# Patient Record
Sex: FEMALE | Race: WHITE | Employment: UNEMPLOYED | URBAN - METROPOLITAN AREA
[De-identification: names, ages, dates, MRNs, and addresses within clinical notes are randomized per-mention and may not be internally consistent; named-entity substitution may affect disease eponyms.]

---

## 2018-02-21 PROBLEM — B00.1 RECURRENT COLD SORES: Status: ACTIVE | Noted: 2018-02-21

## 2019-05-23 PROBLEM — D12.5 ADENOMATOUS POLYP OF SIGMOID COLON: Status: ACTIVE | Noted: 2019-05-23

## 2024-09-18 ENCOUNTER — TELEPHONE (OUTPATIENT)
Age: 59
End: 2024-09-18

## 2024-09-30 ENCOUNTER — OFFICE VISIT (OUTPATIENT)
Dept: SURGERY | Age: 59
End: 2024-09-30
Payer: COMMERCIAL

## 2024-09-30 VITALS
RESPIRATION RATE: 12 BRPM | DIASTOLIC BLOOD PRESSURE: 62 MMHG | HEIGHT: 66 IN | TEMPERATURE: 97.8 F | WEIGHT: 139.8 LBS | HEART RATE: 72 BPM | BODY MASS INDEX: 22.47 KG/M2 | OXYGEN SATURATION: 97 % | SYSTOLIC BLOOD PRESSURE: 112 MMHG

## 2024-09-30 DIAGNOSIS — R92.8 ABNORMAL MAMMOGRAM OF LEFT BREAST: Primary | ICD-10-CM

## 2024-09-30 PROBLEM — R39.11 URINARY HESITANCY: Status: ACTIVE | Noted: 2023-10-24

## 2024-09-30 PROBLEM — F32.5 MAJOR DEPRESSION IN REMISSION (HCC): Status: ACTIVE | Noted: 2023-10-24

## 2024-09-30 PROBLEM — N39.3 FEMALE STRESS INCONTINENCE: Status: ACTIVE | Noted: 2023-10-24

## 2024-09-30 PROBLEM — E78.5 HYPERLIPIDEMIA, MILD: Status: ACTIVE | Noted: 2023-10-24

## 2024-09-30 PROBLEM — H93.13 TINNITUS OF BOTH EARS: Status: ACTIVE | Noted: 2023-10-24

## 2024-09-30 PROBLEM — H91.93 BILATERAL CHANGE IN HEARING: Status: ACTIVE | Noted: 2023-10-24

## 2024-09-30 PROCEDURE — 99203 OFFICE O/P NEW LOW 30 MIN: CPT | Performed by: SURGERY

## 2024-09-30 RX ORDER — FAMOTIDINE 20 MG/1
20 TABLET, FILM COATED ORAL DAILY
COMMUNITY

## 2024-09-30 ASSESSMENT — ENCOUNTER SYMPTOMS
CHEST TIGHTNESS: 0
ABDOMINAL PAIN: 0
NAUSEA: 0
COUGH: 0
VOMITING: 0
COLOR CHANGE: 0
SORE THROAT: 0
SHORTNESS OF BREATH: 0

## 2024-09-30 NOTE — PROGRESS NOTES
questionnaire     Fear of Current or Ex-Partner: Patient declined     Emotionally Abused: Patient declined     Physically Abused: Patient declined     Sexually Abused: Patient declined   Housing Stability: Low Risk  (11/12/2021)    Housing Stability Vital Sign     Unable to Pay for Housing in the Last Year: No     Number of Places Lived in the Last Year: 1     Unstable Housing in the Last Year: No     Under care of PCP for all positive symptoms  Review of Systems   Constitutional:  Negative for activity change, appetite change, chills, diaphoresis, fatigue, fever and unexpected weight change.   HENT:  Negative for congestion, ear pain, hearing loss, mouth sores, nosebleeds and sore throat.    Respiratory:  Negative for cough, chest tightness and shortness of breath.    Cardiovascular:  Negative for chest pain, palpitations and leg swelling.   Gastrointestinal:  Negative for abdominal pain, nausea and vomiting.   Endocrine: Negative for cold intolerance, heat intolerance, polydipsia, polyphagia and polyuria.   Genitourinary:  Negative for difficulty urinating, menstrual problem and vaginal bleeding.   Musculoskeletal:  Positive for neck pain. Negative for neck stiffness.   Skin:  Negative for color change, pallor, rash and wound.   Allergic/Immunologic: Negative for environmental allergies and immunocompromised state.   Neurological:  Negative for weakness.   Hematological:  Does not bruise/bleed easily.   Psychiatric/Behavioral:  Negative for agitation, confusion, sleep disturbance and suicidal ideas. The patient is not nervous/anxious.        Have you ever tested positive for AIDS? no  Have you ever tested positive for Hepatitis? no    ANTICOAGULANT MEDICATIONS:  none    SOCIAL HISTORY   Marital status:   Occupation:  retired  Tobacco use: Mendy  reports that she has never smoked. She has never used smokeless tobacco.  Alcohol use: Mendy  reports current alcohol use.  Drug use: Mendy  reports no history of drug

## 2024-09-30 NOTE — PATIENT INSTRUCTIONS
test results aren't clear, you may have another biopsy or test.  How can you care for yourself at home?  You can go back to most of your usual activities right away. But for the first 24 hours, avoid activities that put pressure on your chest or require you to move your upper body a lot.  The site may be tender for 2 or 3 days. You may also have some bruising, swelling, or slight bleeding.  You may find it more comfortable to wear a supportive bra or chest binder for the first few days.  It may be more comfortable to sleep on your back or on the side that wasn't biopsied.  You can use an ice pack. Put ice or a cold pack on the area for 10 to 20 minutes at a time. Put a thin cloth between the ice and your skin.  Ask your doctor if you can take an over-the-counter pain medicine, such as acetaminophen, ibuprofen, or naproxen. Be safe with medicines. Read and follow all instructions on the label.  Follow-up care is a key part of your treatment and safety. Be sure to make and go to all appointments, and call your doctor if you are having problems. It's also a good idea to keep a list of the medicines you take. Ask your doctor when you can expect to have your test results.  Where can you learn more?  Go to https://www.[employer].net/patientEd and enter Y286 to learn more about \"Core Needle Breast Biopsy: About This Test.\"  Current as of: October 25, 2023  Content Version: 14.1  © 9996-3128 [employer].   Care instructions adapted under license by [employer]. If you have questions about a medical condition or this instruction, always ask your healthcare professional. [employer] disclaims any warranty or liability for your use of this information.

## 2024-10-04 ENCOUNTER — TELEPHONE (OUTPATIENT)
Dept: SURGERY | Age: 59
End: 2024-10-04

## 2024-10-04 NOTE — TELEPHONE ENCOUNTER
----- Message from Dr. Melody Norton MD sent at 10/4/2024  8:14 AM EDT -----  Call her - reeviewed imaging and agreed with bx recs    Also I want to see her 10 days after her biopsy - can u make her an appt  
I informed the patient that Dr. Norton reviewed her breast imaging and is recommending she keep the left breast biopsy as scheduled on 10/9/2024.The patient is scheduled for a post biopsy appointment on 10/16/2024. The patient verbalized understanding to have the biopsy completed as scheduled and to follow up in the office.The patient was provided with the appointment date/time/location and has no questions at this time.The patient is aware to call the office with any questions or concerns.  
Alert and oriented to person, place and time

## 2024-10-09 ENCOUNTER — HOSPITAL ENCOUNTER (OUTPATIENT)
Dept: WOMENS IMAGING | Age: 59
Discharge: HOME | End: 2024-10-11
Attending: SURGERY
Payer: COMMERCIAL

## 2024-10-09 VITALS — RESPIRATION RATE: 20 BRPM | DIASTOLIC BLOOD PRESSURE: 77 MMHG | HEART RATE: 67 BPM | SYSTOLIC BLOOD PRESSURE: 124 MMHG

## 2024-10-09 DIAGNOSIS — R92.8 ABNORMAL MAMMOGRAM OF LEFT BREAST: ICD-10-CM

## 2024-10-09 PROCEDURE — A4217 STERILE WATER/SALINE, 500 ML: HCPCS | Performed by: RADIOLOGY

## 2024-10-09 PROCEDURE — 77065 DX MAMMO INCL CAD UNI: CPT

## 2024-10-09 PROCEDURE — 2500000003 HC RX 250 WO HCPCS: Performed by: RADIOLOGY

## 2024-10-09 PROCEDURE — 76098 X-RAY EXAM SURGICAL SPECIMEN: CPT

## 2024-10-09 PROCEDURE — A4648 IMPLANTABLE TISSUE MARKER: HCPCS

## 2024-10-09 PROCEDURE — 2580000003 HC RX 258: Performed by: RADIOLOGY

## 2024-10-09 RX ORDER — LIDOCAINE HYDROCHLORIDE AND EPINEPHRINE 10; 10 MG/ML; UG/ML
20 INJECTION, SOLUTION INFILTRATION; PERINEURAL ONCE
Status: COMPLETED | OUTPATIENT
Start: 2024-10-09 | End: 2024-10-09

## 2024-10-09 RX ORDER — SODIUM CHLORIDE 9 MG/ML
250 INJECTION, SOLUTION INTRAVENOUS CONTINUOUS
Status: DISCONTINUED | OUTPATIENT
Start: 2024-10-09 | End: 2024-10-12 | Stop reason: HOSPADM

## 2024-10-09 RX ORDER — MAGNESIUM HYDROXIDE 1200 MG/15ML
LIQUID ORAL CONTINUOUS PRN
Status: COMPLETED | OUTPATIENT
Start: 2024-10-09 | End: 2024-10-09

## 2024-10-09 RX ORDER — LIDOCAINE HYDROCHLORIDE 20 MG/ML
20 INJECTION, SOLUTION INFILTRATION; PERINEURAL ONCE
Status: COMPLETED | OUTPATIENT
Start: 2024-10-09 | End: 2024-10-09

## 2024-10-09 RX ADMIN — SODIUM CHLORIDE 250 ML: 900 IRRIGANT IRRIGATION at 14:20

## 2024-10-09 RX ADMIN — LIDOCAINE HYDROCHLORIDE,EPINEPHRINE BITARTRATE 4 ML: 10; .01 INJECTION, SOLUTION INFILTRATION; PERINEURAL at 14:26

## 2024-10-09 RX ADMIN — LIDOCAINE HYDROCHLORIDE 4 ML: 20 INJECTION, SOLUTION INFILTRATION; PERINEURAL at 14:25

## 2024-10-09 ASSESSMENT — PAIN SCALES - GENERAL
PAINLEVEL_OUTOF10: 0
PAINLEVEL_OUTOF10: 0

## 2024-10-09 NOTE — DISCHARGE INSTRUCTIONS
Post Procedure Instructions for Breast Biopsies    You have had a breast biopsy of the Left Breast at [facility], which was ordered by Dr. RACHEL Norton    Activity  You may return to work or other activities the day of your biopsy, providing these activites do not require any heavy lifting or strenuous activity. We do recommend that you take the rest of the day following your biopsy just to rest.    Diet  You may resume your normal diet    Medications  1. Continue taking your normal medications.  2. You may take a mild pain reliever, as needed, such as Tylenol (Acetaminophen), Advil (Ibuprofen) or Motrin    Dressing  1. Wear your bra day and night for the remainder of today and tomorrow  2. Keep the ice pack on for 15 minutes at least 2 times today  3. You may shower and pat the dressing dry tomorrow.   On Friday you may remove the dressing. The biopsy site should have started to heal at this point. At your discretion you can put a band-aid on it.    Other Instructions  1. You may have some bruising following the biopsy, that is normal because of the compression and it will heal and go away  2. For severyal days or even a couple of weeks, you may feel mild tenderness, \"twinges\", or even a small bump at the biopsy site. This is normal. Applying moist heat to the area may bring relief. This will disappear with time    If you develop any of the following symptoms, please contact your referring physician.  1. Active bleeding-hold compression to the site. If it does not stop call the radiology department  2. If you develop redness or heat at the biopsy site or a fever 5-7 days following your biopsy this could be a sign of an early infection    Physician performing exam: Dr. RACHEL Rayo    Follow up appointment with Dr. Melody Norton on 10/16/24 at 1345 in [place].  [phone]    Your [facility] Team  Pippa Christine RN, [facility]   Phone: [phone]   Cell

## 2024-10-14 ENCOUNTER — TELEPHONE (OUTPATIENT)
Dept: SURGERY | Age: 59
End: 2024-10-14

## 2024-10-14 DIAGNOSIS — D05.12 BREAST NEOPLASM, TIS (DCIS), LEFT: Primary | ICD-10-CM

## 2024-10-14 NOTE — TELEPHONE ENCOUNTER
Patient called for results of biopsy. Informed patient the report was completed, but Dr. Norton had not commented yet. Reminded her of her appointment on 10/16/24 to review the results. Patient is asking to have an idea of what will be discussed on Wednesday. Her  is leaving for out of town today and would like to be with patient if patient is going to receiving unfavorable news.

## 2024-10-14 NOTE — TELEPHONE ENCOUNTER
PATIENT:  Mendy Gonzalez    DATE:     10/14/2024    TELEPHONE CONVERSATION:    Mendy MATHEW Carlos was called regarding biopsy results.    Dictated by:  Melody Norton MD  10/14/2024

## 2024-10-16 ENCOUNTER — PREP FOR PROCEDURE (OUTPATIENT)
Dept: SURGERY | Age: 59
End: 2024-10-16

## 2024-10-16 ENCOUNTER — OFFICE VISIT (OUTPATIENT)
Dept: SURGERY | Age: 59
End: 2024-10-16
Payer: COMMERCIAL

## 2024-10-16 VITALS
BODY MASS INDEX: 22.82 KG/M2 | DIASTOLIC BLOOD PRESSURE: 80 MMHG | SYSTOLIC BLOOD PRESSURE: 110 MMHG | HEART RATE: 84 BPM | HEIGHT: 66 IN | RESPIRATION RATE: 12 BRPM | WEIGHT: 142 LBS | TEMPERATURE: 97.4 F | OXYGEN SATURATION: 96 %

## 2024-10-16 DIAGNOSIS — N64.9 BREAST DISORDER: ICD-10-CM

## 2024-10-16 DIAGNOSIS — D05.12 BREAST NEOPLASM, TIS (DCIS), LEFT: Primary | ICD-10-CM

## 2024-10-16 DIAGNOSIS — D05.12 INTRADUCTAL CARCINOMA IN SITU OF LEFT BREAST: ICD-10-CM

## 2024-10-16 PROCEDURE — 99215 OFFICE O/P EST HI 40 MIN: CPT | Performed by: SURGERY

## 2024-10-16 NOTE — PROGRESS NOTES
CANCER TALK    PATIENT:  Mendy Gonzalez    DATE:     10/16/24    SURGICAL DISCUSSION:    Vitals:    10/16/24 1337   BP: 110/80   Pulse: 84   Resp: 12   Temp: 97.4 °F (36.3 °C)   SpO2: 96%   Weight: 64.4 kg (142 lb)   Height: 1.68 m (5' 6.14\")        Cancer Staging   Breast neoplasm, Tis (DCIS), left  Staging form: Breast, AJCC 8th Edition  - Clinical: Stage 0 (cTis (DCIS), cN0, cM0, G3, ER+, AL+) - Signed by Melody Norton MD on 10/14/2024       Mendy is a 59 y.o. year old  female who presents for a discussion LEFT DCIS.    We underwent a description of the pathology of her tumor, the clinical stage, her treatment options of breast conservation versus simple mastectomy and sentinel lymph node biopsy.  These were all discussed with her.   The patient is aware that by having a sentinel lymph node if the sentinel lymph node is negative on frozen section and the final pathology is positive she will need to return to the Operating Room.  She is also aware there is a 5% chance that the sentinel lymph node may not be identified at surgery and that she may need to have a completion axillary dissection. There is a 3-5% chance of a false/negative finding on sentinel lymph node biopsy.  The indications for chemotherapy and radiation therapy were also discussed. The patient has undergone explanation of the complications of the procedures which are including but not limited to bleeding, infection, nerve injury and lymphedema.   The variation in lymphedema rates between sentinel lymph node biopsy and the completion axillary dissection were discussed.  The patient is aware that if she meets all the criteria of having clear margins that the survival and local recurrence rate for mastectomy and breast conservation are the same.  The potential risk of local recurrence is 5-6%.  She is aware that if her margins return positive on the lumpectomy specimen that she would need to have either a re-excision for margins or a completion

## 2024-10-17 ENCOUNTER — HOSPITAL ENCOUNTER (OUTPATIENT)
Dept: MRI IMAGING | Age: 59
Discharge: HOME | End: 2024-10-19
Attending: SURGERY
Payer: COMMERCIAL

## 2024-10-17 ENCOUNTER — TELEPHONE (OUTPATIENT)
Age: 59
End: 2024-10-17

## 2024-10-17 DIAGNOSIS — N64.9 BREAST DISORDER: ICD-10-CM

## 2024-10-17 DIAGNOSIS — D05.12 BREAST NEOPLASM, TIS (DCIS), LEFT: ICD-10-CM

## 2024-10-17 PROCEDURE — 6360000004 HC RX CONTRAST MEDICATION: Performed by: SURGERY

## 2024-10-17 PROCEDURE — A9577 INJ MULTIHANCE: HCPCS | Performed by: SURGERY

## 2024-10-17 PROCEDURE — C8908 MRI W/O FOL W/CONT, BREAST,: HCPCS

## 2024-10-17 RX ADMIN — GADOBENATE DIMEGLUMINE 20 ML: 529 INJECTION, SOLUTION INTRAVENOUS at 18:52

## 2024-10-17 NOTE — TELEPHONE ENCOUNTER
[facility] called to give approved prior authorization for a patients MRI   MRI both breasts   Approval dates from 10/17- 11/15  Auth # [id number]

## 2024-10-22 ENCOUNTER — TELEPHONE (OUTPATIENT)
Dept: SURGERY | Age: 59
End: 2024-10-22

## 2024-10-22 DIAGNOSIS — D05.12 INTRADUCTAL CARCINOMA IN SITU OF LEFT BREAST: ICD-10-CM

## 2024-10-22 DIAGNOSIS — R92.8 ABNORMAL MAGNETIC RESONANCE IMAGING OF BREAST, BILATERAL: Primary | ICD-10-CM

## 2024-10-22 NOTE — TELEPHONE ENCOUNTER
I informed the patient that based on her breast MRI results, there is a recommendation of second look imaging of bilateral breasts. I explained that the breast navigator would be reaching out to her to coordinate the diagnostic imaging. I explained that her appointments today for pre-operative teaching,  ant OT evaluation would be cancelled and rescheduled after further workup is completed. The patient verbalized understanding and has no questions at this time. The patient is aware to call the office with any questions/concerns.

## 2024-10-23 LAB
Lab: NEGATIVE
Lab: NORMAL

## 2024-10-24 ENCOUNTER — PATIENT MESSAGE (OUTPATIENT)
Dept: SURGERY | Age: 59
End: 2024-10-24

## 2024-10-24 ENCOUNTER — CLINICAL DOCUMENTATION (OUTPATIENT)
Dept: SURGERY | Age: 59
End: 2024-10-24

## 2024-10-24 ENCOUNTER — TELEPHONE (OUTPATIENT)
Age: 59
End: 2024-10-24

## 2024-10-24 NOTE — TELEPHONE ENCOUNTER
Patient is reaching out with concern about her Covid vaccine that she received on 10/16/24.  She was not informed that she could not have a mammogram for 6 weeks after.  She is also concerned with the other procedures and tests that she has coming up.    Please call patient to discuss her options.  [phone]

## 2024-10-24 NOTE — PROGRESS NOTES
Per tumor board discussion on 10/22/2024 with the radiologist the second look imaging recommendations were as follows:    Left Breast MRI biopsy  Right Breast Ultrasound  Right Breast Diagnostic mammogram if Needed    This is to supercede the original recommendations on the Breast Biopsy Results

## 2024-10-24 NOTE — TELEPHONE ENCOUNTER
After verifying the next course of action with Dr. Norton, I contacted the patient to reassure her that would not have to wait 6 weeks for her second look imaging. I her case of diagnosed cancer this parameter would not be followed. I let the patient know that I would contact the department and they would reach out to reschedule.

## 2024-10-25 ENCOUNTER — TELEPHONE (OUTPATIENT)
Dept: WOMENS IMAGING | Age: 59
End: 2024-10-25

## 2024-10-25 NOTE — TELEPHONE ENCOUNTER
10/25/24  Called patient and time spent reviewing her appointments with her.  We have her now scheduled for her ultrasound of the right on Monday, 10/28/24 at 1100 here in [place].  If she need right diagnostic mammogram it will be done also.  Encouraged patient to call with any questions or concerns and phone numbers given.  Will check if she is to keep her appt with radiation/oncology that is scheduled for 10/28/24 at 1430 and let her know.

## 2024-10-28 ENCOUNTER — HOSPITAL ENCOUNTER (OUTPATIENT)
Dept: RADIATION ONCOLOGY | Age: 59
Discharge: HOME | End: 2024-10-28
Payer: COMMERCIAL

## 2024-10-28 ENCOUNTER — HOSPITAL ENCOUNTER (OUTPATIENT)
Dept: ULTRASOUND IMAGING | Age: 59
Discharge: HOME | End: 2024-10-30
Attending: SURGERY
Payer: COMMERCIAL

## 2024-10-28 VITALS
SYSTOLIC BLOOD PRESSURE: 120 MMHG | BODY MASS INDEX: 22.76 KG/M2 | HEIGHT: 66 IN | RESPIRATION RATE: 18 BRPM | HEART RATE: 72 BPM | OXYGEN SATURATION: 98 % | WEIGHT: 141.6 LBS | TEMPERATURE: 97.8 F | DIASTOLIC BLOOD PRESSURE: 65 MMHG

## 2024-10-28 DIAGNOSIS — D05.12 BREAST NEOPLASM, TIS (DCIS), LEFT: Primary | ICD-10-CM

## 2024-10-28 DIAGNOSIS — R92.8 ABNORMAL MAMMOGRAM: Primary | ICD-10-CM

## 2024-10-28 DIAGNOSIS — R92.8 ABNORMAL MAGNETIC RESONANCE IMAGING OF BREAST, BILATERAL: ICD-10-CM

## 2024-10-28 PROCEDURE — 99497 ADVNCD CARE PLAN 30 MIN: CPT | Performed by: RADIOLOGY

## 2024-10-28 PROCEDURE — 99205 OFFICE O/P NEW HI 60 MIN: CPT | Performed by: RADIOLOGY

## 2024-10-28 PROCEDURE — 76641 ULTRASOUND BREAST COMPLETE: CPT

## 2024-10-28 PROCEDURE — 99212 OFFICE O/P EST SF 10 MIN: CPT | Performed by: RADIOLOGY

## 2024-10-28 NOTE — PROGRESS NOTES
SW met with pt as she was seen for consultation in radiation oncology for treatment of breast cancer. Pt presents as pleasant and engaging, and she endorsed a distress of 5/10 today. She notes that there have been a few changes in her treatment schedule, but in speaking of these changes (I.e., surgery being delayed based on tumor board recommendations for further tests), she remains optimistic, \"I know that what they found already is early, and I am ok about the other tests because, if there is something, they are catching that even earlier. I'm ok with that.\"     Pt states she lives at home with her . 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 She states he is able to transport her to necessary appointments, but she also adds that she prefers to come alone when able so as to not disrupt his work schedule.  Pt also reports she has three adult children, who live out of the home, but nearby enough to provide support as needed. She adds that she has a sister who lives in [place], and she describes other relationships with family and friends.     Supportive services at the cancer center were discussed. Pt was provided with SW business card and invited to contact SW should she have further question or concern, to which she was agreeable. SW will continue to follow should pt have radiation therapy.

## 2024-10-28 NOTE — PROGRESS NOTES
NURSING ASSESSMENT     Date: 10/28/2024        Patient Name: Mendy Gonzalez     YOB: 1965      Age:  59 y.o.      MRN: [id number]       Chaperone [] Yes   [x] No      Advance Directives:   Do you currently have completed advance directives (living will)? [] Yes   [x] No         *If yes, please bring us a copy for your records.  *If no, would you like info or assistance in completing advance directives (living will)?   [] Yes   [x] No    Pain Score:   Pain Score (1-10): none  Pain Location: n/a  Pain Duration: n/a  Pain Management/Control: n/a      Is pain affecting your ability to take care of yourself or move throughout your home?                        [] Yes   [x] No    General: No Problems  Patient has gained weight [] Yes   [x] No  Patient has lost weight [] Yes   [x] No  How much weight in pounds and over what length of time:     Eyes (Ophthalmic): glasses     Skin (Dermatological): No Problems     ENT: No Problems     Respiratory: No Problems     Cardiovascular: No Problems      Device   [] Yes   [x] No   Copy of Card Obtained [] Yes   [x] No    Gastrointestinal: No Problems    Genito-Urinary: No Problems     Breast: pt has known DCIS left breast, had mammo and US today for abnormal MRI findings, will have Bx right breast on 10/30/24     Musculoskeletal: Joint Pain right elbow pain x2 month    Neurological: No Problems      Hematological and Lymphatic: No Problems     Endocrine: No Problems     Gyn History:   /Para: 3/3  Age of Menarche: 15  Age of Menopause n/a  Vaginal Bleeding:    First Pregnancy: 28  Breast Feeding:  yes  Last Menstrual period: 9/10/2024  Oral Contraceptives: yes     Number of years: 7 years  Hormone Replacement therapy no     Number of Years:   Last Pap Smear: aug 2024  Last Mammogram 26    A 10-point review of systems  has been conducted and pertinent positives have been   recorded. All other review of systems are negative    Was the patient admitted

## 2024-10-29 ENCOUNTER — TELEPHONE (OUTPATIENT)
Dept: SURGERY | Age: 59
End: 2024-10-29

## 2024-10-29 NOTE — TELEPHONE ENCOUNTER
Patient was informed that her genetic test results are negative for a deleterious mutation. A negative test result does not mean her risk for breast cancer is not elevated. Her risk may still be elevated and continue the current treatment / follow up plan as previously recommended by Dr. Norton. She will receive a copy of the test results in the mail. In the information packet, there is information to contact a genetic counselor. This is free of charge and Dr. Norton highly recommends all her patients to contact them. Please call the office at [phone] with any questions.

## 2024-10-29 NOTE — TELEPHONE ENCOUNTER
----- Message from Dr. Melody Norton MD sent at 10/25/2024  9:16 AM EDT -----  Regarding: Call with neg genetics  ----- Message -----  From: [facility]  Sent: 10/23/2024   9:03 PM EDT  To: Melody Norton MD

## 2024-10-30 ENCOUNTER — TELEPHONE (OUTPATIENT)
Dept: SURGERY | Age: 59
End: 2024-10-30

## 2024-10-30 ENCOUNTER — HOSPITAL ENCOUNTER (OUTPATIENT)
Dept: WOMENS IMAGING | Age: 59
Discharge: HOME | End: 2024-11-01
Attending: SURGERY
Payer: COMMERCIAL

## 2024-10-30 ENCOUNTER — TELEPHONE (OUTPATIENT)
Dept: WOMENS IMAGING | Age: 59
End: 2024-10-30

## 2024-10-30 ENCOUNTER — HOSPITAL ENCOUNTER (OUTPATIENT)
Dept: ULTRASOUND IMAGING | Age: 59
Discharge: HOME | End: 2024-11-01
Attending: SURGERY
Payer: COMMERCIAL

## 2024-10-30 VITALS — HEART RATE: 82 BPM | SYSTOLIC BLOOD PRESSURE: 118 MMHG | DIASTOLIC BLOOD PRESSURE: 69 MMHG | RESPIRATION RATE: 20 BRPM

## 2024-10-30 DIAGNOSIS — D05.12 INTRADUCTAL CARCINOMA IN SITU OF LEFT BREAST: Primary | ICD-10-CM

## 2024-10-30 DIAGNOSIS — R92.8 ABNORMAL MAGNETIC RESONANCE IMAGING OF BREAST, BILATERAL: ICD-10-CM

## 2024-10-30 DIAGNOSIS — R92.8 ABNORMAL MAMMOGRAM: ICD-10-CM

## 2024-10-30 DIAGNOSIS — R92.8 ABNORMAL MRI, BREAST: ICD-10-CM

## 2024-10-30 PROCEDURE — 2500000003 HC RX 250 WO HCPCS: Performed by: RADIOLOGY

## 2024-10-30 PROCEDURE — 19083 BX BREAST 1ST LESION US IMAG: CPT

## 2024-10-30 PROCEDURE — 77065 DX MAMMO INCL CAD UNI: CPT

## 2024-10-30 RX ORDER — LIDOCAINE HYDROCHLORIDE 20 MG/ML
20 INJECTION, SOLUTION INFILTRATION; PERINEURAL ONCE
Status: COMPLETED | OUTPATIENT
Start: 2024-10-30 | End: 2024-10-30

## 2024-10-30 RX ORDER — LIDOCAINE HYDROCHLORIDE AND EPINEPHRINE 10; 10 MG/ML; UG/ML
20 INJECTION, SOLUTION INFILTRATION; PERINEURAL ONCE
Status: COMPLETED | OUTPATIENT
Start: 2024-10-30 | End: 2024-10-30

## 2024-10-30 RX ADMIN — LIDOCAINE HYDROCHLORIDE 1 ML: 20 INJECTION, SOLUTION INFILTRATION; PERINEURAL at 13:48

## 2024-10-30 RX ADMIN — LIDOCAINE HYDROCHLORIDE,EPINEPHRINE BITARTRATE 0.5 ML: 10; .01 INJECTION, SOLUTION INFILTRATION; PERINEURAL at 13:49

## 2024-10-30 ASSESSMENT — PAIN SCALES - GENERAL
PAINLEVEL_OUTOF10: 0
PAINLEVEL_OUTOF10: 0

## 2024-10-30 NOTE — SEDATION DOCUMENTATION
US Guided Core Biopsy of Right Breast      1305 Patient ambulated, gait steady, into [facility] pre procedure room. Reviewed procedure with patient and patient verbalizes understanding.  Consent obtained. Allergies, history, and medications reviewed. Vitals signs taken, VSS.     1314 Assisted patient into ultrasound room and onto ultrasound cart.    Tech scanning and Dr. Rayo  looking at scans and talking with patient.  Area of concern marked.     1340 Timeout Completed.       1342 Site cleansed with chloraprep x 3. After 3 minute dry time, draped with sterile towels around area, and sterile probe cover applied over probe.      1348 Using ultrasound guidance, Lidocaine 2% 1 ml injected into site to numb area, see eMar.     1349 Using 22G spinal needle, Dr. Rayo  injected lidocaine 1% with epi 0. 5ml deeper into biopsy site, see eMar.     Small incision made using scalpel.    Using Quench core biopsy instrument kit 14g x 10cm lot [id number] exp 02/28/2027 sample of tissue taken. Patient tolerated well. Tissue collected at 1356  and into formalin marked right breast    Total 5  samples obtained.     Dr. Rayo then inserted  marker SENOMARK ULTRACOR enhanced heart lot [id number] exp 05/01/2027  Compression held to site for 5 minutes and then slowly released. Site soft, no bleeding.     Dressing of steri strips, gauze and large tegaderm applied.      Post biopsy mammogram ordered per verbal order from Dr. Rayo    Specimen order placed per [facility] protocol.     1412 Vitals taken, VSS. Patient assisted off ultrasound cart and into consultation room. Steady gait. Post bx mamm completed.    1435 Discharge instructions reviewed with patient and patient verbalizes understanding. Biopsy site soft. Dressing clean, dry, and intact. Patient prefers to wear snug fitting bra.  Ice packs given for home. Denies any pain. Patient is now scheduled for MRI left breast biopsy on Nov 7 in [place].  Patient left [facility] with steady gait. Encouraged to

## 2024-10-30 NOTE — TELEPHONE ENCOUNTER
10/30/24  Patient is now scheduled for MRI left breast biopsy for Nov 7th to arrive at 1230 for 1300 biopsy at [facility]. Procedure and directions were reviewed with patient be Radha, navigator at [facility]. Dr. Norton made aware.

## 2024-10-30 NOTE — TELEPHONE ENCOUNTER
PATIENT:  Mendy Gonzalez    DATE:     10/30/2024    TELEPHONE CONVERSATION:    Mendy Gonzalez was called regarding mammogram results and ultrasound results.  She is having us biopsy today    Dictated by:  Melody Norton MD  10/30/2024

## 2024-10-30 NOTE — DISCHARGE INSTRUCTIONS
Post Procedure Instructions for Breast Biopsies    You have had a breast biopsy of the Right  Breast at [facility], which was ordered by Dr. RACHEL Norton     Activity  You may return to work or other activities the day of your biopsy, providing these activites do not require any heavy lifting or strenuous activity. We do recommend that you take the rest of the day following your biopsy just to rest.    Diet  You may resume your normal diet    Medications  1. Continue taking your normal medications, except products containing aspirin for 48 hours after your biopsy  2. You may take a mild pain reliever, as needed, such as Tylenol (Acetaminophen), Advil (Ibuprofen) or Motrin    Dressing  1. Wear your bra day and night for the remainder of today and tomorrow  2. Keep the ice pack on for 15 minutes at least 2 times today  3. You may shower and pat the dressing dry tomorrow.   On Friday you may remove the dressing. The biopsy site should have started to heal at this point. At your discretion you can put a band-aid on it.    Other Instructions  1. You may have some bruising following the biopsy, that is normal because of the compression and it will heal and go away  2. For severyal days or even a couple of weeks, you may feel mild tenderness, \"twinges\", or even a small bump at the biopsy site. This is normal. Applying moist heat to the area may bring relief. This will disappear with time    If you develop any of the following symptoms, please contact your referring physician.  1. Active bleeding-hold compression to the site. If it does not stop call the radiology department  2. If you develop redness or heat at the biopsy site or a fever 5-7 days following your biopsy this could be a sign of an early infection    Physician performing exam: Dr. Perri Norton's office will call you for follow up appointment   [phone]    Your [facility] Team  Pippa Christine RN,

## 2024-11-04 ENCOUNTER — TELEPHONE (OUTPATIENT)
Dept: SURGERY | Age: 59
End: 2024-11-04

## 2024-11-05 ENCOUNTER — TELEPHONE (OUTPATIENT)
Dept: SURGERY | Age: 59
End: 2024-11-05

## 2024-11-05 NOTE — TELEPHONE ENCOUNTER
JANY    Received a call from [facility],spoke with Rose who had called to ask if provider had received the result for US Breast Complete Right,made caller aware results are in the patient file and that a message will be sent to the medical staff regarding her her call.    Rose contact number [phone]

## 2024-11-06 ENCOUNTER — TELEPHONE (OUTPATIENT)
Dept: SURGERY | Age: 59
End: 2024-11-06

## 2024-11-06 DIAGNOSIS — N60.91 ATYPICAL DUCTAL HYPERPLASIA OF RIGHT BREAST: ICD-10-CM

## 2024-11-06 DIAGNOSIS — D05.12 INTRADUCTAL CARCINOMA IN SITU OF LEFT BREAST: Primary | ICD-10-CM

## 2024-11-06 NOTE — TELEPHONE ENCOUNTER
PATIENT:  Mendy Gonzalez    DATE:     11/6/2024    TELEPHONE CONVERSATION:    Mendy Gonzalez was called regarding pathology results. Awaiting MRI biopsy results    Dictated by:  Melody Norton MD  11/6/2024

## 2024-11-07 ENCOUNTER — HOSPITAL ENCOUNTER (OUTPATIENT)
Dept: MAMMOGRAPHY | Age: 59
Discharge: HOME | End: 2024-11-09
Payer: COMMERCIAL

## 2024-11-07 ENCOUNTER — HOSPITAL ENCOUNTER (OUTPATIENT)
Dept: MRI IMAGING | Age: 59
Discharge: HOME | End: 2024-11-09
Payer: COMMERCIAL

## 2024-11-07 DIAGNOSIS — Z98.890 STATUS POST BREAST BIOPSY: ICD-10-CM

## 2024-11-07 DIAGNOSIS — D05.12 INTRADUCTAL CARCINOMA IN SITU OF LEFT BREAST: ICD-10-CM

## 2024-11-07 DIAGNOSIS — R92.8 ABNORMAL MAGNETIC RESONANCE IMAGING OF BREAST, BILATERAL: ICD-10-CM

## 2024-11-07 PROCEDURE — 6360000004 HC RX CONTRAST MEDICATION: Performed by: SURGERY

## 2024-11-07 PROCEDURE — 77065 DX MAMMO INCL CAD UNI: CPT

## 2024-11-07 PROCEDURE — A9579 GAD-BASE MR CONTRAST NOS,1ML: HCPCS | Performed by: SURGERY

## 2024-11-07 PROCEDURE — 19085 BX BREAST 1ST LESION MR IMAG: CPT

## 2024-11-07 PROCEDURE — 2500000003 HC RX 250 WO HCPCS

## 2024-11-07 PROCEDURE — 2580000003 HC RX 258: Performed by: SURGERY

## 2024-11-07 RX ORDER — SODIUM CHLORIDE 0.9 % (FLUSH) 0.9 %
10 SYRINGE (ML) INJECTION ONCE
Status: COMPLETED | OUTPATIENT
Start: 2024-11-07 | End: 2024-11-07

## 2024-11-07 RX ORDER — 0.9 % SODIUM CHLORIDE 0.9 %
100 INTRAVENOUS SOLUTION INTRAVENOUS ONCE
Status: COMPLETED | OUTPATIENT
Start: 2024-11-07 | End: 2024-11-07

## 2024-11-07 RX ADMIN — SODIUM CHLORIDE 100 ML: 9 INJECTION, SOLUTION INTRAVENOUS at 13:49

## 2024-11-07 RX ADMIN — SODIUM CHLORIDE, PRESERVATIVE FREE 10 ML: 5 INJECTION INTRAVENOUS at 13:49

## 2024-11-07 RX ADMIN — GADOTERIDOL 13 ML: 279.3 INJECTION, SOLUTION INTRAVENOUS at 13:44

## 2024-11-08 ENCOUNTER — HOSPITAL ENCOUNTER (OUTPATIENT)
Dept: MRI IMAGING | Age: 59
Discharge: HOME | End: 2024-11-10
Payer: COMMERCIAL

## 2024-11-08 PROCEDURE — 19086 BX BREAST ADD LESION MR IMAG: CPT

## 2024-11-12 LAB — SURGICAL PATHOLOGY REPORT: NORMAL

## 2024-11-13 ENCOUNTER — PATIENT MESSAGE (OUTPATIENT)
Dept: SURGERY | Age: 59
End: 2024-11-13

## 2024-11-13 DIAGNOSIS — D05.12 INTRADUCTAL CARCINOMA IN SITU OF LEFT BREAST: ICD-10-CM

## 2024-11-13 DIAGNOSIS — N60.91 ATYPICAL DUCTAL HYPERPLASIA OF RIGHT BREAST: Primary | ICD-10-CM

## 2024-11-21 ENCOUNTER — TELEPHONE (OUTPATIENT)
Age: 59
End: 2024-11-21

## 2024-11-21 NOTE — TELEPHONE ENCOUNTER
PATIENT CALLED AND WENT TO PLASTIC SURGEON AND SHE WOULD LIKE TO KNOW WHAT THE NEXT STEP WILL BE. PLEASE ADVISE

## 2024-11-22 ENCOUNTER — TELEMEDICINE (OUTPATIENT)
Dept: SURGERY | Age: 59
End: 2024-11-22

## 2024-11-22 DIAGNOSIS — N60.91 ATYPICAL DUCTAL HYPERPLASIA OF RIGHT BREAST: ICD-10-CM

## 2024-11-22 DIAGNOSIS — D05.12 INTRADUCTAL CARCINOMA IN SITU OF LEFT BREAST: Primary | ICD-10-CM

## 2024-11-22 NOTE — TELEPHONE ENCOUNTER
I contacted the patient to schedule a virtual visit with Dr. Norton. The patient has been scheduled for 1:00 pm today with Dr. Norton.

## 2024-11-22 NOTE — TELEPHONE ENCOUNTER
I spoke with the patient regarding her consultation with Dr. Bailey. The patient has lingering questions regarding nipple viability and well as the amount of skin remaining after the mastectomy. I let the patient know that I would contact Dr. Norton to set up a virtual visit to discuss her questions and help her make her final surgical decision.I will contact the patient after I have spoken with Dr. Norton to schedule the call.

## 2024-11-22 NOTE — PROGRESS NOTES
Patient notified that this is a billable service and has given verbal consent for a virtual visit with [facility]    We underwent a description of the pathology of her tumor, the clinical stage, her treatment options of breast conservation versus simple mastectomy and sentinel lymph node biopsy of suspicious RIGHT BREAST CANCER. These were all discussed with her.   The patient is aware that by having a sentinel lymph node if the sentinel lymph node is negative on frozen section and the final pathology is positive she will need to return to the Operating Room.  She is also aware there is a 5% chance that the sentinel lymph node may not be identified at surgery and that she may need to have a completion axillary dissection. There is a 3-5% chance of a false/negative finding on sentinel lymph node biopsy.  The indications for chemotherapy and radiation therapy were also discussed. The patient has undergone explanation of the complications of the procedures which are including but not limited to bleeding, infection, nerve injury and lymphedema.   The variation in lymphedema rates between sentinel lymph node biopsy and the completion axillary dissection were discussed.  The patient is aware that if she meets all the criteria of having clear margins that the survival and local recurrence rate for mastectomy and breast conservation are the same.  The potential risk of local recurrence is 5-6%. Due to BILATERAL DISEASE and difficult mammogram, she is opting for BILATERAL MASTECTOMIES WITH SLN. She has travel plans and is determining whether to delay after surgery. Will refer her to medical oncology for possible neoadjuvant hormonal therapy. Explained standard of care for this is surgery. She stated understanding.       Total face to face time was 25 minutes today with greater than 50% spent on counseling the patient or coordinating her care, reviewing records prior to visit, history, physical exam, reviewing imaging.

## 2024-11-29 NOTE — PROGRESS NOTES
Subjective :  Patient ID: Ashley Jiang is a 59 y.o. female.    History of Present Illness: Recent diagnosis of left breast DCIS proven biopsy, denies any other medical issues. Non smoker.    Review of Systems  ROS: All 10 systems were reviewed and are unremarkable except for those mentioned in HPI.     Objective :  Physical Exam  Vitals and nursing note reviewed. Exam conducted with a chaperone present.   Constitutional:       General: She is not in acute distress.     Appearance: She is not ill-appearing.   Eyes:      Extraocular Movements: Extraocular movements intact.      Conjunctiva/sclera: Conjunctivae normal.      Pupils: Pupils are equal, round, and reactive to light.   Cardiovascular:      Rate and Rhythm: Normal rate and regular rhythm.      Pulses: Normal pulses.   Pulmonary:      Effort: Pulmonary effort is normal.      Breath sounds: Normal breath sounds.   Abdominal:      Palpations: Abdomen is soft. There is no mass.      Tenderness: There is no abdominal tenderness.      Hernia: No hernia is present.   Musculoskeletal:         General: No swelling or tenderness.      Cervical back: Normal range of motion and neck supple.   Skin:     Capillary Refill: Capillary refill takes less than 2 seconds.      Coloration: Skin is not jaundiced.      Findings: No bruising or rash.   Neurological:      General: No focal deficit present.      Mental Status: She is oriented to person, place, and time.   Psychiatric:         Mood and Affect: Mood normal.         Behavior: Behavior normal.         Thought Content: Thought content normal.         Judgment: Judgment normal.   Focused exam on breasts:  Left breast: Well healed biopsy sites (x3) with no discrete mass. Grade III ptosis. Normal breast skin. No nipple inversion. No nipple discharge.  Right breast: Well healed biopsy site (x1) with no discrete mass. Grade III ptosis. Normal breast skin. No nipple inversion. No nipple discharge.  Focused exam on abdomen: Soft  and pliable, no hernia, and no wounds in the TAMARA territory bilaterally, small-sized pannus.   Assessment/Plan :    I had the pleasure of meeting with Ashley and her  today at clinic. Ashley is diagnosed with left breast DCIS with large area of abnormality. Right breast is cancer free based on the site biopsied. Patient is interested in breast reconstruction options particularly using her own tissue.     I reviewed with patient and her partner autologous breast reconstruction with focus on indications, contraindications, alternatives and possible complications. I also reviewed commonly utilized donor sites such as the abdomen, thighs, and gluteal regions. Pros and cons were discussed thoroughly. With patient's permission, a TAMARA flap was marked on the abdomen and it was illustrated to her how the flap can be used to reconstruction the breast. Based on patient's BMI and current abdominal pannus size, the patient will need zone I of each TAMARA plus Zone II of the contralateral TAMARA to fill the left breast after mastectomy. Patient understood that TAMARA flap reconstruction is the gold standard; however, TAMARA variant is sometimes indicated such as the muscle sparing TRAM or even the TRAM flap based on the anatomy of the abdominal perforators.  CTA of abdomen and pelvis will be required and review of the results is to be expected.     We also discussed nipple sparing vs. Excision. Pros and cons were reviewed from reconstruction standpoint. We then discussed incisions for the left mastectomy and figure of 8 vs. IMF vs inverted T were marked and discussed with patient. Figure of 8 is warranted with planned skin sparing mastectomy.     We then discussed perioperative course, including administration of  blood thinners and blood transfusion, and falp moniotring techniques includign Doppler exam and continuous remote monitorign with ViOptix, and then we discussed recovery and rehabilitation.     Plan:  -CTA Abdomen and  Pelvis   -Please schedule a follow up after you have completed the CTA to review results and discuss plan of care

## 2024-12-02 ENCOUNTER — HOSPITAL ENCOUNTER (OUTPATIENT)
Dept: RADIOLOGY | Facility: EXTERNAL LOCATION | Age: 59
Discharge: HOME | End: 2024-12-02
Payer: COMMERCIAL

## 2024-12-03 ENCOUNTER — HOSPITAL ENCOUNTER (OUTPATIENT)
Dept: RADIOLOGY | Facility: EXTERNAL LOCATION | Age: 59
Discharge: HOME | End: 2024-12-03

## 2024-12-04 ENCOUNTER — LAB REQUISITION (OUTPATIENT)
Dept: LAB | Facility: HOSPITAL | Age: 59
End: 2024-12-04
Payer: COMMERCIAL

## 2024-12-04 PROBLEM — D12.5 ADENOMATOUS POLYP OF SIGMOID COLON: Status: ACTIVE | Noted: 2019-05-23

## 2024-12-04 PROBLEM — D05.12 DUCTAL CARCINOMA IN SITU (DCIS) OF LEFT BREAST: Status: ACTIVE | Noted: 2024-10-14

## 2024-12-04 PROBLEM — R39.11 URINARY HESITANCY: Status: RESOLVED | Noted: 2023-10-24 | Resolved: 2024-12-04

## 2024-12-04 PROBLEM — H91.93 BILATERAL CHANGE IN HEARING: Status: RESOLVED | Noted: 2023-10-24 | Resolved: 2024-12-04

## 2024-12-04 PROBLEM — N39.3 FEMALE STRESS INCONTINENCE: Status: ACTIVE | Noted: 2023-10-24

## 2024-12-04 PROBLEM — E78.5 HYPERLIPIDEMIA, MILD: Status: ACTIVE | Noted: 2023-10-24

## 2024-12-04 PROBLEM — F32.5 MAJOR DEPRESSION IN REMISSION (CMS-HCC): Status: ACTIVE | Noted: 2023-10-24

## 2024-12-04 PROBLEM — H93.13 TINNITUS OF BOTH EARS: Status: RESOLVED | Noted: 2023-10-24 | Resolved: 2024-12-04

## 2024-12-04 PROBLEM — B00.1 RECURRENT COLD SORES: Status: RESOLVED | Noted: 2018-02-21 | Resolved: 2024-12-04

## 2024-12-04 PROCEDURE — 88321 CONSLTJ&REPRT SLD PREP ELSWR: CPT | Performed by: SPECIALIST

## 2024-12-04 RX ORDER — MULTIVITAMIN
1 TABLET ORAL
COMMUNITY

## 2024-12-04 RX ORDER — BIOTIN 5 MG
CAPSULE ORAL
COMMUNITY

## 2024-12-05 LAB
LAB AP ASR DISCLAIMER: NORMAL
LAB AP ASR DISCLAIMER: NORMAL
LABORATORY COMMENT REPORT: NORMAL
LABORATORY COMMENT REPORT: NORMAL
PATH REPORT.FINAL DX SPEC: NORMAL
PATH REPORT.FINAL DX SPEC: NORMAL
PATH REPORT.GROSS SPEC: NORMAL
PATH REPORT.GROSS SPEC: NORMAL
PATH REPORT.TOTAL CANCER: NORMAL
PATH REPORT.TOTAL CANCER: NORMAL

## 2024-12-06 ENCOUNTER — TELEPHONE (OUTPATIENT)
Age: 59
End: 2024-12-06

## 2024-12-06 NOTE — TELEPHONE ENCOUNTER
All prior authorizations for the [facility] Genetic Testing are handled via [employer]. Information wll be forwarded to [employer].

## 2024-12-06 NOTE — TELEPHONE ENCOUNTER
[employer] called and results from prior auth in power screening has been denied. All of the requirements have not been met . Peer to peer .

## 2024-12-10 ENCOUNTER — HOSPITAL ENCOUNTER (OUTPATIENT)
Dept: RADIOLOGY | Facility: EXTERNAL LOCATION | Age: 59
Discharge: HOME | End: 2024-12-10

## 2024-12-11 PROBLEM — N60.91 ATYPICAL DUCTAL HYPERPLASIA OF RIGHT BREAST: Status: ACTIVE | Noted: 2024-12-11

## 2024-12-11 ASSESSMENT — ENCOUNTER SYMPTOMS
EYES NEGATIVE: 1
NEUROLOGICAL NEGATIVE: 1
HEMATOLOGIC/LYMPHATIC NEGATIVE: 1
CARDIOVASCULAR NEGATIVE: 1
CONSTITUTIONAL NEGATIVE: 1
GASTROINTESTINAL NEGATIVE: 1
PSYCHIATRIC NEGATIVE: 1
RESPIRATORY NEGATIVE: 1
ENDOCRINE NEGATIVE: 1

## 2024-12-11 NOTE — PROGRESS NOTES
Subjective     Diagnosis date: 10/9/2024 left breast ductal carcinoma in situ, nuclear grade 3, ER % 3+, KY % 3+, cTisN0, stage 0  10/30/2024 right breast papillary lesion with at least ADH     Cancer Staging   Ductal carcinoma in situ (DCIS) of left breast  Staging form: Breast, AJCC 8th Edition  - Clinical stage from 10/9/2024: Stage 0 - Signed by Charlette Barnett MD on 2024  cTis (DCIS)  cN0  cM0  ER Status: Positive  KY Status: Positive  HER2 Status: Not assessed        Ashley Jiang is a 59 y.o. female who was referred by: Dr. Shelly Puckett  for evaluation of a screen detected left breast DCIS and right breast ADH. She presents to the [facility] for treatment recommendations. All of her imaging and biopsies were performed at [facility] and have been reviewed by our breast radiologist and pathologist, who agree with the work up and diagnosis. She has had 3 site biopsy (stereo and MR guided) on the left breast, all with DCIS, and she had US biopsy on the right demonstrating ADH but there are additional areas of abnormality on the right beyond the one site biopsy. She denies skin changes or nipple discharge. She has no family history of breast or ovarian cancers. She has been started on low dose tamoxifen by medical oncology because of the delays in surgical care.    BREAST IMAGIN2024 Bilateral screening mammogram demonstrates heterogeneously dense breast tissue density, BI-RADS Category 0, indeterminate calcifications in the upper-outer quadrant of the left breast  2024 left diagnostic mammogram, indicates BI-RADS Category 4, fine and pleomorphic with branching calcifications measuring 2.0 cm and recommended for biopsy  10/17/2024 bilateral breast MRI In the upper-outer quadrant of the left breast, the patient's biopsy-proven DCIS is identified. This presents as clumped non mass enhancement. The non mass enhancement is greater in span than the calcifications. The outside reports  "described findings more suggestive of multifocal disease however this appears to be essentially confluent non mass enhancement in the upper-outer quadrant. The entire confluence measures up to 5.1cm. Multiple abnormalities were described in the right breast. The most significant abnormality described in the right breast was a mass in the lower outer quadrant.    BREAST PROCEDURE:   10/9/24 left breast calcifications UOQ, stereotactic-guided core biopsy (DCIS)  10/30/24 right breast, 9:00 3 cm from nipple, US-guided core biopsy (Papillary lesion with at least ADH)  24 left breast UOQ medial and posterior/inferior 2 site MRI-guided core biopsies (DCIS)    REPRODUCTIVE HEALTH: menarche at 15, eduardo-menopausal, , age at first birth 28, , OCPs 5-10 years    MEDICAL HISTORY: hyperlipidemia    FAMILY CANCER HISTORY:   Mother breast cancer at 56  Father with testicular cancer    MEDICAL ONCOLOGY: referral post op if indicated    RADIATION ONCOLOGY: referral post op if indicated    GENETICS: performed, negative for pathogenic variant    Cancer-related family history is not on file.    Review of Systems   Constitutional: Negative.    HENT: Negative.     Eyes: Negative.         Dry eyes   Respiratory: Negative.     Cardiovascular: Negative.    Gastrointestinal: Negative.    Endocrine: Negative.    Genitourinary: Negative.    Musculoskeletal:  Positive for arthralgias.   Skin: Negative.    Neurological: Negative.    Hematological: Negative.    Psychiatric/Behavioral: Negative.          Objective     Visit Vitals  /73   Pulse 86   Temp 36.4 °C (97.5 °F) (Temporal)   Resp 17   Ht 1.676 m (5' 6\")   Wt 64.7 kg (142 lb 10.2 oz)   BMI 23.02 kg/m²   Smoking Status Never   BSA 1.74 m²        Breast: Exam performed in sitting and supine positions.   cup size: 36 B-C, grade II ptosis  RIGHT BREAST EXAM:  Breast: dense fibroglandular tissue, healed biopsy site, no discrete mass  Skin Erythema: no  Attachment of " Overlying Skin: no  Peau d' orange: no  Chest Wall Attachment: no  Nipple Inversion: no  Nipple Discharge: no     LEFT BREAST EXAM:  Breast: 3 healed biopsy sites in the UOQ with underlying biopsy site change without discrete mass, no erythema or overlying skin changes, dense fibroglandular tissue  Skin Erythema: no  Attachment of Overlying Skin: no  Peau d' orange: no  Chest Wall Attachment: no  Nipple Inversion: no  Nipple Discharge: no     RIGHT MARY BASIN EXAM:  Axillary: negative  Infraclavicular: negative  Supraclavicular: negative     LEFT MARY BASIN EXAM:  Axillary: negative  Infraclavicular: negative  Supraclavicular: negative     General: Otherwise healthy appearing. Patient is in no acute distress.     HEENT: Conjunctivae are well colored and sclerae nonicteric. Neck is supple, trachea midline. No lymphadenopathy or thyromegaly.     Chest: Respiratory rate and effort normal. No cough.     CV: regular rate and rhythm.     Abdomen: Abdomen is non-distended, non-tender to palpation.     Extremities: Extremities are atraumatic. There is no evidence of lymphedema.    Shoulder abduction test: (raising affected arm(s) from lateral to 180 degrees overhead, one at a time) no limitations     Neurologic: Neurologically intact. Alert and oriented.      Imaging    Images from mammogram, MRI, and ultrasound were reviewed with breast imaging and images were shared with Ms. Jiang today.    Assessment and Plan    The natural history and evolution of DCIS was discussed with Ms. Jiang and her partner Kane. This included the difference between in-situ and invasive carcinoma, and the distinction between local and systemic disease and local and systemic therapy. For local treatment options, I explained the risks and benefits of breast conservation and mastectomy (with or without reconstruction), including the fact that survival rates are equal with these two approaches, which is essentially 100% with non-invasive disease.  If breast conservation is elected, I explained the need for free margins, the possibility of re-excision to achieve free margins, and the possible need for post-operative radiotherapy. The patient was told that juventino staging is not usually required for DCIS, but can be recommended if mastectomy is planned. The reasons for this were explained. I explained that for pure DCIS, for systemic therapy chemotherapy would never be recommended, although endocrine agents such as tamoxifen can be used in women with hormone sensitive disease in order to reduce the risk of local recurrence and future new primaries. She has met with a medical oncologist at [facility] who did start her on low dose tamoxifen. We discussed the results of the NAGEL-01 trial that did not demonstrate a significant risk reduction in pre-menopausal women at the 5mg dose for 3 years compared to placebo.    Ms. Jiang met NCCN criteria for genetic testing. This was performed at [facility] and she is negative for a pathogenic variant.    From a surgery standpoint, Ms. Jiang has a large area of abnormality in the left breast and would not be an ideal candidate for breast conservation on the left. Using individualized shared decision-making, we discussed both the option of a bracketed lumpectomy with oncoplastic closure v. Mastectomy with immediate reconstruction. She is undecided about how she would like to approach this. She is meeting with Dr. Reyes to discuss her reconstruction options directly after this visit. On the right, we do not have a cancer diagnosis in the one site of biopsy. She is not interested in pursuing any additional biopsies on the right breast, and on our imaging review, we feel some of the areas of abnormality on the right could be consistent with fibrocystic change and do not recommend any additional biopsies. For this side we discussed excisional biopsy to rule out underlying malignancy v. Mastectomy. The pros of mastectomy including bilateral  mastectomy would be likely no radiation or tamoxifen (if only DCIS), and no future imaging for surveillance. The cons include longer surgery and healing time, losing the opportunity for imaging surveillance, and loss of sensation across the chest including the nipple, if it is able to be saved. This is a complex decision and she will continue to consider her options.    Following this discussion, where all of the patient's questions were answered, we agreed to proceed with discussing reconstruction options with Dr. Reyes. Possibly leaning toward left mastectomy (skin sparing or nipple sparing) with immediate autologous reconstruction, and right Magseed excisional biopsy +/- reduction/mastopexy. Informed consent will be obtained closer to the time of surgery if she decides to receive her surgical care with us. She will let us know how she would like to proceed. She requests that surgery be in Feb, as she has some travel plans in January and early Feb. Ms. Jiang has no chronic co-morbidities that can increase surgical complications.     Charlette Barnett MD

## 2024-12-12 ENCOUNTER — APPOINTMENT (OUTPATIENT)
Dept: PLASTIC SURGERY | Facility: CLINIC | Age: 59
End: 2024-12-12
Payer: COMMERCIAL

## 2024-12-12 ENCOUNTER — OFFICE VISIT (OUTPATIENT)
Dept: SURGICAL ONCOLOGY | Facility: HOSPITAL | Age: 59
End: 2024-12-12
Payer: COMMERCIAL

## 2024-12-12 VITALS
TEMPERATURE: 97.5 F | HEIGHT: 66 IN | HEART RATE: 86 BPM | SYSTOLIC BLOOD PRESSURE: 115 MMHG | WEIGHT: 142.64 LBS | BODY MASS INDEX: 22.92 KG/M2 | DIASTOLIC BLOOD PRESSURE: 73 MMHG | RESPIRATION RATE: 17 BRPM

## 2024-12-12 DIAGNOSIS — Z71.89 ENCOUNTER TO DISCUSS BREAST RECONSTRUCTION: Primary | ICD-10-CM

## 2024-12-12 DIAGNOSIS — N60.91 ATYPICAL DUCTAL HYPERPLASIA OF RIGHT BREAST: ICD-10-CM

## 2024-12-12 DIAGNOSIS — D05.12 DUCTAL CARCINOMA IN SITU (DCIS) OF LEFT BREAST: Primary | ICD-10-CM

## 2024-12-12 DIAGNOSIS — D05.12 DUCTAL CARCINOMA IN SITU (DCIS) OF LEFT BREAST: ICD-10-CM

## 2024-12-12 DIAGNOSIS — D05.90 BREAST CANCER IN SITU: ICD-10-CM

## 2024-12-12 DIAGNOSIS — Z01.818 PRE-OP EVALUATION: ICD-10-CM

## 2024-12-12 PROCEDURE — 99205 OFFICE O/P NEW HI 60 MIN: CPT

## 2024-12-12 PROCEDURE — 99205 OFFICE O/P NEW HI 60 MIN: CPT | Performed by: SURGERY

## 2024-12-12 PROCEDURE — 3008F BODY MASS INDEX DOCD: CPT | Performed by: SURGERY

## 2024-12-12 PROCEDURE — 99215 OFFICE O/P EST HI 40 MIN: CPT | Performed by: SURGERY

## 2024-12-12 RX ORDER — TAMOXIFEN CITRATE 10 MG/1
TABLET ORAL DAILY
COMMUNITY

## 2024-12-12 ASSESSMENT — ENCOUNTER SYMPTOMS: ARTHRALGIAS: 1

## 2024-12-12 ASSESSMENT — PAIN SCALES - GENERAL: PAINLEVEL_OUTOF10: 2

## 2024-12-18 ENCOUNTER — LAB REQUISITION (OUTPATIENT)
Dept: LAB | Facility: HOSPITAL | Age: 59
End: 2024-12-18
Payer: COMMERCIAL

## 2024-12-18 PROCEDURE — 88321 CONSLTJ&REPRT SLD PREP ELSWR: CPT | Performed by: STUDENT IN AN ORGANIZED HEALTH CARE EDUCATION/TRAINING PROGRAM

## 2024-12-19 LAB
LAB AP ASR DISCLAIMER: NORMAL
LABORATORY COMMENT REPORT: NORMAL
PATH REPORT.FINAL DX SPEC: NORMAL
PATH REPORT.GROSS SPEC: NORMAL
PATH REPORT.RELEVANT HX SPEC: NORMAL
PATH REPORT.TOTAL CANCER: NORMAL

## 2024-12-24 NOTE — PROGRESS NOTES
Subjective :  Patient ID: Ashley Jiang is a 59 y.o. female.    History of Present Illness: Recent diagnosis of left breast DCIS proven biopsy, denies any other medical issues. Non smoker.    Patient presented for follow up after obtaining CTA of abdomen/pelvis. Patient denies any new symptoms or complaints at this time.     Review of Systems  ROS: All 10 systems were reviewed and are unremarkable except for those mentioned in HPI.     Objective :  Physical Exam  Vitals and nursing note reviewed. Exam conducted with a chaperone present.   Visit performed virtually; but given these limitations exam as below.   Constitutional:       General: She is not in acute distress.     Appearance: She is not ill-appearing.   Eyes:      Extraocular Movements: Extraocular movements intact.      Conjunctiva/sclera: Conjunctivae normal.      Pupils: Pupils are equal, round, and reactive to light.   Cardiovascular:      Rate and Rhythm: Normal rate and regular rhythm.      Pulses: Normal pulses.   Pulmonary:      Effort: Pulmonary effort is normal.      Breath sounds: Normal breath sounds.   Abdominal:      Palpations: Abdomen is soft. There is no mass.      Tenderness: There is no abdominal tenderness.      Hernia: No hernia is present.   Musculoskeletal:         General: No swelling or tenderness.      Cervical back: Normal range of motion and neck supple.   Skin:     Capillary Refill: Capillary refill takes less than 2 seconds.      Coloration: Skin is not jaundiced.      Findings: No bruising or rash.   Neurological:      General: No focal deficit present.      Mental Status: She is oriented to person, place, and time.   Psychiatric:         Mood and Affect: Mood normal.         Behavior: Behavior normal.         Thought Content: Thought content normal.         Judgment: Judgment normal.     Assessment/Plan :  Ashley Jiang is a 59 year old female with left breast DCIS and large area of abnormality. Right breast is cancer free based  on site biopsied. Patient is interested in autologous reconstruction with her own tissue. Dr. Barnett and I have already reviewed oncologic and reconstruction options, and patient is in favor of immediate left skin sparing mastectomy and TAMARA flap pending CTA review.     Today, I reviewed the CTA of abdomen and pelvis with patient, and noted sizeable middle row left IDEA  and two medial row right IDEA perforators. We also reviewed the procedure and discussed additional revisions to achieve balanced and symmetrical look. Patient expressed her desire of making the right breast smaller, and I discussed breast lift and small reduction after 4-6 months from the left side reconstruction. Patient would like to proceed with surgery.     Plan:  Surgery is planned in February.

## 2024-12-26 ENCOUNTER — APPOINTMENT (OUTPATIENT)
Facility: HOSPITAL | Age: 59
End: 2024-12-26
Payer: COMMERCIAL

## 2024-12-26 PROBLEM — Z01.818 PRE-OP EVALUATION: Status: ACTIVE | Noted: 2024-12-26

## 2024-12-26 PROBLEM — Z71.89 ENCOUNTER TO DISCUSS BREAST RECONSTRUCTION: Status: ACTIVE | Noted: 2024-12-26

## 2024-12-30 ENCOUNTER — HOSPITAL ENCOUNTER (OUTPATIENT)
Dept: RADIOLOGY | Facility: HOSPITAL | Age: 59
Discharge: HOME | End: 2024-12-30
Payer: COMMERCIAL

## 2024-12-30 DIAGNOSIS — Z01.818 PRE-OP EVALUATION: ICD-10-CM

## 2024-12-30 LAB
CREAT SERPL-MCNC: 0.9 MG/DL (ref 0.6–1.3)
GFR SERPL CREATININE-BSD FRML MDRD: 75 ML/MIN/1.73M*2

## 2024-12-30 PROCEDURE — 74174 CTA ABD&PLVS W/CONTRAST: CPT

## 2024-12-30 PROCEDURE — 82565 ASSAY OF CREATININE: CPT

## 2024-12-30 PROCEDURE — 2550000001 HC RX 255 CONTRASTS

## 2024-12-30 RX ADMIN — IOHEXOL 100 ML: 350 INJECTION, SOLUTION INTRAVENOUS at 09:23

## 2025-01-06 ENCOUNTER — PREP FOR PROCEDURE (OUTPATIENT)
Dept: SURGICAL ONCOLOGY | Facility: CLINIC | Age: 60
End: 2025-01-06

## 2025-01-06 ENCOUNTER — APPOINTMENT (OUTPATIENT)
Dept: PLASTIC SURGERY | Facility: CLINIC | Age: 60
End: 2025-01-06
Payer: COMMERCIAL

## 2025-01-06 DIAGNOSIS — D05.12 DUCTAL CARCINOMA IN SITU (DCIS) OF LEFT BREAST: ICD-10-CM

## 2025-01-06 DIAGNOSIS — N60.91 ATYPICAL DUCTAL HYPERPLASIA OF RIGHT BREAST: ICD-10-CM

## 2025-01-06 DIAGNOSIS — D05.12 DUCTAL CARCINOMA IN SITU (DCIS) OF LEFT BREAST: Primary | ICD-10-CM

## 2025-01-06 DIAGNOSIS — Z71.89 ENCOUNTER TO DISCUSS BREAST RECONSTRUCTION: ICD-10-CM

## 2025-01-06 PROCEDURE — 99212 OFFICE O/P EST SF 10 MIN: CPT

## 2025-01-06 RX ORDER — CEFAZOLIN SODIUM 2 G/100ML
2 INJECTION, SOLUTION INTRAVENOUS ONCE
OUTPATIENT
Start: 2025-01-06 | End: 2025-01-06

## 2025-01-07 DIAGNOSIS — D05.12 DUCTAL CARCINOMA IN SITU (DCIS) OF LEFT BREAST: Primary | ICD-10-CM

## 2025-02-19 ENCOUNTER — PROCEDURE VISIT (OUTPATIENT)
Dept: SURGICAL ONCOLOGY | Facility: CLINIC | Age: 60
End: 2025-02-19
Payer: COMMERCIAL

## 2025-02-19 ENCOUNTER — LAB (OUTPATIENT)
Dept: LAB | Facility: HOSPITAL | Age: 60
End: 2025-02-19
Payer: COMMERCIAL

## 2025-02-19 ENCOUNTER — HOSPITAL ENCOUNTER (OUTPATIENT)
Dept: RADIOLOGY | Facility: CLINIC | Age: 60
Discharge: HOME | End: 2025-02-19
Payer: COMMERCIAL

## 2025-02-19 ENCOUNTER — LAB REQUISITION (OUTPATIENT)
Dept: LAB | Facility: HOSPITAL | Age: 60
End: 2025-02-19
Payer: COMMERCIAL

## 2025-02-19 VITALS
OXYGEN SATURATION: 99 % | SYSTOLIC BLOOD PRESSURE: 129 MMHG | HEART RATE: 85 BPM | TEMPERATURE: 98.1 F | WEIGHT: 145.06 LBS | DIASTOLIC BLOOD PRESSURE: 85 MMHG | BODY MASS INDEX: 23.41 KG/M2

## 2025-02-19 DIAGNOSIS — D05.12 DUCTAL CARCINOMA IN SITU (DCIS) OF LEFT BREAST: ICD-10-CM

## 2025-02-19 DIAGNOSIS — N60.91 ATYPICAL DUCTAL HYPERPLASIA OF RIGHT BREAST: ICD-10-CM

## 2025-02-19 DIAGNOSIS — R92.8 OTHER ABNORMAL AND INCONCLUSIVE FINDINGS ON DIAGNOSTIC IMAGING OF BREAST: ICD-10-CM

## 2025-02-19 DIAGNOSIS — D05.12 INTRADUCTAL CARCINOMA IN SITU OF LEFT BREAST: Primary | ICD-10-CM

## 2025-02-19 DIAGNOSIS — D05.12 DUCTAL CARCINOMA IN SITU (DCIS) OF LEFT BREAST: Primary | ICD-10-CM

## 2025-02-19 DIAGNOSIS — D05.12 INTRADUCTAL CARCINOMA IN SITU OF LEFT BREAST: ICD-10-CM

## 2025-02-19 LAB
ABO GROUP (TYPE) IN BLOOD: NORMAL
ANION GAP SERPL CALC-SCNC: 12 MMOL/L (ref 10–20)
ANTIBODY SCREEN: NORMAL
BUN SERPL-MCNC: 23 MG/DL (ref 6–23)
CALCIUM SERPL-MCNC: 9.7 MG/DL (ref 8.6–10.3)
CHLORIDE SERPL-SCNC: 102 MMOL/L (ref 98–107)
CO2 SERPL-SCNC: 28 MMOL/L (ref 21–32)
CREAT SERPL-MCNC: 0.87 MG/DL (ref 0.5–1.05)
EGFRCR SERPLBLD CKD-EPI 2021: 78 ML/MIN/1.73M*2
ERYTHROCYTE [DISTWIDTH] IN BLOOD BY AUTOMATED COUNT: 11.7 % (ref 11.5–14.5)
GLUCOSE SERPL-MCNC: 100 MG/DL (ref 74–99)
HCT VFR BLD AUTO: 41 % (ref 36–46)
HGB BLD-MCNC: 13.6 G/DL (ref 12–16)
MCH RBC QN AUTO: 30.8 PG (ref 26–34)
MCHC RBC AUTO-ENTMCNC: 33.2 G/DL (ref 32–36)
MCV RBC AUTO: 93 FL (ref 80–100)
NRBC BLD-RTO: 0 /100 WBCS (ref 0–0)
PLATELET # BLD AUTO: 304 X10*3/UL (ref 150–450)
POTASSIUM SERPL-SCNC: 4.5 MMOL/L (ref 3.5–5.3)
RBC # BLD AUTO: 4.41 X10*6/UL (ref 4–5.2)
RH FACTOR (ANTIGEN D): NORMAL
SODIUM SERPL-SCNC: 137 MMOL/L (ref 136–145)
WBC # BLD AUTO: 7.3 X10*3/UL (ref 4.4–11.3)

## 2025-02-19 PROCEDURE — 86901 BLOOD TYPING SEROLOGIC RH(D): CPT

## 2025-02-19 PROCEDURE — 85027 COMPLETE CBC AUTOMATED: CPT

## 2025-02-19 PROCEDURE — 99213 OFFICE O/P EST LOW 20 MIN: CPT | Performed by: NURSE PRACTITIONER

## 2025-02-19 PROCEDURE — 2500000004 HC RX 250 GENERAL PHARMACY W/ HCPCS (ALT 636 FOR OP/ED): Performed by: STUDENT IN AN ORGANIZED HEALTH CARE EDUCATION/TRAINING PROGRAM

## 2025-02-19 PROCEDURE — 80048 BASIC METABOLIC PNL TOTAL CA: CPT

## 2025-02-19 PROCEDURE — 86850 RBC ANTIBODY SCREEN: CPT

## 2025-02-19 PROCEDURE — 76642 ULTRASOUND BREAST LIMITED: CPT | Mod: RT,RSC

## 2025-02-19 PROCEDURE — A4648 IMPLANTABLE TISSUE MARKER: HCPCS

## 2025-02-19 PROCEDURE — 19281 PERQ DEVICE BREAST 1ST IMAG: CPT | Mod: RT

## 2025-02-19 PROCEDURE — 2780000003 HC OR 278 NO HCPCS

## 2025-02-19 PROCEDURE — 86900 BLOOD TYPING SEROLOGIC ABO: CPT

## 2025-02-19 RX ADMIN — Medication 10 ML: at 15:00

## 2025-02-19 ASSESSMENT — PAIN SCALES - GENERAL
PAINLEVEL_OUTOF10: 0-NO PAIN
PAINLEVEL_OUTOF10: 0 - NO PAIN
PAINLEVEL_OUTOF10: 0 - NO PAIN

## 2025-02-19 NOTE — DISCHARGE INSTRUCTIONS
Post procedure care reviewed with patient, ok to resume normal activity with no restrictions. Patient verbalized understanding and will follow up surgery as planned. Patient left at [id number]

## 2025-02-20 ENCOUNTER — ANESTHESIA EVENT (OUTPATIENT)
Dept: OPERATING ROOM | Facility: HOSPITAL | Age: 60
DRG: 580 | End: 2025-02-20
Payer: COMMERCIAL

## 2025-02-20 RX ORDER — AZITHROMYCIN 250 MG/1
TABLET, FILM COATED ORAL
Status: ON HOLD | COMMUNITY

## 2025-02-20 NOTE — PROGRESS NOTES
Pharmacy Medication History Review    Ashley Jiang is a 59 y.o. female who is planned to be admitted for No Principal Problem: There is no principal problem currently on the Problem List. Please update the Problem List and refresh.. Pharmacy called the patient prior to their scheduled procedure and reviewed the patient's [id number] medications for accuracy.    Medications ADDED:  Azithromycin 250mg  Medications CHANGED:  none  Medications REMOVED:   none    Please review updated prior to admission medication list and comments regarding how patient may be taking medications differently by going to Admission tab --> Admission Orders --> Admit Orders / Review prior to admission medications.     Preferred pharmacy, last doses of medications, and allergies to be confirmed with patient by nursing the day of procedure.     Sources used to complete the med history include:  [facility]  Pharmacy dispense history  Patient interview  Chart Review  Care Everywhere     Below are additional concerns with the patient's PTA list.  Patient states they started z-gena on 02/17/25. Last day of therapy is on 02/21/25. L.F. 02/17/25 #6/5d  Patient states tamoxifen 10mg is on hold per doctor's orders. Last taken in december    [facility]  Please reach out via Secure Chat for questions

## 2025-02-21 ENCOUNTER — ANESTHESIA (OUTPATIENT)
Dept: OPERATING ROOM | Facility: HOSPITAL | Age: 60
DRG: 580 | End: 2025-02-21
Payer: COMMERCIAL

## 2025-02-21 ENCOUNTER — HOSPITAL ENCOUNTER (INPATIENT)
Facility: HOSPITAL | Age: 60
End: 2025-02-21
Attending: SURGERY | Admitting: NURSE PRACTITIONER
Payer: COMMERCIAL

## 2025-02-21 ENCOUNTER — HOSPITAL ENCOUNTER (OUTPATIENT)
Dept: RADIOLOGY | Facility: HOSPITAL | Age: 60
Setting detail: SURGERY ADMIT
Discharge: HOME | End: 2025-02-21
Payer: COMMERCIAL

## 2025-02-21 ENCOUNTER — APPOINTMENT (OUTPATIENT)
Dept: RADIOLOGY | Facility: HOSPITAL | Age: 60
DRG: 580 | End: 2025-02-21
Payer: COMMERCIAL

## 2025-02-21 DIAGNOSIS — N60.91 UNSPECIFIED BENIGN MAMMARY DYSPLASIA OF RIGHT BREAST: ICD-10-CM

## 2025-02-21 DIAGNOSIS — N60.91 ATYPICAL DUCTAL HYPERPLASIA OF RIGHT BREAST: ICD-10-CM

## 2025-02-21 DIAGNOSIS — Z98.890 STATUS POST RECONSTRUCTION PROCEDURE: ICD-10-CM

## 2025-02-21 DIAGNOSIS — D05.12 DUCTAL CARCINOMA IN SITU (DCIS) OF LEFT BREAST: Primary | ICD-10-CM

## 2025-02-21 DIAGNOSIS — D05.12 DUCTAL CARCINOMA IN SITU (DCIS) OF LEFT BREAST: ICD-10-CM

## 2025-02-21 DIAGNOSIS — G89.18 ACUTE POSTOPERATIVE PAIN: ICD-10-CM

## 2025-02-21 DIAGNOSIS — R92.8 OTHER ABNORMAL AND INCONCLUSIVE FINDINGS ON DIAGNOSTIC IMAGING OF BREAST: ICD-10-CM

## 2025-02-21 PROBLEM — C50.919 BREAST CANCER (MULTI): Status: ACTIVE | Noted: 2025-02-21

## 2025-02-21 LAB
ABO GROUP (TYPE) IN BLOOD: NORMAL
ALBUMIN SERPL BCP-MCNC: 4.4 G/DL (ref 3.4–5)
ANION GAP SERPL CALC-SCNC: 16 MMOL/L (ref 10–20)
BUN SERPL-MCNC: 12 MG/DL (ref 6–23)
CALCIUM SERPL-MCNC: 8.8 MG/DL (ref 8.6–10.6)
CHLORIDE SERPL-SCNC: 103 MMOL/L (ref 98–107)
CO2 SERPL-SCNC: 23 MMOL/L (ref 21–32)
CREAT SERPL-MCNC: 0.78 MG/DL (ref 0.5–1.05)
EGFRCR SERPLBLD CKD-EPI 2021: 89 ML/MIN/1.73M*2
ERYTHROCYTE [DISTWIDTH] IN BLOOD BY AUTOMATED COUNT: 11.8 % (ref 11.5–14.5)
GLUCOSE SERPL-MCNC: 136 MG/DL (ref 74–99)
HCT VFR BLD AUTO: 32.8 % (ref 36–46)
HGB BLD-MCNC: 11 G/DL (ref 12–16)
MAGNESIUM SERPL-MCNC: 1.69 MG/DL (ref 1.6–2.4)
MCH RBC QN AUTO: 31.9 PG (ref 26–34)
MCHC RBC AUTO-ENTMCNC: 33.5 G/DL (ref 32–36)
MCV RBC AUTO: 95 FL (ref 80–100)
NRBC BLD-RTO: 0 /100 WBCS (ref 0–0)
PHOSPHATE SERPL-MCNC: 4.2 MG/DL (ref 2.5–4.9)
PLATELET # BLD AUTO: 240 X10*3/UL (ref 150–450)
POTASSIUM SERPL-SCNC: 4.4 MMOL/L (ref 3.5–5.3)
RBC # BLD AUTO: 3.45 X10*6/UL (ref 4–5.2)
RH FACTOR (ANTIGEN D): NORMAL
SODIUM SERPL-SCNC: 138 MMOL/L (ref 136–145)
WBC # BLD AUTO: 16.8 X10*3/UL (ref 4.4–11.3)

## 2025-02-21 PROCEDURE — 2500000004 HC RX 250 GENERAL PHARMACY W/ HCPCS (ALT 636 FOR OP/ED): Mod: JZ,TB | Performed by: ANESTHESIOLOGIST ASSISTANT

## 2025-02-21 PROCEDURE — 88307 TISSUE EXAM BY PATHOLOGIST: CPT | Mod: TC,SUR,WESLAB | Performed by: SURGERY

## 2025-02-21 PROCEDURE — 99232 SBSQ HOSP IP/OBS MODERATE 35: CPT | Performed by: NURSE PRACTITIONER

## 2025-02-21 PROCEDURE — 1170000001 HC PRIVATE ONCOLOGY ROOM DAILY

## 2025-02-21 PROCEDURE — 76098 X-RAY EXAM SURGICAL SPECIMEN: CPT | Performed by: SURGERY

## 2025-02-21 PROCEDURE — 96372 THER/PROPH/DIAG INJ SC/IM: CPT | Performed by: PHYSICIAN ASSISTANT

## 2025-02-21 PROCEDURE — 2720000007 HC OR 272 NO HCPCS: Performed by: SURGERY

## 2025-02-21 PROCEDURE — 3600000004 HC OR TIME - INITIAL BASE CHARGE - PROCEDURE LEVEL FOUR: Performed by: SURGERY

## 2025-02-21 PROCEDURE — 3600000009 HC OR TIME - EACH INCREMENTAL 1 MINUTE - PROCEDURE LEVEL FOUR: Performed by: SURGERY

## 2025-02-21 PROCEDURE — 88307 TISSUE EXAM BY PATHOLOGIST: CPT | Performed by: STUDENT IN AN ORGANIZED HEALTH CARE EDUCATION/TRAINING PROGRAM

## 2025-02-21 PROCEDURE — A38525 PR BX/REMV,LYMPH NODE,DEEP AXILL: Performed by: STUDENT IN AN ORGANIZED HEALTH CARE EDUCATION/TRAINING PROGRAM

## 2025-02-21 PROCEDURE — 38900 IO MAP OF SENT LYMPH NODE: CPT | Performed by: SURGERY

## 2025-02-21 PROCEDURE — 38525 BIOPSY/REMOVAL LYMPH NODES: CPT | Performed by: SURGERY

## 2025-02-21 PROCEDURE — 2500000001 HC RX 250 WO HCPCS SELF ADMINISTERED DRUGS (ALT 637 FOR MEDICARE OP): Performed by: NURSE PRACTITIONER

## 2025-02-21 PROCEDURE — 7100000002 HC RECOVERY ROOM TIME - EACH INCREMENTAL 1 MINUTE: Performed by: SURGERY

## 2025-02-21 PROCEDURE — 2500000005 HC RX 250 GENERAL PHARMACY W/O HCPCS

## 2025-02-21 PROCEDURE — 96372 THER/PROPH/DIAG INJ SC/IM: CPT | Performed by: ANESTHESIOLOGIST ASSISTANT

## 2025-02-21 PROCEDURE — A38525 PR BX/REMV,LYMPH NODE,DEEP AXILL: Performed by: ANESTHESIOLOGIST ASSISTANT

## 2025-02-21 PROCEDURE — 0HRU077 REPLACEMENT OF LEFT BREAST USING DEEP INFERIOR EPIGASTRIC ARTERY PERFORATOR FLAP, OPEN APPROACH: ICD-10-PCS

## 2025-02-21 PROCEDURE — 38792 RA TRACER ID OF SENTINL NODE: CPT | Performed by: SURGERY

## 2025-02-21 PROCEDURE — 2500000005 HC RX 250 GENERAL PHARMACY W/O HCPCS: Performed by: ANESTHESIOLOGIST ASSISTANT

## 2025-02-21 PROCEDURE — 14000 TIS TRNFR TRUNK 10 SQ CM/<: CPT | Performed by: SURGERY

## 2025-02-21 PROCEDURE — 19303 MAST SIMPLE COMPLETE: CPT | Performed by: SURGERY

## 2025-02-21 PROCEDURE — 36415 COLL VENOUS BLD VENIPUNCTURE: CPT | Performed by: STUDENT IN AN ORGANIZED HEALTH CARE EDUCATION/TRAINING PROGRAM

## 2025-02-21 PROCEDURE — 83735 ASSAY OF MAGNESIUM: CPT | Performed by: PHYSICIAN ASSISTANT

## 2025-02-21 PROCEDURE — 2500000001 HC RX 250 WO HCPCS SELF ADMINISTERED DRUGS (ALT 637 FOR MEDICARE OP): Performed by: PHYSICIAN ASSISTANT

## 2025-02-21 PROCEDURE — 0HBT0ZX EXCISION OF RIGHT BREAST, OPEN APPROACH, DIAGNOSTIC: ICD-10-PCS | Performed by: SURGERY

## 2025-02-21 PROCEDURE — 3700000001 HC GENERAL ANESTHESIA TIME - INITIAL BASE CHARGE: Performed by: SURGERY

## 2025-02-21 PROCEDURE — 07B60ZX EXCISION OF LEFT AXILLARY LYMPHATIC, OPEN APPROACH, DIAGNOSTIC: ICD-10-PCS | Performed by: SURGERY

## 2025-02-21 PROCEDURE — 2500000004 HC RX 250 GENERAL PHARMACY W/ HCPCS (ALT 636 FOR OP/ED): Performed by: PHYSICIAN ASSISTANT

## 2025-02-21 PROCEDURE — 2500000005 HC RX 250 GENERAL PHARMACY W/O HCPCS: Performed by: SURGERY

## 2025-02-21 PROCEDURE — 36415 COLL VENOUS BLD VENIPUNCTURE: CPT | Performed by: PHYSICIAN ASSISTANT

## 2025-02-21 PROCEDURE — 38792 RA TRACER ID OF SENTINL NODE: CPT

## 2025-02-21 PROCEDURE — P9045 ALBUMIN (HUMAN), 5%, 250 ML: HCPCS | Mod: JZ,TB | Performed by: ANESTHESIOLOGIST ASSISTANT

## 2025-02-21 PROCEDURE — 85027 COMPLETE CBC AUTOMATED: CPT | Performed by: PHYSICIAN ASSISTANT

## 2025-02-21 PROCEDURE — 2500000004 HC RX 250 GENERAL PHARMACY W/ HCPCS (ALT 636 FOR OP/ED): Performed by: STUDENT IN AN ORGANIZED HEALTH CARE EDUCATION/TRAINING PROGRAM

## 2025-02-21 PROCEDURE — 3430000001 HC RX 343 DIAGNOSTIC RADIOPHARMACEUTICALS: Performed by: SURGERY

## 2025-02-21 PROCEDURE — 19125 EXCISION BREAST LESION: CPT | Performed by: SURGERY

## 2025-02-21 PROCEDURE — 2500000005 HC RX 250 GENERAL PHARMACY W/O HCPCS: Performed by: STUDENT IN AN ORGANIZED HEALTH CARE EDUCATION/TRAINING PROGRAM

## 2025-02-21 PROCEDURE — 80069 RENAL FUNCTION PANEL: CPT | Performed by: PHYSICIAN ASSISTANT

## 2025-02-21 PROCEDURE — 76098 X-RAY EXAM SURGICAL SPECIMEN: CPT

## 2025-02-21 PROCEDURE — 19364 BRST RCNSTJ FREE FLAP: CPT

## 2025-02-21 PROCEDURE — 15860 IV NJX TST VASC FLO FLAP/GRF: CPT

## 2025-02-21 PROCEDURE — 0HTU0ZZ RESECTION OF LEFT BREAST, OPEN APPROACH: ICD-10-PCS | Performed by: SURGERY

## 2025-02-21 PROCEDURE — 2500000004 HC RX 250 GENERAL PHARMACY W/ HCPCS (ALT 636 FOR OP/ED): Mod: TB | Performed by: STUDENT IN AN ORGANIZED HEALTH CARE EDUCATION/TRAINING PROGRAM

## 2025-02-21 PROCEDURE — 3700000002 HC GENERAL ANESTHESIA TIME - EACH INCREMENTAL 1 MINUTE: Performed by: SURGERY

## 2025-02-21 PROCEDURE — 7100000001 HC RECOVERY ROOM TIME - INITIAL BASE CHARGE: Performed by: SURGERY

## 2025-02-21 PROCEDURE — 99223 1ST HOSP IP/OBS HIGH 75: CPT | Performed by: STUDENT IN AN ORGANIZED HEALTH CARE EDUCATION/TRAINING PROGRAM

## 2025-02-21 PROCEDURE — A9520 TC99 TILMANOCEPT DIAG 0.5MCI: HCPCS | Performed by: SURGERY

## 2025-02-21 RX ORDER — MIDAZOLAM HYDROCHLORIDE 1 MG/ML
INJECTION INTRAMUSCULAR; INTRAVENOUS AS NEEDED
Status: DISCONTINUED | OUTPATIENT
Start: 2025-02-21 | End: 2025-02-21

## 2025-02-21 RX ORDER — OXYCODONE HYDROCHLORIDE 5 MG/1
5 TABLET ORAL EVERY 4 HOURS PRN
Status: DISCONTINUED | OUTPATIENT
Start: 2025-02-21 | End: 2025-02-21 | Stop reason: HOSPADM

## 2025-02-21 RX ORDER — ROPIVACAINE HYDROCHLORIDE 5 MG/ML
INJECTION, SOLUTION EPIDURAL; INFILTRATION; PERINEURAL AS NEEDED
Status: DISCONTINUED | OUTPATIENT
Start: 2025-02-21 | End: 2025-02-21

## 2025-02-21 RX ORDER — ALBUMIN HUMAN 50 G/1000ML
SOLUTION INTRAVENOUS AS NEEDED
Status: DISCONTINUED | OUTPATIENT
Start: 2025-02-21 | End: 2025-02-21

## 2025-02-21 RX ORDER — ONDANSETRON HYDROCHLORIDE 2 MG/ML
4 INJECTION, SOLUTION INTRAVENOUS ONCE AS NEEDED
Status: DISCONTINUED | OUTPATIENT
Start: 2025-02-21 | End: 2025-02-21 | Stop reason: HOSPADM

## 2025-02-21 RX ORDER — ONDANSETRON 4 MG/1
4 TABLET, FILM COATED ORAL EVERY 8 HOURS PRN
Status: ACTIVE | OUTPATIENT
Start: 2025-02-21

## 2025-02-21 RX ORDER — INDOCYANINE GREEN AND WATER 25 MG
KIT INJECTION AS NEEDED
Status: DISCONTINUED | OUTPATIENT
Start: 2025-02-21 | End: 2025-02-21

## 2025-02-21 RX ORDER — SODIUM CHLORIDE 9 MG/ML
100 INJECTION, SOLUTION INTRAVENOUS CONTINUOUS
Status: DISCONTINUED | OUTPATIENT
Start: 2025-02-21 | End: 2025-02-22

## 2025-02-21 RX ORDER — ACETAMINOPHEN 10 MG/ML
INJECTION, SOLUTION INTRAVENOUS AS NEEDED
Status: DISCONTINUED | OUTPATIENT
Start: 2025-02-21 | End: 2025-02-21

## 2025-02-21 RX ORDER — ACETAMINOPHEN 325 MG/1
975 TABLET ORAL EVERY 8 HOURS
Status: DISPENSED | OUTPATIENT
Start: 2025-02-21

## 2025-02-21 RX ORDER — HYDROMORPHONE HYDROCHLORIDE 0.2 MG/ML
0.2 INJECTION INTRAMUSCULAR; INTRAVENOUS; SUBCUTANEOUS EVERY 5 MIN PRN
Status: DISCONTINUED | OUTPATIENT
Start: 2025-02-21 | End: 2025-02-21 | Stop reason: HOSPADM

## 2025-02-21 RX ORDER — SODIUM CHLORIDE 0.9 G/100ML
INJECTION, SOLUTION IRRIGATION AS NEEDED
Status: DISCONTINUED | OUTPATIENT
Start: 2025-02-21 | End: 2025-02-21 | Stop reason: HOSPADM

## 2025-02-21 RX ORDER — VECURONIUM BROMIDE 1 MG/ML
INJECTION, POWDER, LYOPHILIZED, FOR SOLUTION INTRAVENOUS AS NEEDED
Status: DISCONTINUED | OUTPATIENT
Start: 2025-02-21 | End: 2025-02-21

## 2025-02-21 RX ORDER — GLYCOPYRROLATE 0.2 MG/ML
INJECTION INTRAMUSCULAR; INTRAVENOUS AS NEEDED
Status: DISCONTINUED | OUTPATIENT
Start: 2025-02-21 | End: 2025-02-21

## 2025-02-21 RX ORDER — BUTALBITAL, ACETAMINOPHEN AND CAFFEINE 50; 325; 40 MG/1; MG/1; MG/1
1 TABLET ORAL ONCE
Status: COMPLETED | OUTPATIENT
Start: 2025-02-21 | End: 2025-02-21

## 2025-02-21 RX ORDER — BACITRACIN 500 [USP'U]/G
OINTMENT TOPICAL AS NEEDED
Status: DISCONTINUED | OUTPATIENT
Start: 2025-02-21 | End: 2025-02-21 | Stop reason: HOSPADM

## 2025-02-21 RX ORDER — ONDANSETRON HYDROCHLORIDE 2 MG/ML
INJECTION, SOLUTION INTRAVENOUS AS NEEDED
Status: DISCONTINUED | OUTPATIENT
Start: 2025-02-21 | End: 2025-02-21

## 2025-02-21 RX ORDER — ROPIVACAINE IN 0.9% SOD CHL/PF 0.2 %
10 PLASTIC BAG, INJECTION (ML) EPIDURAL CONTINUOUS
Status: DISPENSED | OUTPATIENT
Start: 2025-02-21

## 2025-02-21 RX ORDER — ACETAMINOPHEN 325 MG/1
650 TABLET ORAL EVERY 4 HOURS PRN
Status: DISCONTINUED | OUTPATIENT
Start: 2025-02-21 | End: 2025-02-21 | Stop reason: HOSPADM

## 2025-02-21 RX ORDER — FENTANYL CITRATE 50 UG/ML
INJECTION, SOLUTION INTRAMUSCULAR; INTRAVENOUS AS NEEDED
Status: DISCONTINUED | OUTPATIENT
Start: 2025-02-21 | End: 2025-02-21

## 2025-02-21 RX ORDER — ROCURONIUM BROMIDE 10 MG/ML
INJECTION, SOLUTION INTRAVENOUS AS NEEDED
Status: DISCONTINUED | OUTPATIENT
Start: 2025-02-21 | End: 2025-02-21

## 2025-02-21 RX ORDER — LABETALOL HYDROCHLORIDE 5 MG/ML
5 INJECTION, SOLUTION INTRAVENOUS ONCE AS NEEDED
Status: DISCONTINUED | OUTPATIENT
Start: 2025-02-21 | End: 2025-02-21 | Stop reason: HOSPADM

## 2025-02-21 RX ORDER — PHENYLEPHRINE HCL IN 0.9% NACL 1 MG/10 ML
SYRINGE (ML) INTRAVENOUS AS NEEDED
Status: DISCONTINUED | OUTPATIENT
Start: 2025-02-21 | End: 2025-02-21

## 2025-02-21 RX ORDER — PANTOPRAZOLE SODIUM 40 MG/1
40 TABLET, DELAYED RELEASE ORAL
Status: DISPENSED | OUTPATIENT
Start: 2025-02-22

## 2025-02-21 RX ORDER — DOCUSATE SODIUM 100 MG/1
100 CAPSULE, LIQUID FILLED ORAL 2 TIMES DAILY
Status: DISPENSED | OUTPATIENT
Start: 2025-02-21

## 2025-02-21 RX ORDER — PROPOFOL 10 MG/ML
INJECTION, EMULSION INTRAVENOUS AS NEEDED
Status: DISCONTINUED | OUTPATIENT
Start: 2025-02-21 | End: 2025-02-21

## 2025-02-21 RX ORDER — METHOCARBAMOL 500 MG/1
500 TABLET, FILM COATED ORAL EVERY 6 HOURS SCHEDULED
Status: DISPENSED | OUTPATIENT
Start: 2025-02-22

## 2025-02-21 RX ORDER — ACETAMINOPHEN 325 MG/1
975 TABLET ORAL EVERY 8 HOURS
OUTPATIENT
Start: 2025-02-21

## 2025-02-21 RX ORDER — HEPARIN SODIUM 5000 [USP'U]/ML
5000 INJECTION, SOLUTION INTRAVENOUS; SUBCUTANEOUS EVERY 8 HOURS
Status: DISPENSED | OUTPATIENT
Start: 2025-02-21

## 2025-02-21 RX ORDER — CEFAZOLIN 1 G/1
INJECTION, POWDER, FOR SOLUTION INTRAVENOUS AS NEEDED
Status: DISCONTINUED | OUTPATIENT
Start: 2025-02-21 | End: 2025-02-21

## 2025-02-21 RX ORDER — SODIUM CHLORIDE, SODIUM LACTATE, POTASSIUM CHLORIDE, CALCIUM CHLORIDE 600; 310; 30; 20 MG/100ML; MG/100ML; MG/100ML; MG/100ML
100 INJECTION, SOLUTION INTRAVENOUS CONTINUOUS
Status: DISCONTINUED | OUTPATIENT
Start: 2025-02-21 | End: 2025-02-21 | Stop reason: HOSPADM

## 2025-02-21 RX ORDER — CELECOXIB 50 MG/1
50 CAPSULE ORAL 2 TIMES DAILY
OUTPATIENT
Start: 2025-02-22

## 2025-02-21 RX ORDER — ONDANSETRON HYDROCHLORIDE 2 MG/ML
4 INJECTION, SOLUTION INTRAVENOUS EVERY 8 HOURS PRN
Status: ACTIVE | OUTPATIENT
Start: 2025-02-21

## 2025-02-21 RX ORDER — ACETAMINOPHEN 325 MG/1
650 TABLET ORAL ONCE
Status: COMPLETED | OUTPATIENT
Start: 2025-02-22 | End: 2025-02-21

## 2025-02-21 RX ORDER — ADHESIVE BANDAGE
30 BANDAGE TOPICAL DAILY PRN
Status: DISPENSED | OUTPATIENT
Start: 2025-02-21

## 2025-02-21 RX ORDER — ALBUTEROL SULFATE 0.83 MG/ML
2.5 SOLUTION RESPIRATORY (INHALATION) ONCE AS NEEDED
Status: DISCONTINUED | OUTPATIENT
Start: 2025-02-21 | End: 2025-02-21 | Stop reason: HOSPADM

## 2025-02-21 RX ORDER — ACETAMINOPHEN 325 MG/1
975 TABLET ORAL ONCE
Status: COMPLETED | OUTPATIENT
Start: 2025-02-21 | End: 2025-02-21

## 2025-02-21 RX ORDER — CEFAZOLIN SODIUM 2 G/100ML
2 INJECTION, SOLUTION INTRAVENOUS EVERY 8 HOURS
Status: COMPLETED | OUTPATIENT
Start: 2025-02-22 | End: 2025-02-22

## 2025-02-21 RX ORDER — HYDROMORPHONE HYDROCHLORIDE 1 MG/ML
INJECTION, SOLUTION INTRAMUSCULAR; INTRAVENOUS; SUBCUTANEOUS AS NEEDED
Status: DISCONTINUED | OUTPATIENT
Start: 2025-02-21 | End: 2025-02-21

## 2025-02-21 RX ORDER — PANTOPRAZOLE SODIUM 40 MG/10ML
40 INJECTION, POWDER, LYOPHILIZED, FOR SOLUTION INTRAVENOUS
Status: DISPENSED | OUTPATIENT
Start: 2025-02-22

## 2025-02-21 RX ORDER — ASPIRIN 81 MG/1
81 TABLET ORAL DAILY
Status: DISPENSED | OUTPATIENT
Start: 2025-02-22

## 2025-02-21 RX ORDER — HEPARIN SODIUM 5000 [USP'U]/ML
5000 INJECTION, SOLUTION INTRAVENOUS; SUBCUTANEOUS ONCE
Status: COMPLETED | OUTPATIENT
Start: 2025-02-21 | End: 2025-02-21

## 2025-02-21 RX ORDER — LIDOCAINE HYDROCHLORIDE 20 MG/ML
INJECTION, SOLUTION INFILTRATION; PERINEURAL AS NEEDED
Status: DISCONTINUED | OUTPATIENT
Start: 2025-02-21 | End: 2025-02-21

## 2025-02-21 RX ORDER — HEPARIN SODIUM 5000 [USP'U]/ML
INJECTION, SOLUTION INTRAVENOUS; SUBCUTANEOUS AS NEEDED
Status: DISCONTINUED | OUTPATIENT
Start: 2025-02-21 | End: 2025-02-21

## 2025-02-21 RX ADMIN — ROCURONIUM 20 MG: 50 INJECTION, SOLUTION INTRAVENOUS at 10:09

## 2025-02-21 RX ADMIN — VECURONIUM BROMIDE 1 MG: 10 INJECTION, POWDER, LYOPHILIZED, FOR SOLUTION INTRAVENOUS at 14:35

## 2025-02-21 RX ADMIN — HYDROMORPHONE HYDROCHLORIDE 0.25 MG: 1 INJECTION, SOLUTION INTRAMUSCULAR; INTRAVENOUS; SUBCUTANEOUS at 17:30

## 2025-02-21 RX ADMIN — ROCURONIUM 20 MG: 50 INJECTION, SOLUTION INTRAVENOUS at 12:40

## 2025-02-21 RX ADMIN — Medication 3 L/MIN: at 19:00

## 2025-02-21 RX ADMIN — DEXAMETHASONE SODIUM PHOSPHATE 4 MG: 4 INJECTION INTRA-ARTICULAR; INTRALESIONAL; INTRAMUSCULAR; INTRAVENOUS; SOFT TISSUE at 08:42

## 2025-02-21 RX ADMIN — ACETAMINOPHEN 975 MG: 325 TABLET ORAL at 07:10

## 2025-02-21 RX ADMIN — INDOCYANINE GREEN AND WATER 7.5 MG: KIT at 15:56

## 2025-02-21 RX ADMIN — SUGAMMADEX 200 MG: 100 INJECTION, SOLUTION INTRAVENOUS at 18:16

## 2025-02-21 RX ADMIN — ALBUMIN HUMAN 250 ML: 0.05 INJECTION, SOLUTION INTRAVENOUS at 11:16

## 2025-02-21 RX ADMIN — VECURONIUM BROMIDE 2 MG: 10 INJECTION, POWDER, LYOPHILIZED, FOR SOLUTION INTRAVENOUS at 13:45

## 2025-02-21 RX ADMIN — MIDAZOLAM HYDROCHLORIDE 2 MG: 1 INJECTION, SOLUTION INTRAMUSCULAR; INTRAVENOUS at 07:56

## 2025-02-21 RX ADMIN — ROCURONIUM 10 MG: 50 INJECTION, SOLUTION INTRAVENOUS at 09:12

## 2025-02-21 RX ADMIN — ONDANSETRON 4 MG: 2 INJECTION INTRAMUSCULAR; INTRAVENOUS at 17:49

## 2025-02-21 RX ADMIN — HYDROMORPHONE HYDROCHLORIDE 0.25 MG: 1 INJECTION, SOLUTION INTRAMUSCULAR; INTRAVENOUS; SUBCUTANEOUS at 17:28

## 2025-02-21 RX ADMIN — ROCURONIUM 40 MG: 50 INJECTION, SOLUTION INTRAVENOUS at 08:35

## 2025-02-21 RX ADMIN — ROPIVACAINE HYDROCHLORIDE 35 ML: 5 INJECTION, SOLUTION EPIDURAL; INFILTRATION; PERINEURAL at 07:18

## 2025-02-21 RX ADMIN — Medication 0.62 MILLICURIE: at 08:08

## 2025-02-21 RX ADMIN — HEPARIN SODIUM 5000 UNITS: 5000 INJECTION, SOLUTION INTRAVENOUS; SUBCUTANEOUS at 22:25

## 2025-02-21 RX ADMIN — BUTALBITAL, ACETAMINOPHEN, AND CAFFEINE 1 TABLET: 325; 50; 40 TABLET ORAL at 23:38

## 2025-02-21 RX ADMIN — ACETAMINOPHEN 1000 MG: 10 INJECTION, SOLUTION INTRAVENOUS at 15:19

## 2025-02-21 RX ADMIN — ROCURONIUM 60 MG: 50 INJECTION, SOLUTION INTRAVENOUS at 08:00

## 2025-02-21 RX ADMIN — ALBUMIN HUMAN 250 ML: 0.05 INJECTION, SOLUTION INTRAVENOUS at 15:46

## 2025-02-21 RX ADMIN — GLYCOPYRROLATE 0.2 MG: 0.2 INJECTION INTRAMUSCULAR; INTRAVENOUS at 17:50

## 2025-02-21 RX ADMIN — HEPARIN SODIUM 5000 UNITS: 5000 INJECTION INTRAVENOUS; SUBCUTANEOUS at 15:20

## 2025-02-21 RX ADMIN — MIDAZOLAM HYDROCHLORIDE 2 MG: 1 INJECTION, SOLUTION INTRAMUSCULAR; INTRAVENOUS at 07:07

## 2025-02-21 RX ADMIN — HYDROMORPHONE HYDROCHLORIDE 0.5 MG: 1 INJECTION, SOLUTION INTRAMUSCULAR; INTRAVENOUS; SUBCUTANEOUS at 17:59

## 2025-02-21 RX ADMIN — LIDOCAINE HYDROCHLORIDE 70 MG: 20 INJECTION, SOLUTION INFILTRATION; PERINEURAL at 07:59

## 2025-02-21 RX ADMIN — VECURONIUM BROMIDE 2 MG: 10 INJECTION, POWDER, LYOPHILIZED, FOR SOLUTION INTRAVENOUS at 15:13

## 2025-02-21 RX ADMIN — CEFAZOLIN 2 G: 1 INJECTION, POWDER, FOR SOLUTION INTRAMUSCULAR; INTRAVENOUS at 08:11

## 2025-02-21 RX ADMIN — ALBUMIN HUMAN 250 ML: 0.05 INJECTION, SOLUTION INTRAVENOUS at 15:58

## 2025-02-21 RX ADMIN — HYDROMORPHONE HYDROCHLORIDE 0.4 MG: 1 INJECTION, SOLUTION INTRAMUSCULAR; INTRAVENOUS; SUBCUTANEOUS at 20:12

## 2025-02-21 RX ADMIN — CEFAZOLIN 2 G: 1 INJECTION, POWDER, FOR SOLUTION INTRAMUSCULAR; INTRAVENOUS at 16:10

## 2025-02-21 RX ADMIN — HEPARIN SODIUM 5000 UNITS: 5000 INJECTION, SOLUTION INTRAVENOUS; SUBCUTANEOUS at 07:12

## 2025-02-21 RX ADMIN — GLYCOPYRROLATE 0.2 MG: 0.2 INJECTION INTRAMUSCULAR; INTRAVENOUS at 15:47

## 2025-02-21 RX ADMIN — SODIUM CHLORIDE, SODIUM LACTATE, POTASSIUM CHLORIDE, AND CALCIUM CHLORIDE: 600; 310; 30; 20 INJECTION, SOLUTION INTRAVENOUS at 07:14

## 2025-02-21 RX ADMIN — DOCUSATE SODIUM 100 MG: 100 CAPSULE, LIQUID FILLED ORAL at 22:25

## 2025-02-21 RX ADMIN — CEFAZOLIN 2 G: 1 INJECTION, POWDER, FOR SOLUTION INTRAMUSCULAR; INTRAVENOUS at 12:10

## 2025-02-21 RX ADMIN — PROPOFOL 140 MG: 10 INJECTION, EMULSION INTRAVENOUS at 07:59

## 2025-02-21 RX ADMIN — PROPOFOL 20 MCG/KG/MIN: 10 INJECTION, EMULSION INTRAVENOUS at 08:38

## 2025-02-21 RX ADMIN — PROPOFOL 20 MG: 10 INJECTION, EMULSION INTRAVENOUS at 18:01

## 2025-02-21 RX ADMIN — ROCURONIUM 20 MG: 50 INJECTION, SOLUTION INTRAVENOUS at 11:03

## 2025-02-21 RX ADMIN — VECURONIUM BROMIDE 1 MG: 10 INJECTION, POWDER, LYOPHILIZED, FOR SOLUTION INTRAVENOUS at 15:54

## 2025-02-21 RX ADMIN — ROCURONIUM 20 MG: 50 INJECTION, SOLUTION INTRAVENOUS at 09:46

## 2025-02-21 RX ADMIN — Medication 120 MCG: at 15:46

## 2025-02-21 RX ADMIN — ROCURONIUM 10 MG: 50 INJECTION, SOLUTION INTRAVENOUS at 13:14

## 2025-02-21 RX ADMIN — INDOCYANINE GREEN AND WATER 7.5 MG: KIT at 13:29

## 2025-02-21 RX ADMIN — ROCURONIUM 20 MG: 50 INJECTION, SOLUTION INTRAVENOUS at 12:10

## 2025-02-21 RX ADMIN — ROCURONIUM 10 MG: 50 INJECTION, SOLUTION INTRAVENOUS at 13:10

## 2025-02-21 RX ADMIN — ROCURONIUM 20 MG: 50 INJECTION, SOLUTION INTRAVENOUS at 11:33

## 2025-02-21 RX ADMIN — METHOCARBAMOL 500 MG: 500 TABLET ORAL at 23:38

## 2025-02-21 RX ADMIN — HYDROMORPHONE HYDROCHLORIDE 0.5 MG: 1 INJECTION, SOLUTION INTRAMUSCULAR; INTRAVENOUS; SUBCUTANEOUS at 17:01

## 2025-02-21 RX ADMIN — FENTANYL CITRATE 100 MCG: 50 INJECTION, SOLUTION INTRAMUSCULAR; INTRAVENOUS at 07:59

## 2025-02-21 RX ADMIN — Medication 120 MCG: at 17:44

## 2025-02-21 RX ADMIN — CEFAZOLIN SODIUM 2 G: 2 INJECTION, SOLUTION INTRAVENOUS at 23:37

## 2025-02-21 RX ADMIN — VECURONIUM BROMIDE 2 MG: 10 INJECTION, POWDER, LYOPHILIZED, FOR SOLUTION INTRAVENOUS at 14:14

## 2025-02-21 RX ADMIN — ROCURONIUM 20 MG: 50 INJECTION, SOLUTION INTRAVENOUS at 10:42

## 2025-02-21 RX ADMIN — ROCURONIUM 20 MG: 50 INJECTION, SOLUTION INTRAVENOUS at 10:29

## 2025-02-21 RX ADMIN — Medication 40 MCG: at 10:25

## 2025-02-21 RX ADMIN — ACETAMINOPHEN 650 MG: 325 TABLET ORAL at 23:45

## 2025-02-21 RX ADMIN — SODIUM CHLORIDE 100 ML/HR: 9 INJECTION, SOLUTION INTRAVENOUS at 22:25

## 2025-02-21 SDOH — SOCIAL STABILITY: SOCIAL INSECURITY: DO YOU FEEL UNSAFE GOING BACK TO THE PLACE WHERE YOU ARE LIVING?: NO

## 2025-02-21 SDOH — SOCIAL STABILITY: SOCIAL INSECURITY
ASK PARENT OR GUARDIAN: ARE THERE TIMES WHEN YOU, YOUR CHILD(REN), OR ANY MEMBER OF YOUR HOUSEHOLD FEEL UNSAFE, HARMED, OR THREATENED AROUND PERSONS WITH WHOM YOU KNOW OR LIVE?: NO

## 2025-02-21 SDOH — SOCIAL STABILITY: SOCIAL INSECURITY: HAVE YOU HAD ANY THOUGHTS OF HARMING ANYONE ELSE?: NO

## 2025-02-21 SDOH — SOCIAL STABILITY: SOCIAL INSECURITY: ARE YOU OR HAVE YOU BEEN THREATENED OR ABUSED PHYSICALLY, EMOTIONALLY, OR SEXUALLY BY ANYONE?: NO

## 2025-02-21 SDOH — SOCIAL STABILITY: SOCIAL INSECURITY: ARE THERE ANY APPARENT SIGNS OF INJURIES/BEHAVIORS THAT COULD BE RELATED TO ABUSE/NEGLECT?: NO

## 2025-02-21 SDOH — ECONOMIC STABILITY: HOUSING INSECURITY: AT ANY TIME IN THE PAST 12 MONTHS, WERE YOU HOMELESS OR LIVING IN A SHELTER (INCLUDING NOW)?: NO

## 2025-02-21 SDOH — SOCIAL STABILITY: SOCIAL INSECURITY: WITHIN THE LAST YEAR, HAVE YOU BEEN AFRAID OF YOUR PARTNER OR EX-PARTNER?: NO

## 2025-02-21 SDOH — SOCIAL STABILITY: SOCIAL INSECURITY: HAS ANYONE EVER THREATENED TO HURT YOUR FAMILY OR YOUR PETS?: NO

## 2025-02-21 SDOH — SOCIAL STABILITY: SOCIAL INSECURITY: ABUSE: ADULT

## 2025-02-21 SDOH — ECONOMIC STABILITY: TRANSPORTATION INSECURITY: IN THE PAST 12 MONTHS, HAS LACK OF TRANSPORTATION KEPT YOU FROM MEDICAL APPOINTMENTS OR FROM GETTING MEDICATIONS?: NO

## 2025-02-21 SDOH — ECONOMIC STABILITY: FOOD INSECURITY: WITHIN THE PAST 12 MONTHS, THE FOOD YOU BOUGHT JUST DIDN'T LAST AND YOU DIDN'T HAVE MONEY TO GET MORE.: NEVER TRUE

## 2025-02-21 SDOH — SOCIAL STABILITY: SOCIAL INSECURITY: WITHIN THE LAST YEAR, HAVE YOU BEEN HUMILIATED OR EMOTIONALLY ABUSED IN OTHER WAYS BY YOUR PARTNER OR EX-PARTNER?: NO

## 2025-02-21 SDOH — SOCIAL STABILITY: SOCIAL INSECURITY
WITHIN THE LAST YEAR, HAVE YOU BEEN KICKED, HIT, SLAPPED, OR OTHERWISE PHYSICALLY HURT BY YOUR PARTNER OR EX-PARTNER?: NO

## 2025-02-21 SDOH — ECONOMIC STABILITY: INCOME INSECURITY: IN THE PAST 12 MONTHS HAS THE ELECTRIC, GAS, OIL, OR WATER COMPANY THREATENED TO SHUT OFF SERVICES IN YOUR HOME?: NO

## 2025-02-21 SDOH — SOCIAL STABILITY: SOCIAL INSECURITY
WITHIN THE LAST YEAR, HAVE YOU BEEN RAPED OR FORCED TO HAVE ANY KIND OF SEXUAL ACTIVITY BY YOUR PARTNER OR EX-PARTNER?: NO

## 2025-02-21 SDOH — ECONOMIC STABILITY: FOOD INSECURITY: WITHIN THE PAST 12 MONTHS, YOU WORRIED THAT YOUR FOOD WOULD RUN OUT BEFORE YOU GOT THE MONEY TO BUY MORE.: NEVER TRUE

## 2025-02-21 SDOH — ECONOMIC STABILITY: HOUSING INSECURITY: DO YOU FEEL UNSAFE GOING BACK TO THE PLACE WHERE YOU LIVE?: NO

## 2025-02-21 SDOH — ECONOMIC STABILITY: FOOD INSECURITY: HOW HARD IS IT FOR YOU TO PAY FOR THE VERY BASICS LIKE FOOD, HOUSING, MEDICAL CARE, AND HEATING?: NOT HARD AT ALL

## 2025-02-21 SDOH — ECONOMIC STABILITY: HOUSING INSECURITY: IN THE LAST 12 MONTHS, WAS THERE A TIME WHEN YOU WERE NOT ABLE TO PAY THE MORTGAGE OR RENT ON TIME?: NO

## 2025-02-21 SDOH — SOCIAL STABILITY: SOCIAL INSECURITY: HAVE YOU HAD THOUGHTS OF HARMING ANYONE ELSE?: NO

## 2025-02-21 SDOH — SOCIAL STABILITY: SOCIAL INSECURITY: DO YOU FEEL ANYONE HAS EXPLOITED OR TAKEN ADVANTAGE OF YOU FINANCIALLY OR OF YOUR PERSONAL PROPERTY?: NO

## 2025-02-21 SDOH — ECONOMIC STABILITY: HOUSING INSECURITY: IN THE PAST 12 MONTHS, HOW MANY TIMES HAVE YOU MOVED WHERE YOU WERE LIVING?: 0

## 2025-02-21 SDOH — SOCIAL STABILITY: SOCIAL INSECURITY: DOES ANYONE TRY TO KEEP YOU FROM HAVING/CONTACTING OTHER FRIENDS OR DOING THINGS OUTSIDE YOUR HOME?: NO

## 2025-02-21 ASSESSMENT — COGNITIVE AND FUNCTIONAL STATUS - GENERAL
PERSONAL GROOMING: A LITTLE
WALKING IN HOSPITAL ROOM: A LITTLE
TOILETING: A LITTLE
TURNING FROM BACK TO SIDE WHILE IN FLAT BAD: A LITTLE
MOVING FROM LYING ON BACK TO SITTING ON SIDE OF FLAT BED WITH BEDRAILS: A LITTLE
HELP NEEDED FOR BATHING: A LITTLE
EATING MEALS: A LITTLE
DAILY ACTIVITIY SCORE: 18
MOVING TO AND FROM BED TO CHAIR: A LITTLE
CLIMB 3 TO 5 STEPS WITH RAILING: A LITTLE
MOBILITY SCORE: 18
DRESSING REGULAR UPPER BODY CLOTHING: A LITTLE
STANDING UP FROM CHAIR USING ARMS: A LITTLE
DRESSING REGULAR LOWER BODY CLOTHING: A LITTLE
PATIENT BASELINE BEDBOUND: NO

## 2025-02-21 ASSESSMENT — PAIN - FUNCTIONAL ASSESSMENT
PAIN_FUNCTIONAL_ASSESSMENT: 0-10
PAIN_FUNCTIONAL_ASSESSMENT: UNABLE TO SELF-REPORT
PAIN_FUNCTIONAL_ASSESSMENT: UNABLE TO SELF-REPORT
PAIN_FUNCTIONAL_ASSESSMENT: 0-10
PAIN_FUNCTIONAL_ASSESSMENT: UNABLE TO SELF-REPORT
PAIN_FUNCTIONAL_ASSESSMENT: 0-10
PAIN_FUNCTIONAL_ASSESSMENT: 0-10

## 2025-02-21 ASSESSMENT — ACTIVITIES OF DAILY LIVING (ADL)
ASSISTIVE_DEVICE: EYEGLASSES
JUDGMENT_ADEQUATE_SAFELY_COMPLETE_DAILY_ACTIVITIES: YES
WALKS IN HOME: INDEPENDENT
LACK_OF_TRANSPORTATION: NO
HEARING - LEFT EAR: FUNCTIONAL
BATHING: INDEPENDENT
PATIENT'S MEMORY ADEQUATE TO SAFELY COMPLETE DAILY ACTIVITIES?: YES
TOILETING: INDEPENDENT
ADEQUATE_TO_COMPLETE_ADL: YES
LACK_OF_TRANSPORTATION: NO
FEEDING YOURSELF: INDEPENDENT
HEARING - RIGHT EAR: FUNCTIONAL
GROOMING: INDEPENDENT
DRESSING YOURSELF: INDEPENDENT

## 2025-02-21 ASSESSMENT — PATIENT HEALTH QUESTIONNAIRE - PHQ9
SUM OF ALL RESPONSES TO PHQ9 QUESTIONS 1 & 2: 0
1. LITTLE INTEREST OR PLEASURE IN DOING THINGS: NOT AT ALL
2. FEELING DOWN, DEPRESSED OR HOPELESS: NOT AT ALL

## 2025-02-21 ASSESSMENT — PAIN SCALES - GENERAL
PAINLEVEL_OUTOF10: 3
PAINLEVEL_OUTOF10: 0 - NO PAIN
PAINLEVEL_OUTOF10: 2
PAINLEVEL_OUTOF10: 2
PAINLEVEL_OUTOF10: 6
PAINLEVEL_OUTOF10: 2

## 2025-02-21 ASSESSMENT — LIFESTYLE VARIABLES
AUDIT-C TOTAL SCORE: 2
HOW MANY STANDARD DRINKS CONTAINING ALCOHOL DO YOU HAVE ON A TYPICAL DAY: 1 OR 2
SKIP TO QUESTIONS 9-10: 1
AUDIT-C TOTAL SCORE: 2
HOW OFTEN DO YOU HAVE A DRINK CONTAINING ALCOHOL: 2-4 TIMES A MONTH
HOW OFTEN DO YOU HAVE 6 OR MORE DRINKS ON ONE OCCASION: NEVER

## 2025-02-21 ASSESSMENT — COLUMBIA-SUICIDE SEVERITY RATING SCALE - C-SSRS
6. HAVE YOU EVER DONE ANYTHING, STARTED TO DO ANYTHING, OR PREPARED TO DO ANYTHING TO END YOUR LIFE?: NO
2. HAVE YOU ACTUALLY HAD ANY THOUGHTS OF KILLING YOURSELF?: NO
1. IN THE PAST MONTH, HAVE YOU WISHED YOU WERE DEAD OR WISHED YOU COULD GO TO SLEEP AND NOT WAKE UP?: NO

## 2025-02-21 NOTE — H&P
Patient ID: Ashley Jiang is a 59 y.o. female.     History of Present Illness: Recent diagnosis of left breast DCIS proven biopsy, denies any other medical issues. Non smoker.     Review of Systems  ROS: All 10 systems were reviewed and are unremarkable except for those mentioned in HPI.         Objective[]Expand by Default  :  Physical Exam  Vitals and nursing note reviewed. Exam conducted with a chaperone present.   Constitutional:       General: She is not in acute distress.     Appearance: She is not ill-appearing.   Eyes:      Extraocular Movements: Extraocular movements intact.      Conjunctiva/sclera: Conjunctivae normal.      Pupils: Pupils are equal, round, and reactive to light.   Cardiovascular:      Rate and Rhythm: Normal rate and regular rhythm.      Pulses: Normal pulses.   Pulmonary:      Effort: Pulmonary effort is normal.      Breath sounds: Normal breath sounds.   Abdominal:      Palpations: Abdomen is soft. There is no mass.      Tenderness: There is no abdominal tenderness.      Hernia: No hernia is present.   Musculoskeletal:         General: No swelling or tenderness.      Cervical back: Normal range of motion and neck supple.   Skin:     Capillary Refill: Capillary refill takes less than 2 seconds.      Coloration: Skin is not jaundiced.      Findings: No bruising or rash.   Neurological:      General: No focal deficit present.      Mental Status: She is oriented to person, place, and time.   Psychiatric:         Mood and Affect: Mood normal.         Behavior: Behavior normal.         Thought Content: Thought content normal.         Judgment: Judgment normal.   Focused exam on breasts:  Left breast: Well healed biopsy sites (x3) with no discrete mass. Grade III ptosis. Normal breast skin. No nipple inversion. No nipple discharge.  Right breast: Well healed biopsy site (x1) with no discrete mass. Grade III ptosis. Normal breast skin. No nipple inversion. No nipple discharge.  Focused exam on  abdomen: Soft and pliable, no hernia, and no wounds in the TAMARA territory bilaterally, small-sized pannus.      Assessment/Plan  :     I had the pleasure of meeting with Ashley and her  today at clinic. Ashley is diagnosed with left breast DCIS with large area of abnormality. Right breast is cancer free based on the site biopsied. Patient is interested in breast reconstruction options particularly using her own tissue.      I reviewed with patient and her partner autologous breast reconstruction with focus on indications, contraindications, alternatives and possible complications. I also reviewed commonly utilized donor sites such as the abdomen, thighs, and gluteal regions. Pros and cons were discussed thoroughly. With patient's permission, a TAMARA flap was marked on the abdomen and it was illustrated to her how the flap can be used to reconstruction the breast. Based on patient's BMI and current abdominal pannus size, the patient will need zone I of each TAMARA plus Zone II of the contralateral TAMARA to fill the left breast after mastectomy. Patient understood that TAMARA flap reconstruction is the gold standard; however, TAMARA variant is sometimes indicated such as the muscle sparing TRAM or even the TRAM flap based on the anatomy of the abdominal perforators.  CTA of abdomen and pelvis will be required and review of the results is to be expected.      We also discussed nipple sparing vs. Excision. Pros and cons were reviewed from reconstruction standpoint. We then discussed incisions for the left mastectomy and figure of 8 vs. IMF vs inverted T were marked and discussed with patient. Figure of 8 is warranted with planned skin sparing mastectomy.      We then discussed perioperative course, including administration of  blood thinners and blood transfusion, and falp moniotring techniques includign Doppler exam and continuous remote monitorign with ViOptix, and then we discussed recovery and rehabilitation.       Plan:  -Left Breast Mastectomy with Immediate reconstruction via TAMARA abdominal muscle flap

## 2025-02-21 NOTE — OP NOTE
right Magseed excisional biopsy (R), left skin sparing mastectomy (L), left intraoperative lymphatic mapping, axillary sentinel lymph node biopsy (L) Operative Note     Date: 2025  OR Location: [facility] OR    Name: Ashley Jiang, : 1965, Age: 59 y.o., MRN: [id number], Sex: female    Diagnosis  Pre-op Diagnosis      * Ductal carcinoma in situ (DCIS) of left breast [D05.12]     * Atypical ductal hyperplasia of right breast [N60.91] Post-op Diagnosis     * Ductal carcinoma in situ (DCIS) of left breast [D05.12]     * Atypical ductal hyperplasia of right breast [N60.91]     Procedures  right Magseed excisional biopsy  81737 - [place] EXC BREAST LES PREOP PLMT RAD MARKER OPEN 1 LES    left skin or nipple sparing mastectomy  82268 - [place] MASTECTOMY SIMPLE COMPLETE    left intraoperative lymphatic mapping, axillary sentinel lymph node biopsy  71672 - [place] BX/EXC LYMPH NODE OPEN DEEP AXILLARY NODE        Surgeons   Panel 1:     * Charlette Barnett - Primary  Panel 2:     * Emilie Reyes - Primary    Resident/Fellow/Other Assistant:  Surgeons and Role:  * No surgeons found with a matching role *    Staff:   RNFA:   Circulator: Gianna  Circulator: Vignesh Thomas Person: Sarika Connollyub Person: Bing Connollyub Person: Bhavesh Redman Circulator: Concepcion    Anesthesia Staff: Anesthesiologist: Bharati Lopez MD MPH  CRNA: TYRONE Maldonado-CRNA  C-AA: CARLOS Kimbrough; CARLOS Martin  Frontline Breaker: CARLOS Kimbrough    Procedure Summary  Anesthesia: General  ASA: II  Estimated Blood Loss: 10mL  Intra-op Medications: lymphazurin           Anesthesia Record               Intraprocedure I/O Totals       None           Specimen:   ID Type Source Tests Collected by Time   1 : RIGHT MAGSEED EXCISIONAL BIOPSY AT 9:00 3 CM FN, SHORT STITCH SUPERIOR, LONG STITCH LATERAL Tissue BREAST LUMPECTOMY RIGHT SURGICAL PATHOLOGY EXAM Charlette Barnett MD 2025 0846   2 : LEFT SKIN SPARING MASTECTOMY, STITCH AT 12:00 Tissue  BREAST MASTECTOMY LEFT SURGICAL PATHOLOGY EXAM Charlette Barnett MD 2/21/2025 0932   3 : LEFT BREAST SENTINEL LYMPH NODES Tissue SENTINEL LYMPH NODE BREAST LEFT SURGICAL PATHOLOGY EXAM Charlette Barnett MD 2/21/2025 0935        Drains: none by me      Findings: specimen radiograph confirmed removal of right breast Magseed    Indications: Ashley Jiang is an 59 y.o. female who is having surgery for Ductal carcinoma in situ (DCIS) of left breast [D05.12]  Atypical ductal hyperplasia of right breast [N60.91].     The patient was seen in the preoperative area. The risks, benefits, complications, treatment options, non-operative alternatives, expected recovery and outcomes were discussed with the patient. The possibilities of reaction to medication, pulmonary aspiration, injury to surrounding structures, bleeding, recurrent infection, the need for additional procedures, failure to diagnose a condition, and creating a complication requiring transfusion or operation were discussed with the patient. The patient concurred with the proposed plan, giving informed consent.  The site of surgery was properly noted/marked if necessary per policy. The patient has been actively warmed in preoperative area. Preoperative antibiotics have been ordered and given within 1 hours of incision. Venous thrombosis prophylaxis have been ordered including bilateral sequential compression devices and chemical prophylaxis    Procedure Details:   After informed consent was obtained and the appropriate breast was marked the patient was transferred to the operating room.  She was positioned on the operating room table in a supine position with her pressure points padded appropriately. A surgical time out was performed with the OR team.  General anesthesia was induced and she received perioperative antibiotics in the form of Ancef.    I proceeded with intraoperative lymphatic mapping by injecting her left breast around the border of the areola transdermally  "from 12-3 o'clock with 1.0 millicurie of technetium-99 tilmanocept (Lymphoseek). I then injected her left breast subareolar with 2.5 mL of Lymphazurin blue dye.  Her left breast was gently massaged for 5 minutes.    I reviewed the preoperative imaging and confirmed that she had 1 MagSeed in her right breast at the 9 o'clock position localizing the biopsy clip.  Her bilateral breast, axilla, and abdomen were prepped and draped in a sterile fashion. Dr. Reyes was present throughout the entire procedure working on the abdomen, please see his dictated portion of the procedure. Using the probe, I was able to confirm the location of the MagSeed in the right breast.  I made a radial incision. Dissection was taken down through her soft tissues using electrocautery.  A skin flap was raised superiorly and inferiorly and I excised a 3x3 cm square of breast tissue that contained the MagSeed.  I confirmed the seed was within the excision specimen using the probe. The specimen was oriented with suture and sent to Pathology fresh labeled \"right Magseed lumpectomy at 9:00 3cm FN, short stitch superior, long stitch lateral.\"    I reviewed the specimen radiograph myself.  I confirmed the excision specimen contained the MagSeed. The biopsy clip was not visualized in the specimen, but the excision was generous and the Magseed was positioned centrally within the specimen, so we presume the biopsy clip was knocked out with the manipulation of the tissue.    The posterior margin of this excision was above pectoralis fascia.  The cavity was irrigated.  Hemostasis was achieved with electrocautery and clips.  Six small clips were left marking the boundaries of the cavity.    Local advancement flaps were raised over a distance 3x3 cm.  The cavity was closed in layers using multiple interrupted 3-0 Vicryls. The skin was closed using interrupted 3-0 Vicryl deep dermals and a running subcuticular 4-0 Monocryl.      Using the Neoprobe, I was " "able to confirm there was uptake of technetium in the left axilla. I then turned my attention to the left skin-sparing mastectomy.  Tumescent solution was injected in the space between the subcutaneous fat and the breast tissue in all directions and given time to work.  The entire skin incision was created with a scalpel and the skin flaps were raised sharply, using scissors, from the lateral border of the sternum medially, to clavicle superiorly, to the anterior border of latissimus laterally, to the inframammary fold inferiorly.  Once all the skin flaps have been raised, the breast was removed from the underlying pectoralis muscle, to include pectoralis fascia, using electrocautery.  Once the breast was completely excised, it was marked with a stitch at 12 o'clock and sent to Pathology fresh labeled \"left skin-sparing mastectomy, stitch at 12 o'clock.\"    I then turned my attention to the intraoperative lymphatic mapping and sentinel node biopsy. I was able to access the axilla through the mastectomy incision.  The clavipectoral fascia was divided during the mastectomy.  I identified a hot spot up against the chest wall in level I.  This tissue was grasped, elevated and excised.  This was associated with a lymph node that had uptake of blue dye and technetium.  Ex Vivo count of 349. I identified 1 other lymph node that had uptake of technetium that had ex Vivo counts lower than this.  They were sent together as \"left axillary sentinel lymph nodes.\" All specimens were sent for permanent.    At the end of my portion of the case, the patient was tolerating general anesthesia without incident and the instrument and sponge counts were correct.    The procedure was made technically difficult due to skin sparing nature of the mastectomy.     Complications:  None; patient tolerated the procedure well.    Disposition:  stable, intubated in the OR for Dr. Reyes's portion of the procedure  Condition: stable     [place] " Node Biopsy for Breast Cancer - Left  Operation performed with curative intent Yes   Tracer(s) used to identify sentinel nodes in the upfront surgery (non-neoadjuvant) setting Dye and Radioactive tracer   Tracer(s) used to identify sentinel nodes in the neoadjuvant setting N/A   All nodes (colored or non-colored) present at the end of a dye-filled lymphatic channel were removed Yes   All significantly radioactive nodes were removed Yes   All palpably suspicious nodes were removed N/A   Biopsy-proven positive nodes marked with clips prior to chemotherapy were identified and removed N/A       Attending Attestation: I was present and scrubbed for the entire procedure.    Charlette Barnett  Phone Number: [phone]

## 2025-02-21 NOTE — ANESTHESIA PROCEDURE NOTES
Peripheral IV  Date/Time: 2/21/2025 8:16 AM  Inserted by: Milka Heart    Placement  Needle size: 18 G  Laterality: right  Location: hand  Local anesthetic: none  Site prep: alcohol  Technique: anatomical landmarks  Attempts: 2

## 2025-02-21 NOTE — ANESTHESIA PROCEDURE NOTES
Airway  Date/Time: 2/21/2025 8:04 AM  Urgency: elective    Airway not difficult    Staffing  Performed: PAULINE   Authorized by: Bharati Lopez MD MPH    Performed by: CARLOS Martin  Patient location during procedure: OR    Indications and Patient Condition  Indications for airway management: anesthesia and airway protection  Spontaneous Ventilation: absent  Sedation level: deep  Preoxygenated: yes  Patient position: sniffing  Mask difficulty assessment: 1 - vent by mask    Final Airway Details  Final airway type: endotracheal airway      Successful airway: ETT  Cuffed: yes   Successful intubation technique: direct laryngoscopy  Facilitating devices/methods: intubating stylet  Endotracheal tube insertion site: oral  Blade: Landy  Blade size: #3  ETT size (mm): 7.0  Cormack-Lehane Classification: grade I - full view of glottis  Placement verified by: capnometry   Measured from: teeth  ETT to teeth (cm): 20  Number of attempts at approach: 1

## 2025-02-21 NOTE — ANESTHESIA PREPROCEDURE EVALUATION
Patient: Ashley Jiang    Procedure Information       Anesthesia Start Date/Time: 02/21/25 0714    Procedures:       right Magseed excisional biopsy (Right)      left skin or nipple sparing mastectomy (Left)      left intraoperative lymphatic mapping, axillary sentinel lymph node biopsy (Left)      RECONSTRUCTION, BREAST, USING TAMARA SKIN FLAP (Left)    Location: [facility] OR 06 / [facility] OR    Surgeons: Charlette Barnett MD; Emilie Reyes MD            Relevant Problems   Anesthesia (within normal limits)      Cardiac   (+) Hyperlipidemia, mild      Pulmonary (within normal limits)      Neuro   (+) Major depression in remission (CMS-HCC)   (+) Seasonal affective disorder (CMS-HCC)      GYN   (+) Breast cancer (Multi)       Clinical information reviewed:   Tobacco  Allergies  Meds   Med Hx  Surg Hx  OB Status  Fam Hx  Soc   Hx        NPO Detail:  NPO/Void Status  Carbohydrate Drink Given Prior to Surgery? : N  Date of Last Liquid: 02/20/25  Time of Last Liquid: 0000  Date of Last Solid: 02/20/25  Time of Last Solid: 0000  Last Intake Type: Clear fluids  Time of Last Void: 0536         Vitals:    02/21/25 0547   BP: 132/67   Pulse: 87   Resp: 16   Temp: 36 °C (96.8 °F)   SpO2: 100%       Physical Exam    Airway  Mallampati: II     Cardiovascular - normal exam     Dental - normal exam     Pulmonary - normal exam     Abdominal        Anesthesia Plan    History of general anesthesia?: yes  History of complications of general anesthesia?: no    ASA 2     general   (Preop VÍCTOR catheter on right.  Bilateral paravertebral single shot blocks.  By Acute Pain Team)  Anesthetic plan and risks discussed with patient.    Plan discussed with CAA and attending.

## 2025-02-21 NOTE — CONSULTS
Ashley Jiang is a 59 y.o. year old female patient who presents for Procedure(s):  right Magseed excisional biopsy  left skin or nipple sparing mastectomy  left intraoperative lymphatic mapping, axillary sentinel lymph node biopsy  RECONSTRUCTION, BREAST, USING TAMARA SKIN FLAP with Charlette Barnett MD on 2/21/2025.  Acute Pain consulted for assistance with pain control.     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 No pertinent surgical history.     No family history on file.     Social History     Socioeconomic History    Marital status:      Spouse name: Not on file    Number of children: Not on file    Years of education: Not on file    Highest education level: Not on file   Occupational History    Not on file   Tobacco Use    Smoking status: Never     Passive exposure: Never    Smokeless tobacco: Never   Substance and Sexual Activity    Alcohol use: Not on file    Drug use: Not on file    Sexual activity: Not on file   Other Topics Concern    Not on file   Social History Narrative    Not on file     Social Drivers of Health     Financial Resource Strain: Low Risk  (11/10/2020)    Received from [facility]    Overall Financial Resource Strain (CARDIA)     Difficulty of Paying Living Expenses: Not hard at all   Food Insecurity: No Food Insecurity (11/10/2020)    Received from [facility]    Hunger Vital Sign     Worried About Running Out of Food in the Last Year: Never true     Ran Out of Food in the Last  Year: Never true   Transportation Needs: No Transportation Needs (11/10/2020)    Received from [facility]    PRAPARE - Transportation     Lack of Transportation (Medical): No     Lack of Transportation (Non-Medical): No   Physical Activity: Sufficiently Active (11/12/2021)    Received from [facility]    Exercise Vital Sign     Days of Exercise per Week: 3 days     Minutes of Exercise per Session: 60 min   Stress: No Stress Concern Present (11/10/2020)    Received from [facility]    [employer] [place] [employer] - Occupational Stress Questionnaire     Feeling of Stress : Not at all   Social Connections: Moderately Isolated (11/10/2020)    Received from [facility]    Social Connection and Isolation Panel [NHANES]     Frequency of Communication with Friends and Family: More than three times a week     Frequency of Social Gatherings with Friends and Family: More than three times a week     Attends Protestant Services: Never     Active Member of Clubs or Organizations: No     Attends Club or Organization Meetings: Never     Marital Status:    Intimate Partner Violence: Not on file   Housing Stability: Low Risk  (11/12/2021)    Received from [facility]    Housing Stability Vital Sign     Unable to Pay for Housing in the Last Year: No     Number of Places Lived in the Last Year: 1     Unstable Housing in the Last Year: No        No Known Allergies      Review of Systems  Gen: No fatigue, anorexia, insomnia, fever.   Eyes: No vision loss, double vision, drainage, eye pain.   ENT: No pharyngitis, dry mouth, no hearing changes or ear discharge  Cardiac: No chest pain, palpitations, syncope, near syncope.   Pulmonary: No shortness of breath, cough, hemoptysis.   Heme/lymph: No swollen glands, fever, bleeding.   GI: No abdominal pain, change in bowel habits, melena, hematemesis, hematochezia,  nausea, vomiting, diarrhea.   : No discharge, dysuria, frequency, urgency, hematuria.  Endo: No polyuria or weight loss.   Musculoskeletal: Negative for any pain or loss of ROM/weakness  Skin: No rashes or lesions  Neuro: Normal speech, no numbness or weakness. No gait difficulties  Review of systems is otherwise negative unless stated above or in history of present illness.    Physical Exam:  Constitutional:  no distress, alert and cooperative  Eyes: clear sclera  Head/Neck: No apparent injury, trachea midline  Respiratory/Thorax: Patent airways, thorax symmetric, breathing comfortably  Cardiovascular: no pitting edema  Gastrointestinal: Nondistended  Musculoskeletal: ROM intact  Extremities: no clubbing  Neurological: alert, louis x4  Psychological: Appropriate affect    No results found for this or any previous visit (from the past 24 hours).     Ashley Jiang is a 59 y.o. year old female patient who presents for Procedure(s):  right Magseed excisional biopsy  left skin or nipple sparing mastectomy  left intraoperative lymphatic mapping, axillary sentinel lymph node biopsy  RECONSTRUCTION, BREAST, USING TAMARA SKIN FLAP with Charlette Barnett MD on 2/21/2025. Acute Pain consulted for assistance with pain control.     Plan:    - Left erector spinae block with catheter (T6) performed pre-operatively on 2/21/2025  - Bl paravertebral single shot nerve blocks performed at T9, T10, T11  - Ambit ball with Ropivacaine 0.2%/NaCl 0.9% 500mL, Rate 10 cc/hr unilaterally  - Ambit medication will not interfere with pain medication prescribed by the primary team.   - Please be aware of local anesthetic toxic dose and absorption variability before considering lidocaine patches  - Acute pain service will follow while catheters in place  - Rest of pain management per primary team    Acute Pain Resident  pg [phone] ph [phone]

## 2025-02-21 NOTE — ANESTHESIA PROCEDURE NOTES
Peripheral Block    Patient location during procedure: pre-op  Start time: 2/21/2025 7:04 AM  End time: 2/21/2025 7:23 AM  Reason for block: at surgeon's request and post-op pain management  Staffing  Performed: resident and attending   Authorized by: Nani Mckeon MD    Performed by: Donte Real MD  Preanesthetic Checklist  Completed: patient identified, IV checked, site marked, risks and benefits discussed, surgical consent, monitors and equipment checked, pre-op evaluation and timeout performed   Timeout performed at: 2/21/2025 7:04 AM  Peripheral Block  Patient position: sitting  Prep: ChloraPrep  Patient monitoring: heart rate and continuous pulse ox  Block type: VÍCTOR  Laterality: left  Injection technique: catheter  Guidance: ultrasound guided  Local infiltration: lidocaine  Infiltration strength: 1 %  Dose: 3 mL  Needle  Needle type: Tuohy   Needle gauge: 22 G  Needle length: 8 cm  Needle localization: ultrasound guidance     image stored in chart  Assessment  Injection assessment: negative aspiration for heme, no paresthesia on injection, incremental injection and local visualized surrounding nerve on ultrasound  Additional Notes  Erector spinae plane block:     Prior to procedure: Following a focused history, procedure-related and patient-specific complications were discussed. Risks, benefits, and alternatives were explained. Informed, written consent was provided by the patient and/or surrogate decision maker for the block. Anticoagulation (if any) was held per GLEN guidelines. ASA monitors were applied. Patient was positioned, prepped with chlorhexidine, and draped with sterile towels.     Ultrasound guidance was used to visualize the erector spinae muscle above the TP/costal junction at T6. Skin was numbed with 1% lidocaine. Needle was inserted and advanced towards target with visualization of the needle throughout duration of the procedure. A total of Type of Local: 5 cc of 0.5% ropivacaine, was  injected unilaterally. Catheters threaded and secured. Patient tolerated procedure well.    Bl paravertebral SS nerve blocks were performed at T9, T10, T11, 5 ml 0.5% ropivacaine injected on each side at each level.    Timeout by Ronda PETERSON

## 2025-02-22 LAB
ABO GROUP (TYPE) IN BLOOD: NORMAL
ANION GAP SERPL CALC-SCNC: 11 MMOL/L (ref 10–20)
ANTIBODY SCREEN: NORMAL
BASOPHILS # BLD AUTO: 0.02 X10*3/UL (ref 0–0.1)
BASOPHILS NFR BLD AUTO: 0.2 %
BUN SERPL-MCNC: 7 MG/DL (ref 6–23)
CALCIUM SERPL-MCNC: 8.8 MG/DL (ref 8.6–10.6)
CHLORIDE SERPL-SCNC: 107 MMOL/L (ref 98–107)
CO2 SERPL-SCNC: 28 MMOL/L (ref 21–32)
CREAT SERPL-MCNC: 0.7 MG/DL (ref 0.5–1.05)
EGFRCR SERPLBLD CKD-EPI 2021: >90 ML/MIN/1.73M*2
EOSINOPHIL # BLD AUTO: 0.01 X10*3/UL (ref 0–0.7)
EOSINOPHIL NFR BLD AUTO: 0.1 %
ERYTHROCYTE [DISTWIDTH] IN BLOOD BY AUTOMATED COUNT: 12 % (ref 11.5–14.5)
GLUCOSE SERPL-MCNC: 103 MG/DL (ref 74–99)
HCT VFR BLD AUTO: 30.4 % (ref 36–46)
HGB BLD-MCNC: 9.7 G/DL (ref 12–16)
IMM GRANULOCYTES # BLD AUTO: 0.03 X10*3/UL (ref 0–0.7)
IMM GRANULOCYTES NFR BLD AUTO: 0.4 % (ref 0–0.9)
LYMPHOCYTES # BLD AUTO: 1.28 X10*3/UL (ref 1.2–4.8)
LYMPHOCYTES NFR BLD AUTO: 15 %
MCH RBC QN AUTO: 30.2 PG (ref 26–34)
MCHC RBC AUTO-ENTMCNC: 31.9 G/DL (ref 32–36)
MCV RBC AUTO: 95 FL (ref 80–100)
MONOCYTES # BLD AUTO: 0.92 X10*3/UL (ref 0.1–1)
MONOCYTES NFR BLD AUTO: 10.7 %
NEUTROPHILS # BLD AUTO: 6.3 X10*3/UL (ref 1.2–7.7)
NEUTROPHILS NFR BLD AUTO: 73.6 %
NRBC BLD-RTO: 0 /100 WBCS (ref 0–0)
PLATELET # BLD AUTO: 232 X10*3/UL (ref 150–450)
POTASSIUM SERPL-SCNC: 3.9 MMOL/L (ref 3.5–5.3)
RBC # BLD AUTO: 3.21 X10*6/UL (ref 4–5.2)
RH FACTOR (ANTIGEN D): NORMAL
SODIUM SERPL-SCNC: 142 MMOL/L (ref 136–145)
WBC # BLD AUTO: 8.6 X10*3/UL (ref 4.4–11.3)

## 2025-02-22 PROCEDURE — 85025 COMPLETE CBC W/AUTO DIFF WBC: CPT | Performed by: PHYSICIAN ASSISTANT

## 2025-02-22 PROCEDURE — 2500000004 HC RX 250 GENERAL PHARMACY W/ HCPCS (ALT 636 FOR OP/ED): Performed by: PHYSICIAN ASSISTANT

## 2025-02-22 PROCEDURE — 2500000001 HC RX 250 WO HCPCS SELF ADMINISTERED DRUGS (ALT 637 FOR MEDICARE OP): Performed by: PHYSICIAN ASSISTANT

## 2025-02-22 PROCEDURE — 1170000001 HC PRIVATE ONCOLOGY ROOM DAILY

## 2025-02-22 PROCEDURE — 2500000004 HC RX 250 GENERAL PHARMACY W/ HCPCS (ALT 636 FOR OP/ED): Performed by: NURSE PRACTITIONER

## 2025-02-22 PROCEDURE — 2500000005 HC RX 250 GENERAL PHARMACY W/O HCPCS: Performed by: PHYSICIAN ASSISTANT

## 2025-02-22 PROCEDURE — 80048 BASIC METABOLIC PNL TOTAL CA: CPT | Performed by: PHYSICIAN ASSISTANT

## 2025-02-22 PROCEDURE — 36415 COLL VENOUS BLD VENIPUNCTURE: CPT | Performed by: NURSE PRACTITIONER

## 2025-02-22 PROCEDURE — 99231 SBSQ HOSP IP/OBS SF/LOW 25: CPT | Performed by: STUDENT IN AN ORGANIZED HEALTH CARE EDUCATION/TRAINING PROGRAM

## 2025-02-22 PROCEDURE — 82374 ASSAY BLOOD CARBON DIOXIDE: CPT | Performed by: PHYSICIAN ASSISTANT

## 2025-02-22 PROCEDURE — 2500000001 HC RX 250 WO HCPCS SELF ADMINISTERED DRUGS (ALT 637 FOR MEDICARE OP): Performed by: NURSE PRACTITIONER

## 2025-02-22 PROCEDURE — 86901 BLOOD TYPING SEROLOGIC RH(D): CPT | Performed by: NURSE PRACTITIONER

## 2025-02-22 PROCEDURE — 99232 SBSQ HOSP IP/OBS MODERATE 35: CPT | Performed by: PHYSICIAN ASSISTANT

## 2025-02-22 RX ORDER — BACITRACIN ZINC 500 UNIT/G
OINTMENT IN PACKET (EA) TOPICAL 3 TIMES DAILY
Status: DISPENSED | OUTPATIENT
Start: 2025-02-22

## 2025-02-22 RX ORDER — CELECOXIB 200 MG/1
200 CAPSULE ORAL 2 TIMES DAILY
Status: DISPENSED | OUTPATIENT
Start: 2025-02-22

## 2025-02-22 RX ORDER — GABAPENTIN 100 MG/1
100 CAPSULE ORAL EVERY 8 HOURS
Status: DISCONTINUED | OUTPATIENT
Start: 2025-02-22 | End: 2025-02-22

## 2025-02-22 RX ORDER — GABAPENTIN 300 MG/1
300 CAPSULE ORAL EVERY 8 HOURS SCHEDULED
Status: DISCONTINUED | OUTPATIENT
Start: 2025-02-22 | End: 2025-02-22

## 2025-02-22 RX ORDER — POLYETHYLENE GLYCOL 3350 17 G/17G
17 POWDER, FOR SOLUTION ORAL DAILY
Status: DISPENSED | OUTPATIENT
Start: 2025-02-23

## 2025-02-22 RX ORDER — GABAPENTIN 100 MG/1
100 CAPSULE ORAL EVERY 8 HOURS
Status: DISPENSED | OUTPATIENT
Start: 2025-02-22

## 2025-02-22 RX ORDER — MAGNESIUM SULFATE HEPTAHYDRATE 40 MG/ML
2 INJECTION, SOLUTION INTRAVENOUS ONCE
Status: COMPLETED | OUTPATIENT
Start: 2025-02-22 | End: 2025-02-22

## 2025-02-22 RX ADMIN — HEPARIN SODIUM 5000 UNITS: 5000 INJECTION, SOLUTION INTRAVENOUS; SUBCUTANEOUS at 15:43

## 2025-02-22 RX ADMIN — METHOCARBAMOL 500 MG: 500 TABLET ORAL at 18:03

## 2025-02-22 RX ADMIN — CEFAZOLIN SODIUM 2 G: 2 INJECTION, SOLUTION INTRAVENOUS at 15:43

## 2025-02-22 RX ADMIN — GABAPENTIN 300 MG: 300 CAPSULE ORAL at 05:59

## 2025-02-22 RX ADMIN — CELECOXIB 200 MG: 200 CAPSULE ORAL at 08:01

## 2025-02-22 RX ADMIN — METHOCARBAMOL 500 MG: 500 TABLET ORAL at 12:00

## 2025-02-22 RX ADMIN — DOCUSATE SODIUM 100 MG: 100 CAPSULE, LIQUID FILLED ORAL at 08:00

## 2025-02-22 RX ADMIN — NITROGLYCERIN 0.5 INCH: 20 OINTMENT TOPICAL at 20:17

## 2025-02-22 RX ADMIN — BACITRACIN 1 APPLICATION: 500 OINTMENT TOPICAL at 10:52

## 2025-02-22 RX ADMIN — MAGNESIUM SULFATE HEPTAHYDRATE 2 G: 40 INJECTION, SOLUTION INTRAVENOUS at 04:03

## 2025-02-22 RX ADMIN — NITROGLYCERIN 0.5 INCH: 20 OINTMENT TOPICAL at 10:51

## 2025-02-22 RX ADMIN — PANTOPRAZOLE SODIUM 40 MG: 40 TABLET, DELAYED RELEASE ORAL at 08:01

## 2025-02-22 RX ADMIN — ACETAMINOPHEN 975 MG: 325 TABLET ORAL at 08:01

## 2025-02-22 RX ADMIN — DOCUSATE SODIUM 100 MG: 100 CAPSULE, LIQUID FILLED ORAL at 20:15

## 2025-02-22 RX ADMIN — BACITRACIN 1 APPLICATION: 500 OINTMENT TOPICAL at 20:04

## 2025-02-22 RX ADMIN — GABAPENTIN 100 MG: 100 CAPSULE ORAL at 15:50

## 2025-02-22 RX ADMIN — METHOCARBAMOL 500 MG: 500 TABLET ORAL at 05:59

## 2025-02-22 RX ADMIN — BACITRACIN 1 APPLICATION: 500 OINTMENT TOPICAL at 14:00

## 2025-02-22 RX ADMIN — ACETAMINOPHEN 975 MG: 325 TABLET ORAL at 15:43

## 2025-02-22 RX ADMIN — ASPIRIN 81 MG: 81 TABLET, COATED ORAL at 15:43

## 2025-02-22 RX ADMIN — CEFAZOLIN SODIUM 2 G: 2 INJECTION, SOLUTION INTRAVENOUS at 08:02

## 2025-02-22 RX ADMIN — HEPARIN SODIUM 5000 UNITS: 5000 INJECTION, SOLUTION INTRAVENOUS; SUBCUTANEOUS at 08:01

## 2025-02-22 RX ADMIN — CELECOXIB 200 MG: 200 CAPSULE ORAL at 20:14

## 2025-02-22 SDOH — ECONOMIC STABILITY: FOOD INSECURITY: WITHIN THE PAST 12 MONTHS, THE FOOD YOU BOUGHT JUST DIDN'T LAST AND YOU DIDN'T HAVE MONEY TO GET MORE.: NEVER TRUE

## 2025-02-22 SDOH — ECONOMIC STABILITY: TRANSPORTATION INSECURITY
IN THE PAST 12 MONTHS, HAS LACK OF TRANSPORTATION KEPT YOU FROM MEETINGS, WORK, OR FROM GETTING THINGS NEEDED FOR DAILY LIVING?: NO

## 2025-02-22 SDOH — ECONOMIC STABILITY: TRANSPORTATION INSECURITY: IN THE PAST 12 MONTHS, HAS LACK OF TRANSPORTATION KEPT YOU FROM MEDICAL APPOINTMENTS OR FROM GETTING MEDICATIONS?: NO

## 2025-02-22 SDOH — ECONOMIC STABILITY: HOUSING INSECURITY: IN THE LAST 12 MONTHS, WAS THERE A TIME WHEN YOU WERE NOT ABLE TO PAY THE MORTGAGE OR RENT ON TIME?: NO

## 2025-02-22 SDOH — ECONOMIC STABILITY: FOOD INSECURITY

## 2025-02-22 SDOH — ECONOMIC STABILITY: INCOME INSECURITY: IN THE LAST 12 MONTHS, WAS THERE A TIME WHEN YOU WERE NOT ABLE TO PAY THE MORTGAGE OR RENT ON TIME?: NO

## 2025-02-22 SDOH — ECONOMIC STABILITY: HOUSING INSECURITY

## 2025-02-22 SDOH — ECONOMIC STABILITY: HOUSING INSECURITY: IN THE PAST 12 MONTHS HAS THE ELECTRIC, GAS, OIL, OR WATER COMPANY THREATENED TO SHUT OFF SERVICES IN YOUR HOME?: NO

## 2025-02-22 SDOH — ECONOMIC STABILITY: FOOD INSECURITY: WITHIN THE PAST 12 MONTHS, YOU WORRIED THAT YOUR FOOD WOULD RUN OUT BEFORE YOU GOT MONEY TO BUY MORE.: NEVER TRUE

## 2025-02-22 SDOH — ECONOMIC STABILITY: GENERAL

## 2025-02-22 SDOH — ECONOMIC STABILITY: TRANSPORTATION INSECURITY

## 2025-02-22 SDOH — ECONOMIC STABILITY: HOUSING INSECURITY
IN THE LAST 12 MONTHS, WAS THERE A TIME WHEN YOU DID NOT HAVE A STEADY PLACE TO SLEEP OR SLEPT IN A SHELTER (INCLUDING NOW)?: NO

## 2025-02-22 SDOH — ECONOMIC STABILITY: TRANSPORTATION INSECURITY
IN THE PAST 12 MONTHS, HAS THE LACK OF TRANSPORTATION KEPT YOU FROM MEDICAL APPOINTMENTS OR FROM GETTING MEDICATIONS?: NO

## 2025-02-22 SDOH — ECONOMIC STABILITY: HOUSING INSECURITY: IN THE LAST 12 MONTHS, HOW MANY PLACES HAVE YOU LIVED?: 1

## 2025-02-22 SDOH — ECONOMIC STABILITY: FOOD INSECURITY: WITHIN THE PAST 12 MONTHS, YOU WORRIED THAT YOUR FOOD WOULD RUN OUT BEFORE YOU GOT THE MONEY TO BUY MORE.: NEVER TRUE

## 2025-02-22 ASSESSMENT — COGNITIVE AND FUNCTIONAL STATUS - GENERAL
DRESSING REGULAR UPPER BODY CLOTHING: A LITTLE
CLIMB 3 TO 5 STEPS WITH RAILING: A LITTLE
DRESSING REGULAR UPPER BODY CLOTHING: A LITTLE
DRESSING REGULAR LOWER BODY CLOTHING: A LITTLE
TOILETING: A LITTLE
MOBILITY SCORE: 18
WALKING IN HOSPITAL ROOM: A LITTLE
STANDING UP FROM CHAIR USING ARMS: A LITTLE
WALKING IN HOSPITAL ROOM: A LITTLE
MOVING FROM LYING ON BACK TO SITTING ON SIDE OF FLAT BED WITH BEDRAILS: A LITTLE
DRESSING REGULAR LOWER BODY CLOTHING: A LITTLE
HELP NEEDED FOR BATHING: A LITTLE
DAILY ACTIVITIY SCORE: 21
CLIMB 3 TO 5 STEPS WITH RAILING: A LITTLE
MOBILITY SCORE: 21
TURNING FROM BACK TO SIDE WHILE IN FLAT BAD: A LITTLE
MOVING TO AND FROM BED TO CHAIR: A LITTLE
STANDING UP FROM CHAIR USING ARMS: A LITTLE
DAILY ACTIVITIY SCORE: 20
HELP NEEDED FOR BATHING: A LITTLE

## 2025-02-22 ASSESSMENT — PAIN - FUNCTIONAL ASSESSMENT
PAIN_FUNCTIONAL_ASSESSMENT: 0-10
PAIN_FUNCTIONAL_ASSESSMENT: 0-10

## 2025-02-22 ASSESSMENT — PAIN SCALES - GENERAL
PAINLEVEL_OUTOF10: 2
PAINLEVEL_OUTOF10: 9

## 2025-02-22 ASSESSMENT — SOCIAL DETERMINANTS OF HEALTH (SDOH): IN THE PAST 12 MONTHS, HAS THE ELECTRIC, GAS, OIL, OR WATER COMPANY THREATENED TO SHUT OFF SERVICE IN YOUR HOME?: NO

## 2025-02-22 ASSESSMENT — ACTIVITIES OF DAILY LIVING (ADL): LACK_OF_TRANSPORTATION: NO

## 2025-02-22 NOTE — PROGRESS NOTES
Department of Plastic and Reconstructive Surgery  Progress Note    59 year old female with PMH of left breast DCIS and right breast ADH who is now s/p right magseed excisional biopsy, left skin or nipple sparing mastectomy, left intraoperative lymphatic mapping, axillary sentinel lymph node biopsy with Dr. Barnett and left breast TAMARA reconstruction with Dr. Reyes.    Subjective   Resting comfortably in bed with  at bedside this morning, pain well controlled, states 3-4/10 with main concern of burning sensation around abdominal incision/wound vac. Continues to have left lower arm and 4 finger numbness sensation which was present in PACU post-op. Denies any fever, chills, night sweats, CP, SOB, palpitations, nausea, vomiting, diarrhea, constipation, dysuria, hematuria, hematochezia, hematemesis, flank pain.     Objective     Physical Exam  Constitutional: A&Ox3, calm and cooperative, NAD.  Eyes: PERRL, EOMI  ENMT: Moist mucous membranes, no apparent injuries or lesions.  Head/Neck: NC/AT.   Cardiovascular: Normal rate and regular rhythm. 2+ equal pulses of the distal extremities.  Respiratory/Thorax: CTAB, regular respirations. Good symmetric chest expansion.   Gastrointestinal: Abdomen soft, appropriately tender, no peritoneal signs, +BS x 4, no BM, Incisional wound vac to lower abdominal incision without alarms for leak or malfunction. Holding appropriate suction at -125mmHg. Tissue surrounding vac dressing without erythema or edema. Aquacel AG covering umbilicus, dried sanguinous drainage on dressing, JUSTIN drain x 2 to bilateral abdomen with ss output. Abdominal binder in place.  TAMARA free flap recipient site to left breast which is appropriate color, pale but same color as native tissue, without pallor. Warm to touch, soft. Incision lines circumferentially around flap, well approximated with sutures and dermabond, no evidence of bleeding or dehiscence. Intact venous Doppler pulse at marked site of flap  "tissue. Appropriately tender to palpation at flap and surrounding tissue. 2+ Cap refill at the flap. No areas of surrounding edema, fluid collection, induration, drainage or bleeding. x1 JUSTIN drain to left breast with ss output. Surgical bra in place, nerve block in place  Genitourinary: indwelling humphreys in place with pale green yellow urine output from ICG, humphreys to be removed today  Extremities: No peripheral edema. Moving all 4 extremities actively.   Neurological: A&Ox3.   Psychological: Appropriate mood and behavior.      Last Recorded Vitals  Blood pressure 110/69, pulse 75, temperature 36.7 °C (98.1 °F), temperature source Temporal, resp. rate 18, height 1.676 m (5' 5.98\"), weight 65.3 kg (143 lb 15.4 oz), SpO2 99%.  Intake/Output last 3 Shifts:  I/O last 3 completed shifts:  In: 2776 (42.5 mL/kg) [I.V.:676 (10.4 mL/kg); IV Piggyback:2100]  Out: 1275 (19.5 mL/kg) [Urine:1030 (0.4 mL/kg/hr); Drains:245]  Weight: 65.3 kg     Relevant Results  Scheduled medications  acetaminophen, 975 mg, oral, q8h  aspirin, 81 mg, oral, Daily  bacitracin, , Topical, TID  ceFAZolin, 2 g, intravenous, q8h  [Held by provider] celecoxib, 200 mg, oral, BID  docusate sodium, 100 mg, oral, BID  gabapentin, 300 mg, oral, q8h LINDA  heparin (porcine), 5,000 Units, subcutaneous, q8h  methocarbamol, 500 mg, oral, q6h LINDA  nitroglycerin, 0.5 inch, transdermal, Daily  pantoprazole, 40 mg, oral, Daily before breakfast   Or  pantoprazole, 40 mg, intravenous, Daily before breakfast      Continuous medications  ropivacaine (PF) in NS cmpd, 10 mL/hr  sodium chloride 0.9%, 100 mL/hr, Last Rate: 100 mL/hr (02/21/25 2225)      PRN medications  PRN medications: magnesium hydroxide, ondansetron **OR** ondansetron    Results for orders placed or performed during the hospital encounter of 02/21/25 (from the past 24 hours)   CBC   Result Value Ref Range    WBC 16.8 (H) 4.4 - 11.3 x10*3/uL    nRBC 0.0 0.0 - 0.0 /100 WBCs    RBC 3.45 (L) 4.00 - 5.20 x10*6/uL "    Hemoglobin 11.0 (L) 12.0 - 16.0 g/dL    Hematocrit 32.8 (L) 36.0 - 46.0 %    MCV 95 80 - 100 fL    MCH 31.9 26.0 - 34.0 pg    MCHC 33.5 32.0 - 36.0 g/dL    RDW 11.8 11.5 - 14.5 %    Platelets 240 150 - 450 x10*3/uL   Renal Function Panel   Result Value Ref Range    Glucose 136 (H) 74 - 99 mg/dL    Sodium 138 136 - 145 mmol/L    Potassium 4.4 3.5 - 5.3 mmol/L    Chloride 103 98 - 107 mmol/L    Bicarbonate 23 21 - 32 mmol/L    Anion Gap 16 10 - 20 mmol/L    Urea Nitrogen 12 6 - 23 mg/dL    Creatinine 0.78 0.50 - 1.05 mg/dL    eGFR 89 >60 mL/min/1.73m*2    Calcium 8.8 8.6 - 10.6 mg/dL    Phosphorus 4.2 2.5 - 4.9 mg/dL    Albumin 4.4 3.4 - 5.0 g/dL   Magnesium   Result Value Ref Range    Magnesium 1.69 1.60 - 2.40 mg/dL   CBC and Auto Differential   Result Value Ref Range    WBC 8.6 4.4 - 11.3 x10*3/uL    nRBC 0.0 0.0 - 0.0 /100 WBCs    RBC 3.21 (L) 4.00 - 5.20 x10*6/uL    Hemoglobin 9.7 (L) 12.0 - 16.0 g/dL    Hematocrit 30.4 (L) 36.0 - 46.0 %    MCV 95 80 - 100 fL    MCH 30.2 26.0 - 34.0 pg    MCHC 31.9 (L) 32.0 - 36.0 g/dL    RDW 12.0 11.5 - 14.5 %    Platelets 232 150 - 450 x10*3/uL    Neutrophils % 73.6 40.0 - 80.0 %    Immature Granulocytes %, Automated 0.4 0.0 - 0.9 %    Lymphocytes % 15.0 13.0 - 44.0 %    Monocytes % 10.7 2.0 - 10.0 %    Eosinophils % 0.1 0.0 - 6.0 %    Basophils % 0.2 0.0 - 2.0 %    Neutrophils Absolute 6.30 1.20 - 7.70 x10*3/uL    Immature Granulocytes Absolute, Automated 0.03 0.00 - 0.70 x10*3/uL    Lymphocytes Absolute 1.28 1.20 - 4.80 x10*3/uL    Monocytes Absolute 0.92 0.10 - 1.00 x10*3/uL    Eosinophils Absolute 0.01 0.00 - 0.70 x10*3/uL    Basophils Absolute 0.02 0.00 - 0.10 x10*3/uL   Basic metabolic panel   Result Value Ref Range    Glucose 103 (H) 74 - 99 mg/dL    Sodium 142 136 - 145 mmol/L    Potassium 3.9 3.5 - 5.3 mmol/L    Chloride 107 98 - 107 mmol/L    Bicarbonate 28 21 - 32 mmol/L    Anion Gap 11 10 - 20 mmol/L    Urea Nitrogen 7 6 - 23 mg/dL    Creatinine 0.70 0.50 - 1.05  mg/dL    eGFR >90 >60 mL/min/1.73m*2    Calcium 8.8 8.6 - 10.6 mg/dL       BI RAD breast exam specimen    Result Date: 2/21/2025  Interpreted By:  Juanjo Chua, STUDY: BI RAD BREAST EXAM SPECIMEN;  2/21/2025 9:20 am   ACCESSION NUMBER(S): [id number]   ORDERING CLINICIAN: PRITESH RAM   INDICATION: Right Breast Magseed localization.   ,R92.8 Other abnormal and inconclusive findings on diagnostic imaging of breast,N60.91 Unspecified benign mammary dysplasia of right breast   FINDINGS: The specimen radiograph demonstrates the Mag seed and residual calcifications.       Status post surgical excision of Right breast Magseed localization.   Findings were called to Dr. Ram in the OR at the time of interpretation.   MACRO: None   Signed by: Juanjo Chua 2/21/2025 5:24 PM Dictation workstation:   [id number]    NM injection only for sentinel node biopsy  no scan performed    Result Date: 2/21/2025  Interpreted By:  Eladia Claros and Ritchie Brandon STUDY: NM INJECTION ONLY FOR SENTINEL NODE BIOPSY  NO SCAN PERFORMED; 2/21/2025 8:26 am   INDICATION: Signs/Symptoms:intraoperative lymnphatic mapping for left axillary SLNB.   COMPARISON: None.   ACCESSION NUMBER(S): [id number]   ORDERING CLINICIAN: PRITESH RAM   TECHNIQUE: DIVISION OF NUCLEAR MEDICINE BREAST SENTINEL NODE LOCALIZATION   Syringes containing a total of 0.62 millicuries of Tc-99m tilmanocept (Lymphoseek) were delivered to the [facility] for injection and sentinel lymph node localization to be performed by the patient's attending breast surgeon at lymph node dissection/biopsy.   FINDINGS: Injection only, no images were acquired.       1. Breast carcinoma undergoing peritumoral Tc-99m tilmanocept (Lymphoseek) injection for intraoperative sentinel lymph node localization.   I personally reviewed the images/study and I agree with the findings as stated.  This study was interpreted at [facility], [place], [place].   MACRO: None    Signed by: Eladia Claros 2/21/2025 10:01 AM Dictation workstation:   [id number]    BI US breast limited right    Result Date: 2/19/2025  Interpreted By:  Richy Farrell and Marshall Colin STUDY: BI MAMMO GUIDED BREAST RIGHT LOCALIZATION; BI US BREAST LIMITED RIGHT; 2/19/2025 3:09 pm; 2/19/2025 2:50 pm   ACCESSION NUMBER(S): [id number]; [id number]   ORDERING CLINICIAN: PRITESH RAM   INDICATION: Magseed localization of  for surgical planning.   ,R92.8 Other abnormal and inconclusive findings on diagnostic imaging of breast; ,N60.91 Unspecified benign mammary dysplasia of right breast   FINDINGS: Preprocedure ultrasound images of the right breast were obtained in attempt to locate the patient's biopsy tissue marker associated with the right breast atypia at the 9 o'clock position 3 cm from the nipple. Localization of the biopsy tissue marker under ultrasound guidance was unsuccessful. Therefore, decision was made the stereotactic localization of the biopsy tissue marker for Magseed placement as described below.   PREPROCEDURAL CONSULTATION: The history and physical exam pertinent to the procedure were reviewed and no updates were made.   The procedure was explained to the patient including the risks, benefits, and alternatives. Medications were discussed, including any prior use of blood thinning medications by the patient. Patient allergies were reviewed. The risks, including but not limited to infection and bleeding, were reviewed by the performing physician and the patient agreed to undergo the procedure.   Prior to the procedure, an audible timeout was done to verify patient identification, site and type of procedure. Dr. Richy Farrell, Dr. Contreras Rios, a radiology nurse and a mammography technologist were present.   PROCEDURE: The right breast was marked and sterilely prepped. The heart-shaped Bard biopsy marker was identified in the central lateral quadrant at middle depth. 5 mL of buffered 1%  lidocaine was injected subcutaneously and into the deeper tissues. A 12 cm needle containing a Magseed was advanced into the area of concern using a superior approach. The biopsy marker was localized using a Magseed. The patient tolerated the procedure without difficulty.   Additional images obtained after placement of the Magseed demonstrate satisfactory location of the Magseed in relation to the tissue marker.       Status post mammographic-guided Magseed localization of a right breast tissue marker associated with previously biopsied atypia.   POST PROCEDURE MAMMOGRAM FOR MARKER PLACEMENT.   MACRO: None   Signed by: Richy Farrell 2/19/2025 4:02 PM Dictation workstation:   [id number]    BI mammo guided breast right localization    Result Date: 2/19/2025  Interpreted By:  Richy Farrell and Marshall Colin STUDY: BI MAMMO GUIDED BREAST RIGHT LOCALIZATION; BI US BREAST LIMITED RIGHT; 2/19/2025 3:09 pm; 2/19/2025 2:50 pm   ACCESSION NUMBER(S): [id number]; [id number]   ORDERING CLINICIAN: PRITESH RAM   INDICATION: Magseed localization of  for surgical planning.   ,R92.8 Other abnormal and inconclusive findings on diagnostic imaging of breast; ,N60.91 Unspecified benign mammary dysplasia of right breast   FINDINGS: Preprocedure ultrasound images of the right breast were obtained in attempt to locate the patient's biopsy tissue marker associated with the right breast atypia at the 9 o'clock position 3 cm from the nipple. Localization of the biopsy tissue marker under ultrasound guidance was unsuccessful. Therefore, decision was made the stereotactic localization of the biopsy tissue marker for Magseed placement as described below.   PREPROCEDURAL CONSULTATION: The history and physical exam pertinent to the procedure were reviewed and no updates were made.   The procedure was explained to the patient including the risks, benefits, and alternatives. Medications were discussed, including any prior use of blood  thinning medications by the patient. Patient allergies were reviewed. The risks, including but not limited to infection and bleeding, were reviewed by the performing physician and the patient agreed to undergo the procedure.   Prior to the procedure, an audible timeout was done to verify patient identification, site and type of procedure. Dr. Richy Farrell, Dr. Contreras Rios, a radiology nurse and a mammography technologist were present.   PROCEDURE: The right breast was marked and sterilely prepped. The heart-shaped Bard biopsy marker was identified in the central lateral quadrant at middle depth. 5 mL of buffered 1% lidocaine was injected subcutaneously and into the deeper tissues. A 12 cm needle containing a Magseed was advanced into the area of concern using a superior approach. The biopsy marker was localized using a Magseed. The patient tolerated the procedure without difficulty.   Additional images obtained after placement of the Magseed demonstrate satisfactory location of the Magseed in relation to the tissue marker.       Status post mammographic-guided Magseed localization of a right breast tissue marker associated with previously biopsied atypia.   POST PROCEDURE MAMMOGRAM FOR MARKER PLACEMENT.   MACRO: None   Signed by: Richy Farrell 2/19/2025 4:02 PM Dictation workstation:   [id number]    Right mammo-guided breast localization    Result Date: 2/19/2025  Source Facility: [facility] Interpreted By:  Richy Farrell and Marshall Colin STUDY: BI MAMMO GUIDED BREAST RIGHT LOCALIZATION; BI US BREAST LIMITED RIGHT; 2/19/2025 3:09 pm; 2/19/2025 2:50 pm    ACCESSION NUMBER(S): [id number]; [id number]    ORDERING CLINICIAN: PRITESH RAM    INDICATION: Magseed localization of  for surgical planning.    ,R92.8 Other abnormal and inconclusive findings on diagnostic imaging of breast; ,N60.91 Unspecified benign mammary dysplasia of right breast    FINDINGS: Preprocedure ultrasound images of  the right breast were obtained in attempt to locate the patient's biopsy tissue marker associated with the right breast atypia at the 9 o'clock position 3 cm from the nipple. Localization of the biopsy tissue marker under ultrasound guidance was unsuccessful. Therefore, decision was made the stereotactic localization of the biopsy tissue marker for Magseed placement as described below.    PREPROCEDURAL CONSULTATION: The history and physical exam pertinent to the procedure were reviewed and no updates were made.    The procedure was explained to the patient including the risks, benefits, and alternatives. Medications were discussed, including any prior use of blood thinning medications by the patient. Patient allergies were reviewed. The risks, including but not limited to infection and bleeding, were reviewed by the performing physician and the patient agreed to undergo the procedure.    Prior to the procedure, an audible timeout was done to verify patient identification, site and type of procedure. Dr. Richy Farrell, Dr. Contreras Rios, a radiology nurse and a mammography technologist were present.    PROCEDURE: The right breast was marked and sterilely prepped. The heart-shaped Bard biopsy marker was identified in the central lateral quadrant at middle depth. 5 mL of buffered 1% lidocaine was injected subcutaneously and into the deeper tissues. A 12 cm needle containing a Magseed was advanced into the area of concern using a superior approach. The biopsy marker was localized using a Magseed. The patient tolerated the procedure without difficulty.    Additional images obtained after placement of the Magseed demonstrate satisfactory location of the Magseed in relation to the tissue marker.    IMPRESSION: Status post mammographic-guided Magseed localization of a right breast tissue marker associated with previously biopsied atypia.    POST PROCEDURE MAMMOGRAM FOR MARKER PLACEMENT.    MACRO:  None    Signed by:  Richy Farrell 2/19/2025 4:02 PM Dictation workstation:   [id number]    US BREAST RIGHT    Result Date: 2/19/2025  Source Facility: [facility] Interpreted By:  Richy Farrell and Marshall Colin STUDY: BI MAMMO GUIDED BREAST RIGHT LOCALIZATION; BI US BREAST LIMITED RIGHT; 2/19/2025 3:09 pm; 2/19/2025 2:50 pm    ACCESSION NUMBER(S): [id number]; [id number]    ORDERING CLINICIAN: PRITESH RAM    INDICATION: Magseed localization of  for surgical planning.    ,R92.8 Other abnormal and inconclusive findings on diagnostic imaging of breast; ,N60.91 Unspecified benign mammary dysplasia of right breast    FINDINGS: Preprocedure ultrasound images of the right breast were obtained in attempt to locate the patient's biopsy tissue marker associated with the right breast atypia at the 9 o'clock position 3 cm from the nipple. Localization of the biopsy tissue marker under ultrasound guidance was unsuccessful. Therefore, decision was made the stereotactic localization of the biopsy tissue marker for Magseed placement as described below.    PREPROCEDURAL CONSULTATION: The history and physical exam pertinent to the procedure were reviewed and no updates were made.    The procedure was explained to the patient including the risks, benefits, and alternatives. Medications were discussed, including any prior use of blood thinning medications by the patient. Patient allergies were reviewed. The risks, including but not limited to infection and bleeding, were reviewed by the performing physician and the patient agreed to undergo the procedure.    Prior to the procedure, an audible timeout was done to verify patient identification, site and type of procedure. Dr. Richy Farrell, Dr. Contreras Rios, a radiology nurse and a mammography technologist were present.    PROCEDURE: The right breast was marked and sterilely prepped. The heart-shaped Bard biopsy marker was identified in the central lateral quadrant at middle  depth. 5 mL of buffered 1% lidocaine was injected subcutaneously and into the deeper tissues. A 12 cm needle containing a Magseed was advanced into the area of concern using a superior approach. The biopsy marker was localized using a Magseed. The patient tolerated the procedure without difficulty.    Additional images obtained after placement of the Magseed demonstrate satisfactory location of the Magseed in relation to the tissue marker.    IMPRESSION: Status post mammographic-guided Magseed localization of a right breast tissue marker associated with previously biopsied atypia.    POST PROCEDURE MAMMOGRAM FOR MARKER PLACEMENT.    MACRO:  None    Signed by: Richy Farrell 2/19/2025 4:02 PM Dictation workstation:   [id number]     This patient has a urinary catheter   Reason for the urinary catheter remaining today? perioperative use for selected surgical procedures    Assessment/Plan   Assessment & Plan  Ductal carcinoma in situ (DCIS) of left breast    Atypical ductal hyperplasia of right breast    Breast cancer (Multi)      S/P Right magseed excisional biopsy, left skin or nipple sparing mastectomy, left intraoperative lymphatic mapping, axillary sentinel lymph node biopsy with Dr. Barnett and left breast TAMARA reconstruction:  A/P:   Flap surveillance:       - Continue serial flap assessments q1h per nursing with interval checks per plastic surgery team         -> Assess clinical appearance of flap (color, warmth, turgor) plus doppler pulse               signal at marked suture site          -> Check vioptix saturation level hourly (Must remain >30%)          -> Nursing to notify plastic surgery team immediately if there are any acute changes              (Including decreased Doppler/Vioptix signal, pallor, temperature change, bleeding, etc.)      - Monitor surgical sites for s/s of bleeding, infection or dehiscence    Warming:       - Use of Apple hugger to bilateral breast flap region and surrounding skin  overnight on POD0          ·  Settings: medium heat (38*C), medium continuous fan           ·  Avoid direct contact of warmer blanket with skin/flap          ·  DC Apple Hugger when able   Dressings/wound care:       - Dermabond to remain intact over breast flap incision lines, keep C/D/I       - Loose pink surgical bra       - Maintain Aquacel Ag covered by Tegaderm dressing at umbilical closure site      Drains:       - JUSTIN drain care to x 3, JUSTIN drains present at L breast and B/L lower abdomen         ·  Strip drain tubing TID and PRN        ·  Monitor and record output q8h         ·  Keep drain sites C/D/I, maintain CHG drain dressings         ·  Call plastics if drain output is >30cc in an hour or greater than 200cc over 8 hours        ·  Removal when output quantities <30 ml for 2 consecutive days    Vac therapy:       - Continue incisional wound vac therapy to lower transverse abdominal incision          ·  Settings: 125 mmHg low continuous suction          ·  Please keep dressing C/D/I, reinforce PRN          ·  Please alert plastics team with any concerns regarding vac          ·  Transition to Ensenda portable vac device prior to DC, will remove on outpt follow up    Positioning:       - Patient to maintain beach chair positioning at all times          ·  Head elevated and hips flexed at least 30 degrees, knees bent and elevated on pillows       - Avoid positioning that would apply pressure to flap region or incisions    Weight bearing:       - Patient OOB POD1 (2/22) order placed      - <5 lbs lifting restriction to BUE postoperatively    Other postoperative parameters/care:       - IV Ancef x3 doses postoperatively       - Remove humphreys on POD1 AM (2/22) order placed      - Full liquid diet ordered this morning, discontinue fluids          ·  Urine output goal of >1 mL/kg/hr      - SQ Heparin q8h scheduled to start postoperatively, continue while inpatient        - Nursing to record strict I&Os      - VS  monitoring q4h; maintain SBP >110 to ensure adequate flap perfusion      - Encouraged use of IS (10x/hr, each hr while awake)       - Perioperative scopolamine patch removal 72hrs postoperatively      # Acute postoperative pain  - Nursing to assess patient pain scales at least q4h and PRN  - Continue current regimen (ERAS protocol) as follows:        ·   Scheduled Tylenol 975mg q8        ·   Gabapentin 300mg TID        ·   Celebrex 200mg BID (on hold)       ·   Scheduled Toradol 30 mg Q8 for 5 doses, avoid narcotics  - Scheduled PO Colace 100 mg BID and PRN Milk of Mag for bowel regimen  - 4 mg IV Zofran q8h PRN nausea and/or vomiting 2/2 narcotic use      # Leukocytosis, resolved  - WBC count mildly elevated to 16.8 on postoperative labs; Considered likely reactive 2/2 surgery     #Acute on chronic anemia:  - Incoming HGB prior to admission 13.6  - H&H on postoperative labs 11.0/32.8 and POD#1 9.7/30.4  - Presently no S/S of bleeding, considered likely due to intraoperative blood loss   - Keep T&S updated, last 2/19; ordered for AM labs 2/23  - Monitor for S/S of bleeding or symptomatic anemia   - Low transfusion threshold to ensure adequate flap perfusion  - Transfuse for HGB <7 per protocol   - Monitor on AM CBC      F: none, enourage PO intake   E: Monitor and replete PRN       ·   Serum Mg decreased to 1.69 on postoperative labs, replaced with 2g IV post op, recheck mag tomorrow AM       ·   Continue daily MV  N: full liquid diet, possibly advance this evening  GI: Continue bowel regimen with Colace BID and Milk of mag PRN        IV/PO Zofran PRN for nausea     DVT ppx:  - Risk score assessed  - SQ heparin q8h while inpatient, transition to Lovenox on POD#3  - Use of SCDs while on bedrest  - early ambulation and OOB POD#1 (2/22)       Disposition: Continue care on RNF. Will remain inpatient for continued flap monitoring and vac/drain surveillance postoperatively.      Patient and plan discussed with Dr. Lux  Oliver.     Leola Henry PA-C  Plastic and Reconstructive Surgery   Available via [employer]  Pager #[phone]  Team phone: [phone]

## 2025-02-22 NOTE — ASSESSMENT & PLAN NOTE
S/P Right magseed excisional biopsy, left skin or nipple sparing mastectomy, left intraoperative lymphatic mapping, axillary sentinel lymph node biopsy with Dr. Barnett and left breast TAMARA reconstruction:  A/P:   Flap surveillance:       - Continue serial flap assessments q1h per nursing with interval checks per plastic surgery team         -> Assess clinical appearance of flap (color, warmth, turgor) plus doppler pulse               signal at marked suture site          -> Check vioptix saturation level hourly (Must remain >30%)          -> Nursing to notify plastic surgery team immediately if there are any acute changes              (Including decreased Doppler/Vioptix signal, pallor, temperature change, bleeding, etc.)      - Monitor surgical sites for s/s of bleeding, infection or dehiscence    Warming:       - Use of Apple hugger to bilateral breast flap region and surrounding skin overnight on POD0          ·  Settings: medium heat (38*C), medium continuous fan           ·  Avoid direct contact of warmer blanket with skin/flap          ·  DC Apple Hugger when able   Dressings/wound care:       - Dermabond to remain intact over breast flap incision lines, keep C/D/I       - Loose pink surgical bra       - Maintain Aquacel Ag covered by Tegaderm dressing at umbilical closure site      Drains:       - JUSTIN drain care to x 3, JUSTIN drains present at L breast and B/L lower abdomen         ·  Strip drain tubing TID and PRN        ·  Monitor and record output q8h         ·  Keep drain sites C/D/I, maintain CHG drain dressings         ·  Call plastics if drain output is >30cc in an hour or greater than 200cc over 8 hours        ·  Removal when output quantities <30 ml for 2 consecutive days    Vac therapy:       - Continue incisional wound vac therapy to lower transverse abdominal incision          ·  Settings: 125 mmHg low continuous suction          ·  Please keep dressing C/D/I, reinforce PRN          ·  Please alert  plastics team with any concerns regarding vac          ·  Transition to Prevena portable vac device prior to DC, will remove on outpt follow up    Positioning:       - Patient to maintain beach chair positioning at all times          ·  Head elevated and hips flexed at least 30 degrees, knees bent and elevated on pillows       - Avoid positioning that would apply pressure to flap region or incisions    Weight bearing:       - Patient to maintain strict bedrest postoperatively on POD0, anticipate being OOB POD1       - <5 lbs lifting restriction to BUE postoperatively    Other postoperative parameters/care:       - IV Ancef x3 doses postoperatively       - Maintain Rodríguez while on bedrest overnight on POD0 with anticipated removal on POD1 AM      - NPO until re-assessment on POD #1 AM          ·  NS @ 100 mL while NPO          ·  Urine output goal of >1 mL/kg/hr      - SQ Heparin q8h scheduled to start postoperatively, continue while inpatient        - Nursing to record strict I&Os      - VS monitoring q4h      - Encouraged use of IS (10x/hr, each hr while awake)       - Perioperative scopolamine patch removal 72hrs postoperatively      # Acute postoperative pain  - Nursing to assess patient pain scales at least q4h and PRN  - Continue current regimen ([facility]AS protocol) as follows:        ·   Scheduled Tylenol 975mg q8        ·   Gabapentin 300mg TID        ·   Celebrex 200mg BID       ·   Scheduled Toradol 30 mg Q8 for 5 doses, avoid narcotics  - Scheduled PO Colace 100 mg BID and PRN Milk of Mag for bowel regimen  - 4 mg IV Zofran q8h PRN nausea and/or vomiting 2/2 narcotic use      # Leukocytosis   - WBC count mildly elevated to 16.8 on postoperative labs   - Considered likely reactive 2/2 surgery   - Presently no s/s infection, VSS, on eduardo-op ancef   - Monitor for resolution on daily CBC  - VS q4h      #Acute on chronic anemia:  - Incoming HGB prior to admission 13.6  - H&H on postoperative labs 11.0/32.8  -  Presently no S/S of bleeding, considered likely due to intraoperative blood loss   - Keep T&S updated, last 2/19  - Monitor for S/S of bleeding or symptomatic anemia   - Low transfusion threshold to ensure adequate flap perfusion  - Transfuse for HGB <7 per protocol   - Monitor on AM CBC      F: NS@ 100ml/hr while NPO overnight on POD0  E: Monitor and replete PRN       ·   Serum Mg decreased to 1.69 on postoperative labs, replaced with 2g IV post op       ·   Continue daily MV  N: NPO overnight on POD#0, anticipate transition to CLD on POD1 AM if flap exams stable   GI: Continue bowel regimen with Colace BID and Milk of mag PRN        IV/PO Zofran PRN for nausea     DVT ppx:  - Risk score assessed  - SQ heparin q8h while inpatient, transition to Lovenox on POD#3  - Use of SCDs while on bedrest  - Anticipate early ambulation once off bedrest on POD1       Disposition: Continue care on RNF. Will remain inpatient for continued flap monitoring and vac/drain surveillance postoperatively.      Patient and plan discussed with Dr. Reyes.     TYRONE Modi-CNP  Plastic and Reconstructive Surgery   Available via [employer]  Pager #[phone]  Team phone: [phone]

## 2025-02-22 NOTE — PROGRESS NOTES
Department of Plastic and Reconstructive Surgery  Post Op Check/Progress Note  59 year old female with PMH of left breast DCIS and right breast ADH who is now s/p right magseed excisional biopsy, left skin or nipple sparing mastectomy, left intraoperative lymphatic mapping, axillary sentinel lymph node biopsy with Dr. Barnett and left breast TAMARA reconstruction with Dr. Reyes.    Subjective   Resting comfortably in bed, pain well controlled, NPO, Denies any fever, chills, night sweats, CP, SOB, palpitations, nausea, vomiting, diarrhea, constipation, dysuria, hematuria, hematochezia, hematemesis, flank pain.     Objective     Physical Exam  Constitutional: A&Ox3, calm and cooperative, NAD.  Eyes: PERRL, EOMI  ENMT: Moist mucous membranes, no apparent injuries or lesions.  Head/Neck: NC/AT.   Cardiovascular: Normal rate and regular rhythm. 2+ equal pulses of the distal extremities.  Respiratory/Thorax: CTAB, regular respirations on 2L NC. Good symmetric chest expansion.   Gastrointestinal: Abdomen soft, appropriately tender, no peritoneal signs, +BS x 4, no BM, Incisional wound vac to lower abdominal incision without alarms for leak or malfunction. Holding appropriate suction at -125mmHg. Tissue surrounding vac dressing without erythema or edema. Aquacel AG covering umbilicus, dried sanguinous drainage on dressing, JUSTIN drain x 2 to bilateral abdomen with ss output. Abdominal binder in place.  TAMARA free flap recipient site to left breast which is appropriate color, pale but same color as native tissue, without pallor. Warm to touch, soft. Incision lines circumferentially around flap, well approximated with sutures and dermabond, no evidence of bleeding or dehiscence. Intact venous Doppler pulse at marked site of flap tissue. Appropriately tender to palpation at flap and surrounding tissue. 2+ Cap refill at the flap. No areas of surrounding edema, fluid collection, induration, drainage or bleeding. x1 JUSTIN drain to left  "breast with ss output. Surgical bra in place, nerve block in place  Genitourinary: indwelling humphreys in place with green blue urine output from ICG  Extremities: No peripheral edema. Moving all 4 extremities actively.   Neurological: A&Ox3.   Psychological: Appropriate mood and behavior.      Last Recorded Vitals  Blood pressure 109/64, pulse 71, temperature 36.3 °C (97.3 °F), temperature source Temporal, resp. rate 16, height 1.676 m (5' 5.98\"), weight 65.3 kg (143 lb 15.4 oz), SpO2 98%.  Intake/Output last 3 Shifts:  I/O last 3 completed shifts:  In: 2000 (30.6 mL/kg) [IV Piggyback:2000]  Out: 470 (7.2 mL/kg) [Urine:470 (0.2 mL/kg/hr)]  Weight: 65.3 kg     Relevant Results  Scheduled medications  acetaminophen, 975 mg, oral, q8h  aspirin, 81 mg, oral, Daily  ceFAZolin, 2 g, intravenous, q8h  docusate sodium, 100 mg, oral, BID  heparin (porcine), 5,000 Units, subcutaneous, q8h  methocarbamol, 500 mg, oral, q6h LINDA  pantoprazole, 40 mg, oral, Daily before breakfast   Or  pantoprazole, 40 mg, intravenous, Daily before breakfast      Continuous medications  ropivacaine (PF) in NS cmpd, 10 mL/hr  sodium chloride 0.9%, 100 mL/hr, Last Rate: 100 mL/hr (02/21/25 2225)      PRN medications  PRN medications: magnesium hydroxide, ondansetron **OR** ondansetron    Results for orders placed or performed during the hospital encounter of 02/21/25 (from the past 24 hours)   Verify ABO/Rh Group Test (VERAB)   Result Value Ref Range    ABO TYPE A     Rh TYPE POS    CBC   Result Value Ref Range    WBC 16.8 (H) 4.4 - 11.3 x10*3/uL    nRBC 0.0 0.0 - 0.0 /100 WBCs    RBC 3.45 (L) 4.00 - 5.20 x10*6/uL    Hemoglobin 11.0 (L) 12.0 - 16.0 g/dL    Hematocrit 32.8 (L) 36.0 - 46.0 %    MCV 95 80 - 100 fL    MCH 31.9 26.0 - 34.0 pg    MCHC 33.5 32.0 - 36.0 g/dL    RDW 11.8 11.5 - 14.5 %    Platelets 240 150 - 450 x10*3/uL   Renal Function Panel   Result Value Ref Range    Glucose 136 (H) 74 - 99 mg/dL    Sodium 138 136 - 145 mmol/L    Potassium " 4.4 3.5 - 5.3 mmol/L    Chloride 103 98 - 107 mmol/L    Bicarbonate 23 21 - 32 mmol/L    Anion Gap 16 10 - 20 mmol/L    Urea Nitrogen 12 6 - 23 mg/dL    Creatinine 0.78 0.50 - 1.05 mg/dL    eGFR 89 >60 mL/min/1.73m*2    Calcium 8.8 8.6 - 10.6 mg/dL    Phosphorus 4.2 2.5 - 4.9 mg/dL    Albumin 4.4 3.4 - 5.0 g/dL   Magnesium   Result Value Ref Range    Magnesium 1.69 1.60 - 2.40 mg/dL       BI RAD breast exam specimen    Result Date: 2/21/2025  Interpreted By:  Juanjo Chua, STUDY: BI RAD BREAST EXAM SPECIMEN;  2/21/2025 9:20 am   ACCESSION NUMBER(S): [id number]   ORDERING CLINICIAN: PRITESH RAM   INDICATION: Right Breast Magseed localization.   ,R92.8 Other abnormal and inconclusive findings on diagnostic imaging of breast,N60.91 Unspecified benign mammary dysplasia of right breast   FINDINGS: The specimen radiograph demonstrates the Mag seed and residual calcifications.       Status post surgical excision of Right breast Magseed localization.   Findings were called to Dr. Ram in the OR at the time of interpretation.   MACRO: None   Signed by: Juanjo Chua 2/21/2025 5:24 PM Dictation workstation:   [id number]    NM injection only for sentinel node biopsy  no scan performed    Result Date: 2/21/2025  Interpreted By:  Eladia Claros and Ritchie Brandon STUDY: NM INJECTION ONLY FOR SENTINEL NODE BIOPSY  NO SCAN PERFORMED; 2/21/2025 8:26 am   INDICATION: Signs/Symptoms:intraoperative lymnphatic mapping for left axillary SLNB.   COMPARISON: None.   ACCESSION NUMBER(S): [id number]   ORDERING CLINICIAN: PRITESH RAM   TECHNIQUE: DIVISION OF NUCLEAR MEDICINE BREAST SENTINEL NODE LOCALIZATION   Syringes containing a total of 0.62 millicuries of Tc-99m tilmanocept (Lymphoseek) were delivered to the [facility] for injection and sentinel lymph node localization to be performed by the patient's attending breast surgeon at lymph node dissection/biopsy.   FINDINGS: Injection only, no images were acquired.       1. Breast  carcinoma undergoing peritumoral Tc-99m tilmanocept (Lymphoseek) injection for intraoperative sentinel lymph node localization.   I personally reviewed the images/study and I agree with the findings as stated.  This study was interpreted at [facility], [place], [place].   MACRO: None   Signed by: Eladia Claros 2/21/2025 10:01 AM Dictation workstation:   [id number]    BI US breast limited right    Result Date: 2/19/2025  Interpreted By:  Richy Farrell and Marshall Colin STUDY: BI MAMMO GUIDED BREAST RIGHT LOCALIZATION; BI US BREAST LIMITED RIGHT; 2/19/2025 3:09 pm; 2/19/2025 2:50 pm   ACCESSION NUMBER(S): [id number]; [id number]   ORDERING CLINICIAN: PRITESH RAM   INDICATION: Magseed localization of  for surgical planning.   ,R92.8 Other abnormal and inconclusive findings on diagnostic imaging of breast; ,N60.91 Unspecified benign mammary dysplasia of right breast   FINDINGS: Preprocedure ultrasound images of the right breast were obtained in attempt to locate the patient's biopsy tissue marker associated with the right breast atypia at the 9 o'clock position 3 cm from the nipple. Localization of the biopsy tissue marker under ultrasound guidance was unsuccessful. Therefore, decision was made the stereotactic localization of the biopsy tissue marker for Magseed placement as described below.   PREPROCEDURAL CONSULTATION: The history and physical exam pertinent to the procedure were reviewed and no updates were made.   The procedure was explained to the patient including the risks, benefits, and alternatives. Medications were discussed, including any prior use of blood thinning medications by the patient. Patient allergies were reviewed. The risks, including but not limited to infection and bleeding, were reviewed by the performing physician and the patient agreed to undergo the procedure.   Prior to the procedure, an audible timeout was done to verify patient identification, site  and type of procedure. Dr. Richy Farrell, Dr. Contreras Rios, a radiology nurse and a mammography technologist were present.   PROCEDURE: The right breast was marked and sterilely prepped. The heart-shaped Bard biopsy marker was identified in the central lateral quadrant at middle depth. 5 mL of buffered 1% lidocaine was injected subcutaneously and into the deeper tissues. A 12 cm needle containing a Magseed was advanced into the area of concern using a superior approach. The biopsy marker was localized using a Magseed. The patient tolerated the procedure without difficulty.   Additional images obtained after placement of the Magseed demonstrate satisfactory location of the Magseed in relation to the tissue marker.       Status post mammographic-guided Magseed localization of a right breast tissue marker associated with previously biopsied atypia.   POST PROCEDURE MAMMOGRAM FOR MARKER PLACEMENT.   MACRO: None   Signed by: Richy Farrell 2/19/2025 4:02 PM Dictation workstation:   [id number]    BI mammo guided breast right localization    Result Date: 2/19/2025  Interpreted By:  Richy Farrell and Marshall Colin STUDY: BI MAMMO GUIDED BREAST RIGHT LOCALIZATION; BI US BREAST LIMITED RIGHT; 2/19/2025 3:09 pm; 2/19/2025 2:50 pm   ACCESSION NUMBER(S): [id number]; [id number]   ORDERING CLINICIAN: PRITESH RAM   INDICATION: Magseed localization of  for surgical planning.   ,R92.8 Other abnormal and inconclusive findings on diagnostic imaging of breast; ,N60.91 Unspecified benign mammary dysplasia of right breast   FINDINGS: Preprocedure ultrasound images of the right breast were obtained in attempt to locate the patient's biopsy tissue marker associated with the right breast atypia at the 9 o'clock position 3 cm from the nipple. Localization of the biopsy tissue marker under ultrasound guidance was unsuccessful. Therefore, decision was made the stereotactic localization of the biopsy tissue marker for Magseed  placement as described below.   PREPROCEDURAL CONSULTATION: The history and physical exam pertinent to the procedure were reviewed and no updates were made.   The procedure was explained to the patient including the risks, benefits, and alternatives. Medications were discussed, including any prior use of blood thinning medications by the patient. Patient allergies were reviewed. The risks, including but not limited to infection and bleeding, were reviewed by the performing physician and the patient agreed to undergo the procedure.   Prior to the procedure, an audible timeout was done to verify patient identification, site and type of procedure. Dr. Richy Farrell, Dr. Contreras Rios, a radiology nurse and a mammography technologist were present.   PROCEDURE: The right breast was marked and sterilely prepped. The heart-shaped Bard biopsy marker was identified in the central lateral quadrant at middle depth. 5 mL of buffered 1% lidocaine was injected subcutaneously and into the deeper tissues. A 12 cm needle containing a Magseed was advanced into the area of concern using a superior approach. The biopsy marker was localized using a Magseed. The patient tolerated the procedure without difficulty.   Additional images obtained after placement of the Magseed demonstrate satisfactory location of the Magseed in relation to the tissue marker.       Status post mammographic-guided Magseed localization of a right breast tissue marker associated with previously biopsied atypia.   POST PROCEDURE MAMMOGRAM FOR MARKER PLACEMENT.   MACRO: None   Signed by: Richy Farrell 2/19/2025 4:02 PM Dictation workstation:   [id number]    Right mammo-guided breast localization    Result Date: 2/19/2025  Source Facility: [facility] Interpreted By:  Richy Farrell and Marshall Colin STUDY: BI MAMMO GUIDED BREAST RIGHT LOCALIZATION; BI US BREAST LIMITED RIGHT; 2/19/2025 3:09 pm; 2/19/2025 2:50 pm    ACCESSION NUMBER(S):  [id number]; [id number]    ORDERING CLINICIAN: PRITESH RAM    INDICATION: Magseed localization of  for surgical planning.    ,R92.8 Other abnormal and inconclusive findings on diagnostic imaging of breast; ,N60.91 Unspecified benign mammary dysplasia of right breast    FINDINGS: Preprocedure ultrasound images of the right breast were obtained in attempt to locate the patient's biopsy tissue marker associated with the right breast atypia at the 9 o'clock position 3 cm from the nipple. Localization of the biopsy tissue marker under ultrasound guidance was unsuccessful. Therefore, decision was made the stereotactic localization of the biopsy tissue marker for Magseed placement as described below.    PREPROCEDURAL CONSULTATION: The history and physical exam pertinent to the procedure were reviewed and no updates were made.    The procedure was explained to the patient including the risks, benefits, and alternatives. Medications were discussed, including any prior use of blood thinning medications by the patient. Patient allergies were reviewed. The risks, including but not limited to infection and bleeding, were reviewed by the performing physician and the patient agreed to undergo the procedure.    Prior to the procedure, an audible timeout was done to verify patient identification, site and type of procedure. Dr. Richy Farrell, Dr. Contreras Rios, a radiology nurse and a mammography technologist were present.    PROCEDURE: The right breast was marked and sterilely prepped. The heart-shaped Bard biopsy marker was identified in the central lateral quadrant at middle depth. 5 mL of buffered 1% lidocaine was injected subcutaneously and into the deeper tissues. A 12 cm needle containing a Magseed was advanced into the area of concern using a superior approach. The biopsy marker was localized using a Magseed. The patient tolerated the procedure without difficulty.    Additional images obtained after placement of the  Magseed demonstrate satisfactory location of the Magseed in relation to the tissue marker.    IMPRESSION: Status post mammographic-guided Magseed localization of a right breast tissue marker associated with previously biopsied atypia.    POST PROCEDURE MAMMOGRAM FOR MARKER PLACEMENT.    MACRO:  None    Signed by: Richy Farrell 2/19/2025 4:02 PM Dictation workstation:   [id number]    US BREAST RIGHT    Result Date: 2/19/2025  Source Facility: [facility] Interpreted By:  Richy Farrell and Marshall Colin STUDY: BI MAMMO GUIDED BREAST RIGHT LOCALIZATION; BI US BREAST LIMITED RIGHT; 2/19/2025 3:09 pm; 2/19/2025 2:50 pm    ACCESSION NUMBER(S): [id number]; [id number]    ORDERING CLINICIAN: PRITESH RAM    INDICATION: Magseed localization of  for surgical planning.    ,R92.8 Other abnormal and inconclusive findings on diagnostic imaging of breast; ,N60.91 Unspecified benign mammary dysplasia of right breast    FINDINGS: Preprocedure ultrasound images of the right breast were obtained in attempt to locate the patient's biopsy tissue marker associated with the right breast atypia at the 9 o'clock position 3 cm from the nipple. Localization of the biopsy tissue marker under ultrasound guidance was unsuccessful. Therefore, decision was made the stereotactic localization of the biopsy tissue marker for Magseed placement as described below.    PREPROCEDURAL CONSULTATION: The history and physical exam pertinent to the procedure were reviewed and no updates were made.    The procedure was explained to the patient including the risks, benefits, and alternatives. Medications were discussed, including any prior use of blood thinning medications by the patient. Patient allergies were reviewed. The risks, including but not limited to infection and bleeding, were reviewed by the performing physician and the patient agreed to undergo the procedure.    Prior to the procedure, an audible timeout was done to verify  patient identification, site and type of procedure. Dr. Richy Farrell, Dr. Contreras Rios, a radiology nurse and a mammography technologist were present.    PROCEDURE: The right breast was marked and sterilely prepped. The heart-shaped Bard biopsy marker was identified in the central lateral quadrant at middle depth. 5 mL of buffered 1% lidocaine was injected subcutaneously and into the deeper tissues. A 12 cm needle containing a Magseed was advanced into the area of concern using a superior approach. The biopsy marker was localized using a Magseed. The patient tolerated the procedure without difficulty.    Additional images obtained after placement of the Magseed demonstrate satisfactory location of the Magseed in relation to the tissue marker.    IMPRESSION: Status post mammographic-guided Magseed localization of a right breast tissue marker associated with previously biopsied atypia.    POST PROCEDURE MAMMOGRAM FOR MARKER PLACEMENT.    MACRO:  None    Signed by: Rihcy Farrell 2/19/2025 4:02 PM Dictation workstation:   [id number]     This patient has a urinary catheter   Reason for the urinary catheter remaining today? perioperative use for selected surgical procedures    Assessment/Plan   Assessment & Plan  Ductal carcinoma in situ (DCIS) of left breast    Atypical ductal hyperplasia of right breast    Breast cancer (Multi)      S/P Right magseed excisional biopsy, left skin or nipple sparing mastectomy, left intraoperative lymphatic mapping, axillary sentinel lymph node biopsy with Dr. Barnett and left breast TAMARA reconstruction:  A/P:   Flap surveillance:       - Continue serial flap assessments q1h per nursing with interval checks per plastic surgery team         -> Assess clinical appearance of flap (color, warmth, turgor) plus doppler pulse               signal at marked suture site          -> Check vioptix saturation level hourly (Must remain >30%)          -> Nursing to notify plastic surgery team  immediately if there are any acute changes              (Including decreased Doppler/Vioptix signal, pallor, temperature change, bleeding, etc.)      - Monitor surgical sites for s/s of bleeding, infection or dehiscence    Warming:       - Use of Apple hugger to bilateral breast flap region and surrounding skin overnight on POD0          ·  Settings: medium heat (38*C), medium continuous fan           ·  Avoid direct contact of warmer blanket with skin/flap          ·  DC Apple Hugger when able   Dressings/wound care:       - Dermabond to remain intact over breast flap incision lines, keep C/D/I       - Loose pink surgical bra       - Maintain Aquacel Ag covered by Tegaderm dressing at umbilical closure site      Drains:       - JUSTIN drain care to x 3, JUSTIN drains present at L breast and B/L lower abdomen         ·  Strip drain tubing TID and PRN        ·  Monitor and record output q8h         ·  Keep drain sites C/D/I, maintain CHG drain dressings         ·  Call plastics if drain output is >30cc in an hour or greater than 200cc over 8 hours        ·  Removal when output quantities <30 ml for 2 consecutive days    Vac therapy:       - Continue incisional wound vac therapy to lower transverse abdominal incision          ·  Settings: 125 mmHg low continuous suction          ·  Please keep dressing C/D/I, reinforce PRN          ·  Please alert plastics team with any concerns regarding vac          ·  Transition to Prevena portable vac device prior to DC, will remove on outpt follow up    Positioning:       - Patient to maintain beach chair positioning at all times          ·  Head elevated and hips flexed at least 30 degrees, knees bent and elevated on pillows       - Avoid positioning that would apply pressure to flap region or incisions    Weight bearing:       - Patient to maintain strict bedrest postoperatively on POD0, anticipate being OOB POD1       - <5 lbs lifting restriction to BUE postoperatively    Other  postoperative parameters/care:       - IV Ancef x3 doses postoperatively       - Maintain Rodríguez while on bedrest overnight on POD0 with anticipated removal on POD1 AM      - NPO until re-assessment on POD #1 AM          ·  NS @ 100 mL while NPO          ·  Urine output goal of >1 mL/kg/hr      - SQ Heparin q8h scheduled to start postoperatively, continue while inpatient        - Nursing to record strict I&Os      - VS monitoring q4h      - Encouraged use of IS (10x/hr, each hr while awake)       - Perioperative scopolamine patch removal 72hrs postoperatively      # Acute postoperative pain  - Nursing to assess patient pain scales at least q4h and PRN  - Continue current regimen (ERAS protocol) as follows:        ·   Scheduled Tylenol 975mg q8        ·   Gabapentin 300mg TID        ·   Celebrex 200mg BID       ·   Scheduled Toradol 30 mg Q8 for 5 doses, avoid narcotics  - Scheduled PO Colace 100 mg BID and PRN Milk of Mag for bowel regimen  - 4 mg IV Zofran q8h PRN nausea and/or vomiting 2/2 narcotic use      # Leukocytosis   - WBC count mildly elevated to 16.8 on postoperative labs   - Considered likely reactive 2/2 surgery   - Presently no s/s infection, VSS, on eduardo-op ancef   - Monitor for resolution on daily CBC  - VS q4h      #Acute on chronic anemia:  - Incoming HGB prior to admission 13.6  - H&H on postoperative labs 11.0/32.8  - Presently no S/S of bleeding, considered likely due to intraoperative blood loss   - Keep T&S updated, last 2/19  - Monitor for S/S of bleeding or symptomatic anemia   - Low transfusion threshold to ensure adequate flap perfusion  - Transfuse for HGB <7 per protocol   - Monitor on AM CBC      F: NS@ 100ml/hr while NPO overnight on POD0  E: Monitor and replete PRN       ·   Serum Mg decreased to 1.69 on postoperative labs, replaced with 2g IV post op       ·   Continue daily MV  N: NPO overnight on POD#0, anticipate transition to CLD on POD1 AM if flap exams stable   GI: Continue  bowel regimen with Colace BID and Milk of mag PRN        IV/PO Zofran PRN for nausea     DVT ppx:  - Risk score assessed  - SQ heparin q8h while inpatient, transition to Lovenox on POD#3  - Use of SCDs while on bedrest  - Anticipate early ambulation once off bedrest on POD1       Disposition: Continue care on RNF. Will remain inpatient for continued flap monitoring and vac/drain surveillance postoperatively.      Patient and plan discussed with Dr. Reyes.     TYRONE Modi-CNP  Plastic and Reconstructive Surgery   Available via [employer]  Pager #[phone]  Team phone: [phone]

## 2025-02-22 NOTE — ANESTHESIA POSTPROCEDURE EVALUATION
Patient: Ashley Jiang    Procedure Summary       Date: 02/21/25 Room / Location: [facility] OR 06 / [facility] OR    Anesthesia Start: 0714 Anesthesia Stop: 1902    Procedures:       right Magseed excisional biopsy (Right: Breast)      left skin or nipple sparing mastectomy (Left: Breast)      left intraoperative lymphatic mapping, axillary sentinel lymph node biopsy (Left)      RECONSTRUCTION, BREAST, USING TAMARA SKIN FLAP (Left: Breast) Diagnosis:       Ductal carcinoma in situ (DCIS) of left breast      Atypical ductal hyperplasia of right breast      (Ductal carcinoma in situ (DCIS) of left breast [D05.12])      (Atypical ductal hyperplasia of right breast [N60.91])    Surgeons: Charlette Barnett MD; Emilie Reyes MD Responsible Provider: Meliton Villafana MD    Anesthesia Type: general ASA Status: 2            Anesthesia Type: general    Vitals Value Taken Time   /65 02/21/25 1850   Temp 36 02/21/25 1902   Pulse 107 02/21/25 1857   Resp 14 02/21/25 1857   SpO2 95 % 02/21/25 1857   Vitals shown include unfiled device data.    Anesthesia Post Evaluation    Patient location during evaluation: PACU  Patient participation: complete - patient participated  Level of consciousness: awake  Pain management: adequate  Airway patency: patent  Cardiovascular status: acceptable  Respiratory status: acceptable  Hydration status: acceptable  Postoperative Nausea and Vomiting: none        No notable events documented.

## 2025-02-22 NOTE — ASSESSMENT & PLAN NOTE
S/P Right magseed excisional biopsy, left skin or nipple sparing mastectomy, left intraoperative lymphatic mapping, axillary sentinel lymph node biopsy with Dr. Barnett and left breast TAMARA reconstruction:  A/P:   Flap surveillance:       - Continue serial flap assessments q1h per nursing with interval checks per plastic surgery team         -> Assess clinical appearance of flap (color, warmth, turgor) plus doppler pulse               signal at marked suture site          -> Check vioptix saturation level hourly (Must remain >30%)          -> Nursing to notify plastic surgery team immediately if there are any acute changes              (Including decreased Doppler/Vioptix signal, pallor, temperature change, bleeding, etc.)      - Monitor surgical sites for s/s of bleeding, infection or dehiscence    Warming:       - Use of Apple hugger to bilateral breast flap region and surrounding skin overnight on POD0          ·  Settings: medium heat (38*C), medium continuous fan           ·  Avoid direct contact of warmer blanket with skin/flap          ·  DC Apple Hugger when able   Dressings/wound care:       - Dermabond to remain intact over breast flap incision lines, keep C/D/I       - Loose pink surgical bra       - Maintain Aquacel Ag covered by Tegaderm dressing at umbilical closure site      Drains:       - JUSTIN drain care to x 3, JUSTIN drains present at L breast and B/L lower abdomen         ·  Strip drain tubing TID and PRN        ·  Monitor and record output q8h         ·  Keep drain sites C/D/I, maintain CHG drain dressings         ·  Call plastics if drain output is >30cc in an hour or greater than 200cc over 8 hours        ·  Removal when output quantities <30 ml for 2 consecutive days    Vac therapy:       - Continue incisional wound vac therapy to lower transverse abdominal incision          ·  Settings: 125 mmHg low continuous suction          ·  Please keep dressing C/D/I, reinforce PRN          ·  Please alert  plastics team with any concerns regarding vac          ·  Transition to Prevena portable vac device prior to DC, will remove on outpt follow up    Positioning:       - Patient to maintain beach chair positioning at all times          ·  Head elevated and hips flexed at least 30 degrees, knees bent and elevated on pillows       - Avoid positioning that would apply pressure to flap region or incisions    Weight bearing:       - Patient OOB POD1 (2/22) order placed      - <5 lbs lifting restriction to BUE postoperatively    Other postoperative parameters/care:       - IV Ancef x3 doses postoperatively       - Remove humphreys on POD1 AM (2/22) order placed      - Full liquid diet ordered this morning, discontinue fluids          ·  Urine output goal of >1 mL/kg/hr      - SQ Heparin q8h scheduled to start postoperatively, continue while inpatient        - Nursing to record strict I&Os      - VS monitoring q4h; maintain SBP >110 to ensure adequate flap perfusion      - Encouraged use of IS (10x/hr, each hr while awake)       - Perioperative scopolamine patch removal 72hrs postoperatively      # Acute postoperative pain  - Nursing to assess patient pain scales at least q4h and PRN  - Continue current regimen (ERAS protocol) as follows:        ·   Scheduled Tylenol 975mg q8        ·   Gabapentin 300mg TID        ·   Celebrex 200mg BID (on hold)       ·   Scheduled Toradol 30 mg Q8 for 5 doses, avoid narcotics  - Scheduled PO Colace 100 mg BID and PRN Milk of Mag for bowel regimen  - 4 mg IV Zofran q8h PRN nausea and/or vomiting 2/2 narcotic use      # Leukocytosis, resolved  - WBC count mildly elevated to 16.8 on postoperative labs; Considered likely reactive 2/2 surgery     #Acute on chronic anemia:  - Incoming HGB prior to admission 13.6  - H&H on postoperative labs 11.0/32.8 and POD#1 9.7/30.4  - Presently no S/S of bleeding, considered likely due to intraoperative blood loss   - Keep T&S updated, last 2/19; ordered for AM  labs 2/23  - Monitor for S/S of bleeding or symptomatic anemia   - Low transfusion threshold to ensure adequate flap perfusion  - Transfuse for HGB <7 per protocol   - Monitor on AM CBC      F: none, enourage PO intake   E: Monitor and replete PRN       ·   Serum Mg decreased to 1.69 on postoperative labs, replaced with 2g IV post op, recheck mag tomorrow AM       ·   Continue daily MV  N: full liquid diet, possibly advance this evening  GI: Continue bowel regimen with Colace BID and Milk of mag PRN        IV/PO Zofran PRN for nausea     DVT ppx:  - Risk score assessed  - SQ heparin q8h while inpatient, transition to Lovenox on POD#3  - Use of SCDs while on bedrest  - early ambulation and OOB POD#1 (2/22)       Disposition: Continue care on RNF. Will remain inpatient for continued flap monitoring and vac/drain surveillance postoperatively.      Patient and plan discussed with Dr. Reyes.     KANWAL MattaC  Plastic and Reconstructive Surgery   Available via [employer]  Pager #[phone]  Team phone: [phone]

## 2025-02-22 NOTE — PROGRESS NOTES
Postop Pain HPI -   Palliative: relieved with IV analgesics and regional local anesthetics  Provocative: movement  Quality:  burning and aching  Radiation:  none  Severity:  4/10  Timing: constant    24-HOUR OPIOID CONSUMPTION:  No oxycodone, 1.2 mg dilaudid    Scheduled medications  acetaminophen, 975 mg, oral, q8h  aspirin, 81 mg, oral, Daily  bacitracin, , Topical, TID  ceFAZolin, 2 g, intravenous, q8h  [Held by provider] celecoxib, 200 mg, oral, BID  docusate sodium, 100 mg, oral, BID  gabapentin, 300 mg, oral, q8h LINDA  heparin (porcine), 5,000 Units, subcutaneous, q8h  methocarbamol, 500 mg, oral, q6h LINDA  nitroglycerin, 0.5 inch, transdermal, Daily  pantoprazole, 40 mg, oral, Daily before breakfast   Or  pantoprazole, 40 mg, intravenous, Daily before breakfast      Continuous medications  ropivacaine (PF) in NS cmpd, 10 mL/hr      PRN medications  PRN medications: magnesium hydroxide, ondansetron **OR** ondansetron     Physical Exam:  Constitutional:  no distress, alert and cooperative  Eyes: clear sclera  Head/Neck: No apparent injury, trachea midline  Respiratory/Thorax: Patent airways, thorax symmetric, breathing comfortably  Cardiovascular: no pitting edema  Gastrointestinal: Nondistended  Musculoskeletal: ROM intact  Extremities: no clubbing  Neurological: alert, louis x4  Psychological: Appropriate affect    Results for orders placed or performed during the hospital encounter of 02/21/25 (from the past 24 hours)   CBC   Result Value Ref Range    WBC 16.8 (H) 4.4 - 11.3 x10*3/uL    nRBC 0.0 0.0 - 0.0 /100 WBCs    RBC 3.45 (L) 4.00 - 5.20 x10*6/uL    Hemoglobin 11.0 (L) 12.0 - 16.0 g/dL    Hematocrit 32.8 (L) 36.0 - 46.0 %    MCV 95 80 - 100 fL    MCH 31.9 26.0 - 34.0 pg    MCHC 33.5 32.0 - 36.0 g/dL    RDW 11.8 11.5 - 14.5 %    Platelets 240 150 - 450 x10*3/uL   Renal Function Panel   Result Value Ref Range    Glucose 136 (H) 74 - 99 mg/dL    Sodium 138 136 - 145 mmol/L    Potassium 4.4 3.5 - 5.3 mmol/L     Chloride 103 98 - 107 mmol/L    Bicarbonate 23 21 - 32 mmol/L    Anion Gap 16 10 - 20 mmol/L    Urea Nitrogen 12 6 - 23 mg/dL    Creatinine 0.78 0.50 - 1.05 mg/dL    eGFR 89 >60 mL/min/1.73m*2    Calcium 8.8 8.6 - 10.6 mg/dL    Phosphorus 4.2 2.5 - 4.9 mg/dL    Albumin 4.4 3.4 - 5.0 g/dL   Magnesium   Result Value Ref Range    Magnesium 1.69 1.60 - 2.40 mg/dL   CBC and Auto Differential   Result Value Ref Range    WBC 8.6 4.4 - 11.3 x10*3/uL    nRBC 0.0 0.0 - 0.0 /100 WBCs    RBC 3.21 (L) 4.00 - 5.20 x10*6/uL    Hemoglobin 9.7 (L) 12.0 - 16.0 g/dL    Hematocrit 30.4 (L) 36.0 - 46.0 %    MCV 95 80 - 100 fL    MCH 30.2 26.0 - 34.0 pg    MCHC 31.9 (L) 32.0 - 36.0 g/dL    RDW 12.0 11.5 - 14.5 %    Platelets 232 150 - 450 x10*3/uL    Neutrophils % 73.6 40.0 - 80.0 %    Immature Granulocytes %, Automated 0.4 0.0 - 0.9 %    Lymphocytes % 15.0 13.0 - 44.0 %    Monocytes % 10.7 2.0 - 10.0 %    Eosinophils % 0.1 0.0 - 6.0 %    Basophils % 0.2 0.0 - 2.0 %    Neutrophils Absolute 6.30 1.20 - 7.70 x10*3/uL    Immature Granulocytes Absolute, Automated 0.03 0.00 - 0.70 x10*3/uL    Lymphocytes Absolute 1.28 1.20 - 4.80 x10*3/uL    Monocytes Absolute 0.92 0.10 - 1.00 x10*3/uL    Eosinophils Absolute 0.01 0.00 - 0.70 x10*3/uL    Basophils Absolute 0.02 0.00 - 0.10 x10*3/uL   Basic metabolic panel   Result Value Ref Range    Glucose 103 (H) 74 - 99 mg/dL    Sodium 142 136 - 145 mmol/L    Potassium 3.9 3.5 - 5.3 mmol/L    Chloride 107 98 - 107 mmol/L    Bicarbonate 28 21 - 32 mmol/L    Anion Gap 11 10 - 20 mmol/L    Urea Nitrogen 7 6 - 23 mg/dL    Creatinine 0.70 0.50 - 1.05 mg/dL    eGFR >90 >60 mL/min/1.73m*2    Calcium 8.8 8.6 - 10.6 mg/dL      Ashley Jiang is a 59 y.o. year old female patient who presents for Procedure(s):  right Magseed excisional biopsy  left skin or nipple sparing mastectomy  left intraoperative lymphatic mapping, axillary sentinel lymph node biopsy  RECONSTRUCTION, BREAST, USING TAMARA SKIN FLAP with Charlette SOTO  MD Anibal on 2/21/2025. Acute Pain consulted for assistance with pain control.      Plan:     - Left erector spinae block with catheter (T6) performed pre-operatively on 2/21/2025  - Bl paravertebral single shot nerve blocks performed at T9, T10, T11  - Ambit ball with Ropivacaine 0.2%/NaCl 0.9% 500mL, Rate 10 cc/hr unilaterally  - Ambit medication will not interfere with pain medication prescribed by the primary team.   - Please be aware of local anesthetic toxic dose and absorption variability before considering lidocaine patches  - Acute pain service will follow while catheters in place  - Rest of pain management per primary team     Acute Pain Resident  pg [phone] ph [phone]

## 2025-02-22 NOTE — BRIEF OP NOTE
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.12]     * Atypical ductal hyperplasia of right breast [N60.91]     Procedures  right Magseed excisional biopsy  90599 - IL EXC BREAST LES PREOP PLMT RAD MARKER OPEN 1 LES    left skin or nipple sparing mastectomy  60855 - IL MASTECTOMY SIMPLE COMPLETE    left intraoperative lymphatic mapping, axillary sentinel lymph node biopsy  24536 - IL BX/EXC LYMPH NODE OPEN DEEP AXILLARY NODE    RECONSTRUCTION, BREAST, USING TAMARA SKIN FLAP  51150 - IL BREAST RECONSTRUCTION W/FREE FLAP      Surgeons   Panel 1:     * Charlette Barnett - Primary  Panel 2:     * Emilie Reyes - Primary    Resident/Fellow/Other Assistant:  Smiley Langley [facility], PAARCHIE First Assist    Staff:   RNFA:   Circulator: Gianna  Circulator: Vignesh Thomas Person: Sarika Connollyub Person: Bing  Scrub Person: Bhavesh Redman Circulator: Concepcion Redman Circulator: Asad    Anesthesia Staff: Anesthesiologist: Bharati Lopez MD MPH; Meliton Villafana MD  CRNA: TYRONE Maldonado-PRESTON  C-AA: CARLOS Kimbrough; CARLOS Morales; CARLOS Martin  Frontline Breaker: CARLOS Kimbrough    Procedure Summary  Anesthesia: General  ASA: II  Estimated Blood Loss: 50 mL  Intra-op Medications:   Administrations occurring from 0650 to 1850 on 25:   Medication Name Total Dose   sodium chloride 0.9 % irrigation solution 1,000 mL   bacitracin ointment 1 Application   nitroglycerin (Deon-Bid) 2 % ointment 1 inch   acetaminophen (Tylenol) tablet 975 mg 975 mg   heparin (porcine) injection 5,000 Units 5,000 Units   acetaminophen (Ofirmev) injection 1,000 mg   albumin human 5 % 750 mL   ceFAZolin (Ancef) 1 g 6 g    dexAMETHasone (Decadron) injection 4 mg/mL 4 mg   fentaNYL (Sublimaze) injection 50 mcg/mL 100 mcg   glycopyrrolate (Robinul) injection 0.4 mg   heparin 5,000 units/mL 5,000 Units   HYDROmorphone (Dilaudid) injection 1 mg/mL 1.5 mg   indocyanine green (IC-Green) injection 25 mg 15 mg   LR bolus Cannot be calculated   LR bolus Cannot be calculated   lidocaine (Xylocaine) 2 % 70 mg   midazolam PF (Versed) injection 1 mg/mL 4 mg   ondansetron (Zofran) 2 mg/mL injection 4 mg   phenylephrine 100 mcg/mL syringe 10 mL (prefilled) 280 mcg   propofol (Diprivan) injection 10 mg/mL 649.1 mg   rocuronium 10 mg/mL 290 mg   ropivacaine PF (Naropin) 0.5 % - Vial 35 mL   sugammadex (Bridion) 200 mg/2 mL injection 200 mg   vecuronium (Norcuron) injection 10 mg 8 mg              Anesthesia Record               Intraprocedure I/O Totals          Intake    LR bolus 2000.00 mL    Total Intake 2000 mL       Output    Urine 470 mL    Total Output 470 mL       Net    Net Volume 1530 mL          Specimen:   ID Type Source Tests Collected by Time   1 : RIGHT MAGSEED EXCISIONAL BIOPSY AT 9:00 3 CM FN, SHORT STITCH SUPERIOR, LONG STITCH LATERAL Tissue BREAST LUMPECTOMY RIGHT SURGICAL PATHOLOGY EXAM Charlette Barnett MD 2/21/2025 0816   2 : LEFT SKIN SPARING MASTECTOMY, STITCH AT 12:00 Tissue BREAST MASTECTOMY LEFT SURGICAL PATHOLOGY EXAM Charlette Barnett MD 2/21/2025 0932   3 : LEFT BREAST SENTINEL LYMPH NODES Tissue SENTINEL LYMPH NODE BREAST LEFT SURGICAL PATHOLOGY EXAM Charlette Barnett MD 2/21/2025 0924                  Findings: See Operative Note    Complications:  None; patient tolerated the procedure well.     Disposition: PACU - hemodynamically stable.  Condition: stable  Specimens Collected:   ID Type Source Tests Collected by Time   1 : RIGHT MAGSEED EXCISIONAL BIOPSY AT 9:00 3 CM FN, SHORT STITCH SUPERIOR, LONG STITCH LATERAL Tissue BREAST LUMPECTOMY RIGHT SURGICAL PATHOLOGY EXAM Charlette Barnett MD 2/21/2025 0876   2 : LEFT SKIN SPARING  MASTECTOMY, STITCH AT 12:00 Tissue BREAST MASTECTOMY LEFT SURGICAL PATHOLOGY EXAM Charlette Barnett MD 2/21/2025 0932   3 : LEFT BREAST SENTINEL LYMPH NODES Tissue SENTINEL LYMPH NODE BREAST LEFT SURGICAL PATHOLOGY EXAM Charlette Barnett MD 2/21/2025 0935     Attending Attestation: A qualified resident physician was not available.    Charlette Barnett  Phone Number: [phone]

## 2025-02-23 VITALS
HEART RATE: 81 BPM | DIASTOLIC BLOOD PRESSURE: 64 MMHG | WEIGHT: 143.96 LBS | RESPIRATION RATE: 18 BRPM | BODY MASS INDEX: 23.14 KG/M2 | HEIGHT: 66 IN | TEMPERATURE: 97.9 F | SYSTOLIC BLOOD PRESSURE: 117 MMHG | OXYGEN SATURATION: 95 %

## 2025-02-23 LAB
ERYTHROCYTE [DISTWIDTH] IN BLOOD BY AUTOMATED COUNT: 12.4 % (ref 11.5–14.5)
HCT VFR BLD AUTO: 30.1 % (ref 36–46)
HGB BLD-MCNC: 9.9 G/DL (ref 12–16)
MAGNESIUM SERPL-MCNC: 2.29 MG/DL (ref 1.6–2.4)
MCH RBC QN AUTO: 31.5 PG (ref 26–34)
MCHC RBC AUTO-ENTMCNC: 32.9 G/DL (ref 32–36)
MCV RBC AUTO: 96 FL (ref 80–100)
NRBC BLD-RTO: 0 /100 WBCS (ref 0–0)
PLATELET # BLD AUTO: 254 X10*3/UL (ref 150–450)
RBC # BLD AUTO: 3.14 X10*6/UL (ref 4–5.2)
WBC # BLD AUTO: 7.7 X10*3/UL (ref 4.4–11.3)

## 2025-02-23 PROCEDURE — 1170000001 HC PRIVATE ONCOLOGY ROOM DAILY

## 2025-02-23 PROCEDURE — 36415 COLL VENOUS BLD VENIPUNCTURE: CPT | Performed by: PHYSICIAN ASSISTANT

## 2025-02-23 PROCEDURE — 99232 SBSQ HOSP IP/OBS MODERATE 35: CPT | Performed by: PHYSICIAN ASSISTANT

## 2025-02-23 PROCEDURE — 2500000001 HC RX 250 WO HCPCS SELF ADMINISTERED DRUGS (ALT 637 FOR MEDICARE OP): Performed by: NURSE PRACTITIONER

## 2025-02-23 PROCEDURE — 2500000005 HC RX 250 GENERAL PHARMACY W/O HCPCS: Performed by: NURSE PRACTITIONER

## 2025-02-23 PROCEDURE — 2500000001 HC RX 250 WO HCPCS SELF ADMINISTERED DRUGS (ALT 637 FOR MEDICARE OP): Performed by: PHYSICIAN ASSISTANT

## 2025-02-23 PROCEDURE — 99231 SBSQ HOSP IP/OBS SF/LOW 25: CPT | Performed by: STUDENT IN AN ORGANIZED HEALTH CARE EDUCATION/TRAINING PROGRAM

## 2025-02-23 PROCEDURE — 85027 COMPLETE CBC AUTOMATED: CPT | Performed by: PHYSICIAN ASSISTANT

## 2025-02-23 PROCEDURE — 2500000004 HC RX 250 GENERAL PHARMACY W/ HCPCS (ALT 636 FOR OP/ED): Performed by: PHYSICIAN ASSISTANT

## 2025-02-23 PROCEDURE — 2500000004 HC RX 250 GENERAL PHARMACY W/ HCPCS (ALT 636 FOR OP/ED): Performed by: NURSE PRACTITIONER

## 2025-02-23 PROCEDURE — 2500000005 HC RX 250 GENERAL PHARMACY W/O HCPCS: Performed by: PHYSICIAN ASSISTANT

## 2025-02-23 PROCEDURE — 83735 ASSAY OF MAGNESIUM: CPT | Performed by: PHYSICIAN ASSISTANT

## 2025-02-23 RX ORDER — BACITRACIN ZINC 500 UNIT/G
OINTMENT IN PACKET (EA) TOPICAL DAILY
Qty: 7 PACKET | Refills: 0 | OUTPATIENT
Start: 2025-02-23 | End: 2025-03-02

## 2025-02-23 RX ORDER — GABAPENTIN 100 MG/1
100 CAPSULE ORAL EVERY 8 HOURS
Qty: 21 CAPSULE | Refills: 0 | OUTPATIENT
Start: 2025-02-24 | End: 2025-03-03

## 2025-02-23 RX ORDER — ACETAMINOPHEN 325 MG/1
975 TABLET ORAL EVERY 8 HOURS
Qty: 270 TABLET | Refills: 0 | OUTPATIENT
Start: 2025-02-24 | End: 2025-03-26

## 2025-02-23 RX ORDER — DOCUSATE SODIUM 100 MG/1
100 CAPSULE, LIQUID FILLED ORAL 2 TIMES DAILY
Qty: 14 CAPSULE | Refills: 0 | OUTPATIENT
Start: 2025-02-24 | End: 2025-03-03

## 2025-02-23 RX ORDER — CELECOXIB 200 MG/1
200 CAPSULE ORAL 2 TIMES DAILY
Qty: 60 CAPSULE | Refills: 0 | OUTPATIENT
Start: 2025-02-24 | End: 2025-03-26

## 2025-02-23 RX ORDER — METHOCARBAMOL 500 MG/1
500 TABLET, FILM COATED ORAL EVERY 6 HOURS SCHEDULED
Qty: 28 TABLET | Refills: 0 | OUTPATIENT
Start: 2025-02-24 | End: 2025-03-03

## 2025-02-23 RX ADMIN — BACITRACIN 1 APPLICATION: 500 OINTMENT TOPICAL at 08:00

## 2025-02-23 RX ADMIN — GABAPENTIN 100 MG: 100 CAPSULE ORAL at 15:53

## 2025-02-23 RX ADMIN — CELECOXIB 200 MG: 200 CAPSULE ORAL at 08:01

## 2025-02-23 RX ADMIN — METHOCARBAMOL 500 MG: 500 TABLET ORAL at 06:24

## 2025-02-23 RX ADMIN — ACETAMINOPHEN 975 MG: 325 TABLET ORAL at 00:09

## 2025-02-23 RX ADMIN — DOCUSATE SODIUM 100 MG: 100 CAPSULE, LIQUID FILLED ORAL at 21:02

## 2025-02-23 RX ADMIN — NITROGLYCERIN 0.5 INCH: 20 OINTMENT TOPICAL at 09:51

## 2025-02-23 RX ADMIN — ACETAMINOPHEN 975 MG: 325 TABLET ORAL at 15:53

## 2025-02-23 RX ADMIN — POLYETHYLENE GLYCOL 3350 17 G: 17 POWDER, FOR SOLUTION ORAL at 08:05

## 2025-02-23 RX ADMIN — CELECOXIB 200 MG: 200 CAPSULE ORAL at 21:02

## 2025-02-23 RX ADMIN — HEPARIN SODIUM 5000 UNITS: 5000 INJECTION, SOLUTION INTRAVENOUS; SUBCUTANEOUS at 15:52

## 2025-02-23 RX ADMIN — METHOCARBAMOL 500 MG: 500 TABLET ORAL at 00:09

## 2025-02-23 RX ADMIN — METHOCARBAMOL 500 MG: 500 TABLET ORAL at 11:50

## 2025-02-23 RX ADMIN — DOCUSATE SODIUM 100 MG: 100 CAPSULE, LIQUID FILLED ORAL at 08:00

## 2025-02-23 RX ADMIN — ACETAMINOPHEN 975 MG: 325 TABLET ORAL at 08:00

## 2025-02-23 RX ADMIN — GABAPENTIN 100 MG: 100 CAPSULE ORAL at 00:09

## 2025-02-23 RX ADMIN — PANTOPRAZOLE SODIUM 40 MG: 40 TABLET, DELAYED RELEASE ORAL at 06:24

## 2025-02-23 RX ADMIN — HEPARIN SODIUM 5000 UNITS: 5000 INJECTION, SOLUTION INTRAVENOUS; SUBCUTANEOUS at 08:01

## 2025-02-23 RX ADMIN — HEPARIN SODIUM 5000 UNITS: 5000 INJECTION, SOLUTION INTRAVENOUS; SUBCUTANEOUS at 00:07

## 2025-02-23 RX ADMIN — METHOCARBAMOL 500 MG: 500 TABLET ORAL at 18:11

## 2025-02-23 RX ADMIN — ASPIRIN 81 MG: 81 TABLET, COATED ORAL at 08:00

## 2025-02-23 RX ADMIN — GABAPENTIN 100 MG: 100 CAPSULE ORAL at 08:01

## 2025-02-23 ASSESSMENT — COGNITIVE AND FUNCTIONAL STATUS - GENERAL
DAILY ACTIVITIY SCORE: 19
HELP NEEDED FOR BATHING: A LITTLE
DRESSING REGULAR LOWER BODY CLOTHING: A LITTLE
WALKING IN HOSPITAL ROOM: A LITTLE
CLIMB 3 TO 5 STEPS WITH RAILING: A LITTLE
TOILETING: A LITTLE
STANDING UP FROM CHAIR USING ARMS: A LITTLE
MOVING TO AND FROM BED TO CHAIR: A LITTLE
DRESSING REGULAR LOWER BODY CLOTHING: A LITTLE
DRESSING REGULAR UPPER BODY CLOTHING: A LITTLE
TOILETING: A LITTLE
MOBILITY SCORE: 19
DRESSING REGULAR UPPER BODY CLOTHING: A LITTLE
WALKING IN HOSPITAL ROOM: A LITTLE
HELP NEEDED FOR BATHING: A LITTLE
MOBILITY SCORE: 22
CLIMB 3 TO 5 STEPS WITH RAILING: A LITTLE
TURNING FROM BACK TO SIDE WHILE IN FLAT BAD: A LITTLE
PERSONAL GROOMING: A LITTLE
DAILY ACTIVITIY SCORE: 20

## 2025-02-23 ASSESSMENT — PAIN SCALES - GENERAL
PAINLEVEL_OUTOF10: 0 - NO PAIN
PAINLEVEL_OUTOF10: 0 - NO PAIN
PAINLEVEL_OUTOF10: 3

## 2025-02-23 ASSESSMENT — PAIN - FUNCTIONAL ASSESSMENT
PAIN_FUNCTIONAL_ASSESSMENT: 0-10

## 2025-02-23 NOTE — HOSPITAL COURSE
BRIEF HISTORY:    Ashley Jiang is a 59 y.o. female with a PMH of left breast DCIS and right breast ADH who is now s/p right magseed excisional biopsy, left skin or nipple sparing mastectomy, left intraoperative lymphatic mapping, axillary sentinel lymph node biopsy with Dr. Barnett and left breast TAMARA reconstruction with Dr. Reyes.     HOSPITAL COURSE:    Admitted post operatively for flap monitoring, bedrest and pain control. Rodríguez was removed on POD#1 and voided without difficulty, able to ambulate with assistance and Tolerating Regular diet. L lower arm and hand numbness resolved POD#2.     DAY OF DISCHARGE:    On the day of discharge, the patient was seen and evaluated by the Plastic Surgery team and deemed suitable for discharge to ***.  There were no significant events overnight. Vitals were reviewed and within normal limits. Labs were stable at discharge. On day of discharge the patient was tolerating a diet, pain was controlled on PO pain medication, was ambulating well and voiding spontaneously. The patient was given detailed discharge instructions and were scheduled to follow up as an outpatient.     movement on POD2 and decision was made to continue to monitor overnight until POD3. Patient was able to have BM x2 on POD3 AM.     Her incisions were monitored and remained well-approximated without signs of infection or dehiscence. The abdominal donor site was assessed regularly, and no concerns were noted. On the day of discharge, her abdominal incisional dressing was connected to a portable incisional wound vac device, which was maintaining seal but triggered periodic obstruction alarms despite no identifiable blockage. She was evaluated by the wound care team without identifiable cause.  The decision was made to discharge her with the wound vac in place, advising her to maintain it as tolerated as long as suction was not lost.    DAY OF DISCHARGE:    The patient met all discharge criteria, including stable vital signs, pain control with oral medications, and independent mobility. She was discharged home in stable condition with instructions for drain care, wound vac management, activity restrictions, and scheduled follow-up in the outpatient clinic.

## 2025-02-23 NOTE — PROGRESS NOTES
Postop Pain HPI -   Palliative: relieved with IV analgesics and regional local anesthetics  Provocative: movement  Quality:  burning and aching  Radiation:  none  Severity:  2/10  Timing: constant    24-HOUR OPIOID CONSUMPTION:  No oxycodone, no dilaudid    Scheduled medications  acetaminophen, 975 mg, oral, q8h  aspirin, 81 mg, oral, Daily  bacitracin, , Topical, TID  celecoxib, 200 mg, oral, BID  docusate sodium, 100 mg, oral, BID  gabapentin, 100 mg, oral, q8h  heparin (porcine), 5,000 Units, subcutaneous, q8h  methocarbamol, 500 mg, oral, q6h LINDA  nitroglycerin, 0.5 inch, transdermal, Daily  pantoprazole, 40 mg, oral, Daily before breakfast   Or  pantoprazole, 40 mg, intravenous, Daily before breakfast  polyethylene glycol, 17 g, oral, Daily      Continuous medications  ropivacaine (PF) in NS cmpd, 10 mL/hr      PRN medications  PRN medications: magnesium hydroxide, ondansetron **OR** ondansetron     Physical Exam:  Constitutional:  no distress, alert and cooperative  Eyes: clear sclera  Head/Neck: No apparent injury, trachea midline  Respiratory/Thorax: Patent airways, thorax symmetric, breathing comfortably  Cardiovascular: no pitting edema  Gastrointestinal: Nondistended  Musculoskeletal: ROM intact  Extremities: no clubbing  Neurological: alert, louis x4  Psychological: Appropriate affect    Results for orders placed or performed during the hospital encounter of 02/21/25 (from the past 24 hours)   CBC   Result Value Ref Range    WBC 7.7 4.4 - 11.3 x10*3/uL    nRBC 0.0 0.0 - 0.0 /100 WBCs    RBC 3.14 (L) 4.00 - 5.20 x10*6/uL    Hemoglobin 9.9 (L) 12.0 - 16.0 g/dL    Hematocrit 30.1 (L) 36.0 - 46.0 %    MCV 96 80 - 100 fL    MCH 31.5 26.0 - 34.0 pg    MCHC 32.9 32.0 - 36.0 g/dL    RDW 12.4 11.5 - 14.5 %    Platelets 254 150 - 450 x10*3/uL   Magnesium   Result Value Ref Range    Magnesium 2.29 1.60 - 2.40 mg/dL      Ashley Jiang is a 59 y.o. year old female patient who presents for Procedure(s):  right Magseed  excisional biopsy  left skin or nipple sparing mastectomy  left intraoperative lymphatic mapping, axillary sentinel lymph node biopsy  RECONSTRUCTION, BREAST, USING TAMARA SKIN FLAP with Charlette Barnett MD on 2/21/2025. Acute Pain consulted for assistance with pain control.      Plan:     - Left erector spinae block with catheter (T6) performed pre-operatively on 2/21/2025  - Bl paravertebral single shot nerve blocks performed at T9, T10, T11  - Will remove L VÍCTOR catheter at noon today  - Acute pain service will sign off once catheter is removed  - Rest of pain management per primary team     Acute Pain Resident  pg [phone] ph [phone]

## 2025-02-23 NOTE — SIGNIFICANT EVENT
"  Department of Plastic and Reconstructive Surgery  PM Check    59 year old female with PMH of left breast DCIS and right breast ADH who is now s/p right magseed excisional biopsy, left skin or nipple sparing mastectomy, left intraoperative lymphatic mapping, axillary sentinel lymph node biopsy with Dr. Barnett and left breast TAMARA reconstruction with Dr. Reyes.      Subjective  Resting comfortably in bed, pain well controlled with Gabapentin and Celebrex, numbness and tingling to  left hand feels about the same but not worse, Tolerating Regular diet, ambulating with assistance, passing gas, no BM. Denies any fever, chills, night sweats, CP, SOB, palpitations, nausea, vomiting, diarrhea, constipation, dysuria, hematuria, hematochezia, hematemesis, flank pain.       Objective  /63 (BP Location: Right arm, Patient Position: Lying)   Pulse 83   Temp 36.4 °C (97.5 °F) (Temporal)   Resp 18   Ht 1.676 m (5' 5.98\")   Wt 65.3 kg (143 lb 15.4 oz)   SpO2 95%   BMI 23.25 kg/m²      Physical Exam  Constitutional: A&Ox3, calm and cooperative, NAD.  Eyes: PERRL, EOMI  ENMT: Moist mucous membranes, no apparent injuries or lesions.  Head/Neck: NC/AT.   Cardiovascular: Normal rate and regular rhythm. 2+ equal pulses of the distal extremities.  Respiratory/Thorax: CTAB, regular respirations on RA. Good symmetric chest expansion.   Gastrointestinal: Abdomen soft, appropriately tender, no peritoneal signs, +BS x 4, no BM, Incisional wound vac to lower abdominal incision without alarms for leak or malfunction. Holding appropriate suction at -125mmHg. Tissue surrounding vac dressing without erythema or edema. Aquacel AG covering umbilicus, dried sanguinous drainage on dressing, JUSTIN drain x 2 to bilateral abdomen with ss output. Abdominal binder in place.  TAMARA free flap recipient site to left breast which is appropriate color, pale but same color as native tissue, without pallor. Warm to touch, soft. Incision lines " circumferentially around flap, well approximated with sutures and dermabond, no evidence of bleeding or dehiscence. Intact venous Doppler pulse at marked site of flap tissue. Appropriately tender to palpation at flap and surrounding tissue. 2+ Cap refill at the flap. No areas of surrounding edema, fluid collection, induration, drainage or bleeding. x1 JUSTIN drain to left breast with ss output. Surgical bra in place, nerve block in place  Genitourinary: voiding without difficulty post humphreys removal  Extremities: No peripheral edema. Moving all 4 extremities actively.   Neurological: A&Ox3.   Psychological: Appropriate mood and behavior.    S/P Right magseed excisional biopsy, left skin or nipple sparing mastectomy, left intraoperative lymphatic mapping, axillary sentinel lymph node biopsy with Dr. Barnett and left breast TAMARA reconstruction:  A/P:  - doing well post operatively   - Flap check stable, no change from prior assessment, biphasic doppler signal, good strength, warm to touch  - continue flap checks  - all other care per daily progress note  - updated TYRONE Queen-CNP  Plastic and Reconstructive Surgery   Available via [employer]  Pager #[phone]  Team phone: [phone]

## 2025-02-23 NOTE — PROGRESS NOTES
Department of Plastic and Reconstructive Surgery  Progress Note    59 year old female with PMH of left breast DCIS and right breast ADH who is now s/p right magseed excisional biopsy, left skin or nipple sparing mastectomy, left intraoperative lymphatic mapping, axillary sentinel lymph node biopsy with Dr. Barnett and left breast TAMARA reconstruction with Dr. Reyes.    Subjective   Resting comfortably in bed with family at bedside this morning, pain well controlled, states 2/10.  left lower arm and 4 finger numbness sensation resolved. Denies any fever, chills, night sweats, CP, SOB, palpitations, nausea, vomiting, diarrhea, constipation, dysuria, hematuria, hematochezia, hematemesis, flank pain.     Objective     Physical Exam  Constitutional: A&Ox3, calm and cooperative, NAD.  Eyes: PERRL, EOMI  ENMT: Moist mucous membranes, no apparent injuries or lesions.  Head/Neck: NC/AT.   Cardiovascular: Normal rate and regular rhythm. 2+ equal pulses of the distal extremities.  Respiratory/Thorax: CTAB, regular respirations. Good symmetric chest expansion.   Gastrointestinal: Abdomen soft, appropriately tender, no peritoneal signs, +BS x 4, no BM, Incisional wound vac to lower abdominal incision without alarms for leak or malfunction. Holding appropriate suction at -125mmHg. Tissue surrounding vac dressing without erythema or edema. Aquacel AG covering umbilicus, dried sanguinous drainage on dressing, JUSTIN drain x 2 to bilateral abdomen with ss output. Abdominal binder in place.  TAMARA free flap recipient site to left breast which is appropriate color, pale but same color as native tissue, without pallor. Warm to touch, soft. Incision lines circumferentially around flap, well approximated with sutures and dermabond, no evidence of bleeding or dehiscence. Intact venous Doppler pulse at marked site of flap tissue. Appropriately tender to palpation at flap and surrounding tissue. 2+ Cap refill at the flap. No areas of surrounding  "edema, fluid collection, induration, drainage or bleeding. x1 JUSTIN drain to left breast with ss output. Surgical bra in place, nerve block in place  Genitourinary: indwelling humphreys in place with pale green yellow urine output from ICG, humphreys to be removed today  Extremities: No peripheral edema. Moving all 4 extremities actively.   Neurological: A&Ox3.   Psychological: Appropriate mood and behavior.      Last Recorded Vitals  Blood pressure 112/72, pulse 72, temperature 36.2 °C (97.2 °F), resp. rate 18, height 1.676 m (5' 5.98\"), weight 65.3 kg (143 lb 15.4 oz), SpO2 95%.  Intake/Output last 3 Shifts:  I/O last 3 completed shifts:  In: 3415 (52.3 mL/kg) [P.O.:1700; I.V.:1415 (21.7 mL/kg); IV Piggyback:300]  Out: 6302 (96.5 mL/kg) [Urine:5860 (2.5 mL/kg/hr); Drains:442]  Weight: 65.3 kg     Relevant Results  Scheduled medications  acetaminophen, 975 mg, oral, q8h  aspirin, 81 mg, oral, Daily  bacitracin, , Topical, TID  celecoxib, 200 mg, oral, BID  docusate sodium, 100 mg, oral, BID  gabapentin, 100 mg, oral, q8h  heparin (porcine), 5,000 Units, subcutaneous, q8h  methocarbamol, 500 mg, oral, q6h LINDA  nitroglycerin, 0.5 inch, transdermal, Daily  pantoprazole, 40 mg, oral, Daily before breakfast   Or  pantoprazole, 40 mg, intravenous, Daily before breakfast  polyethylene glycol, 17 g, oral, Daily      Continuous medications  ropivacaine (PF) in NS cmpd, 10 mL/hr      PRN medications  PRN medications: magnesium hydroxide, ondansetron **OR** ondansetron    Results for orders placed or performed during the hospital encounter of 02/21/25 (from the past 24 hours)   CBC   Result Value Ref Range    WBC 7.7 4.4 - 11.3 x10*3/uL    nRBC 0.0 0.0 - 0.0 /100 WBCs    RBC 3.14 (L) 4.00 - 5.20 x10*6/uL    Hemoglobin 9.9 (L) 12.0 - 16.0 g/dL    Hematocrit 30.1 (L) 36.0 - 46.0 %    MCV 96 80 - 100 fL    MCH 31.5 26.0 - 34.0 pg    MCHC 32.9 32.0 - 36.0 g/dL    RDW 12.4 11.5 - 14.5 %    Platelets 254 150 - 450 x10*3/uL   Magnesium "   Result Value Ref Range    Magnesium 2.29 1.60 - 2.40 mg/dL       BI RAD breast exam specimen    Result Date: 2/21/2025  Interpreted By:  Juanjo Chua, STUDY: BI RAD BREAST EXAM SPECIMEN;  2/21/2025 9:20 am   ACCESSION NUMBER(S): [id number]   ORDERING CLINICIAN: PRITESH RAM   INDICATION: Right Breast Magseed localization.   ,R92.8 Other abnormal and inconclusive findings on diagnostic imaging of breast,N60.91 Unspecified benign mammary dysplasia of right breast   FINDINGS: The specimen radiograph demonstrates the Mag seed and residual calcifications.       Status post surgical excision of Right breast Magseed localization.   Findings were called to Dr. Ram in the OR at the time of interpretation.   MACRO: None   Signed by: Juanjo Chua 2/21/2025 5:24 PM Dictation workstation:   [id number]    NM injection only for sentinel node biopsy  no scan performed    Result Date: 2/21/2025  Interpreted By:  Eladia Claros and Ritchie Brandon STUDY: NM INJECTION ONLY FOR SENTINEL NODE BIOPSY  NO SCAN PERFORMED; 2/21/2025 8:26 am   INDICATION: Signs/Symptoms:intraoperative lymnphatic mapping for left axillary SLNB.   COMPARISON: None.   ACCESSION NUMBER(S): [id number]   ORDERING CLINICIAN: PRITESH RAM   TECHNIQUE: DIVISION OF NUCLEAR MEDICINE BREAST SENTINEL NODE LOCALIZATION   Syringes containing a total of 0.62 millicuries of Tc-99m tilmanocept (Lymphoseek) were delivered to the [facility] for injection and sentinel lymph node localization to be performed by the patient's attending breast surgeon at lymph node dissection/biopsy.   FINDINGS: Injection only, no images were acquired.       1. Breast carcinoma undergoing peritumoral Tc-99m tilmanocept (Lymphoseek) injection for intraoperative sentinel lymph node localization.   I personally reviewed the images/study and I agree with the findings as stated.  This study was interpreted at [facility], [place], [place].   MACRO: None   Signed  by: Eladia Claros 2/21/2025 10:01 AM Dictation workstation:   [id number]    BI US breast limited right    Result Date: 2/19/2025  Interpreted By:  Richy Farrell and Marshall Colin STUDY: BI MAMMO GUIDED BREAST RIGHT LOCALIZATION; BI US BREAST LIMITED RIGHT; 2/19/2025 3:09 pm; 2/19/2025 2:50 pm   ACCESSION NUMBER(S): [id number]; [id number]   ORDERING CLINICIAN: PRITESH RAM   INDICATION: Magseed localization of  for surgical planning.   ,R92.8 Other abnormal and inconclusive findings on diagnostic imaging of breast; ,N60.91 Unspecified benign mammary dysplasia of right breast   FINDINGS: Preprocedure ultrasound images of the right breast were obtained in attempt to locate the patient's biopsy tissue marker associated with the right breast atypia at the 9 o'clock position 3 cm from the nipple. Localization of the biopsy tissue marker under ultrasound guidance was unsuccessful. Therefore, decision was made the stereotactic localization of the biopsy tissue marker for Magseed placement as described below.   PREPROCEDURAL CONSULTATION: The history and physical exam pertinent to the procedure were reviewed and no updates were made.   The procedure was explained to the patient including the risks, benefits, and alternatives. Medications were discussed, including any prior use of blood thinning medications by the patient. Patient allergies were reviewed. The risks, including but not limited to infection and bleeding, were reviewed by the performing physician and the patient agreed to undergo the procedure.   Prior to the procedure, an audible timeout was done to verify patient identification, site and type of procedure. Dr. Richy Farrell, Dr. Contreras Rios, a radiology nurse and a mammography technologist were present.   PROCEDURE: The right breast was marked and sterilely prepped. The heart-shaped Bard biopsy marker was identified in the central lateral quadrant at middle depth. 5 mL of buffered 1% lidocaine was  injected subcutaneously and into the deeper tissues. A 12 cm needle containing a Magseed was advanced into the area of concern using a superior approach. The biopsy marker was localized using a Magseed. The patient tolerated the procedure without difficulty.   Additional images obtained after placement of the Magseed demonstrate satisfactory location of the Magseed in relation to the tissue marker.       Status post mammographic-guided Magseed localization of a right breast tissue marker associated with previously biopsied atypia.   POST PROCEDURE MAMMOGRAM FOR MARKER PLACEMENT.   MACRO: None   Signed by: Richy Farrell 2/19/2025 4:02 PM Dictation workstation:   [id number]    BI mammo guided breast right localization    Result Date: 2/19/2025  Interpreted By:  Richy Farrell and Marshall Colin STUDY: BI MAMMO GUIDED BREAST RIGHT LOCALIZATION; BI US BREAST LIMITED RIGHT; 2/19/2025 3:09 pm; 2/19/2025 2:50 pm   ACCESSION NUMBER(S): [id number]; [id number]   ORDERING CLINICIAN: PRITESH RAM   INDICATION: Magseed localization of  for surgical planning.   ,R92.8 Other abnormal and inconclusive findings on diagnostic imaging of breast; ,N60.91 Unspecified benign mammary dysplasia of right breast   FINDINGS: Preprocedure ultrasound images of the right breast were obtained in attempt to locate the patient's biopsy tissue marker associated with the right breast atypia at the 9 o'clock position 3 cm from the nipple. Localization of the biopsy tissue marker under ultrasound guidance was unsuccessful. Therefore, decision was made the stereotactic localization of the biopsy tissue marker for Magseed placement as described below.   PREPROCEDURAL CONSULTATION: The history and physical exam pertinent to the procedure were reviewed and no updates were made.   The procedure was explained to the patient including the risks, benefits, and alternatives. Medications were discussed, including any prior use of blood thinning  medications by the patient. Patient allergies were reviewed. The risks, including but not limited to infection and bleeding, were reviewed by the performing physician and the patient agreed to undergo the procedure.   Prior to the procedure, an audible timeout was done to verify patient identification, site and type of procedure. Dr. Richy Farrell, Dr. Contreras Rios, a radiology nurse and a mammography technologist were present.   PROCEDURE: The right breast was marked and sterilely prepped. The heart-shaped Bard biopsy marker was identified in the central lateral quadrant at middle depth. 5 mL of buffered 1% lidocaine was injected subcutaneously and into the deeper tissues. A 12 cm needle containing a Magseed was advanced into the area of concern using a superior approach. The biopsy marker was localized using a Magseed. The patient tolerated the procedure without difficulty.   Additional images obtained after placement of the Magseed demonstrate satisfactory location of the Magseed in relation to the tissue marker.       Status post mammographic-guided Magseed localization of a right breast tissue marker associated with previously biopsied atypia.   POST PROCEDURE MAMMOGRAM FOR MARKER PLACEMENT.   MACRO: None   Signed by: Richy Farrell 2/19/2025 4:02 PM Dictation workstation:   [id number]    Right mammo-guided breast localization    Result Date: 2/19/2025  Source Facility: [facility] Interpreted By:  Richy Farrell and Marshall Colin STUDY: BI MAMMO GUIDED BREAST RIGHT LOCALIZATION; BI US BREAST LIMITED RIGHT; 2/19/2025 3:09 pm; 2/19/2025 2:50 pm    ACCESSION NUMBER(S): [id number]; [id number]    ORDERING CLINICIAN: PRITESH RAM    INDICATION: Magseed localization of  for surgical planning.    ,R92.8 Other abnormal and inconclusive findings on diagnostic imaging of breast; ,N60.91 Unspecified benign mammary dysplasia of right breast    FINDINGS: Preprocedure ultrasound images of the right  breast were obtained in attempt to locate the patient's biopsy tissue marker associated with the right breast atypia at the 9 o'clock position 3 cm from the nipple. Localization of the biopsy tissue marker under ultrasound guidance was unsuccessful. Therefore, decision was made the stereotactic localization of the biopsy tissue marker for Magseed placement as described below.    PREPROCEDURAL CONSULTATION: The history and physical exam pertinent to the procedure were reviewed and no updates were made.    The procedure was explained to the patient including the risks, benefits, and alternatives. Medications were discussed, including any prior use of blood thinning medications by the patient. Patient allergies were reviewed. The risks, including but not limited to infection and bleeding, were reviewed by the performing physician and the patient agreed to undergo the procedure.    Prior to the procedure, an audible timeout was done to verify patient identification, site and type of procedure. Dr. Richy Farrell, Dr. Contreras Rios, a radiology nurse and a mammography technologist were present.    PROCEDURE: The right breast was marked and sterilely prepped. The heart-shaped Bard biopsy marker was identified in the central lateral quadrant at middle depth. 5 mL of buffered 1% lidocaine was injected subcutaneously and into the deeper tissues. A 12 cm needle containing a Magseed was advanced into the area of concern using a superior approach. The biopsy marker was localized using a Magseed. The patient tolerated the procedure without difficulty.    Additional images obtained after placement of the Magseed demonstrate satisfactory location of the Magseed in relation to the tissue marker.    IMPRESSION: Status post mammographic-guided Magseed localization of a right breast tissue marker associated with previously biopsied atypia.    POST PROCEDURE MAMMOGRAM FOR MARKER PLACEMENT.    MACRO:  None    Signed by: Richy  Jami 2/19/2025 4:02 PM Dictation workstation:   [id number]    US BREAST RIGHT    Result Date: 2/19/2025  Source Facility: [facility] Interpreted By:  Richy Farrell and Marshall Colin STUDY: BI MAMMO GUIDED BREAST RIGHT LOCALIZATION; BI US BREAST LIMITED RIGHT; 2/19/2025 3:09 pm; 2/19/2025 2:50 pm    ACCESSION NUMBER(S): [id number]; [id number]    ORDERING CLINICIAN: PRITESH RAM    INDICATION: Magseed localization of  for surgical planning.    ,R92.8 Other abnormal and inconclusive findings on diagnostic imaging of breast; ,N60.91 Unspecified benign mammary dysplasia of right breast    FINDINGS: Preprocedure ultrasound images of the right breast were obtained in attempt to locate the patient's biopsy tissue marker associated with the right breast atypia at the 9 o'clock position 3 cm from the nipple. Localization of the biopsy tissue marker under ultrasound guidance was unsuccessful. Therefore, decision was made the stereotactic localization of the biopsy tissue marker for Magseed placement as described below.    PREPROCEDURAL CONSULTATION: The history and physical exam pertinent to the procedure were reviewed and no updates were made.    The procedure was explained to the patient including the risks, benefits, and alternatives. Medications were discussed, including any prior use of blood thinning medications by the patient. Patient allergies were reviewed. The risks, including but not limited to infection and bleeding, were reviewed by the performing physician and the patient agreed to undergo the procedure.    Prior to the procedure, an audible timeout was done to verify patient identification, site and type of procedure. Dr. Richy Farrell, Dr. Contreras Rios, a radiology nurse and a mammography technologist were present.    PROCEDURE: The right breast was marked and sterilely prepped. The heart-shaped Bard biopsy marker was identified in the central lateral quadrant at middle depth. 5 mL  of buffered 1% lidocaine was injected subcutaneously and into the deeper tissues. A 12 cm needle containing a Magseed was advanced into the area of concern using a superior approach. The biopsy marker was localized using a Magseed. The patient tolerated the procedure without difficulty.    Additional images obtained after placement of the Magseed demonstrate satisfactory location of the Magseed in relation to the tissue marker.    IMPRESSION: Status post mammographic-guided Magseed localization of a right breast tissue marker associated with previously biopsied atypia.    POST PROCEDURE MAMMOGRAM FOR MARKER PLACEMENT.    MACRO:  None    Signed by: Richy Farrell 2/19/2025 4:02 PM Dictation workstation:   [id number]     This patient has a urinary catheter   Reason for the urinary catheter remaining today? perioperative use for selected surgical procedures    Assessment/Plan   Assessment & Plan  Ductal carcinoma in situ (DCIS) of left breast    Atypical ductal hyperplasia of right breast    Breast cancer (Multi)      S/P Right magseed excisional biopsy, left skin or nipple sparing mastectomy, left intraoperative lymphatic mapping, axillary sentinel lymph node biopsy with Dr. Barnett and left breast TAMARA reconstruction:  A/P:   Flap surveillance:       - Continue serial flap assessments q2h per nursing with interval checks per plastic surgery team         -> Assess clinical appearance of flap (color, warmth, turgor) plus doppler pulse               signal at marked suture site          -> Check vioptix saturation level hourly (Must remain >30%)          -> Nursing to notify plastic surgery team immediately if there are any acute changes              (Including decreased Doppler/Vioptix signal, pallor, temperature change, bleeding, etc.)      - Monitor surgical sites for s/s of bleeding, infection or dehiscence    Warming:       - Use of Apple hugger to bilateral breast flap region and surrounding skin as tolerated           ·  Settings: low to medium heat , continuous fan           ·  Avoid direct contact of warmer blanket with skin/flap   Dressings/wound care:       - Dermabond to remain intact over breast flap incision lines, keep C/D/I       - Loose pink surgical bra       - Maintain Aquacel Ag covered by Tegaderm dressing at umbilical closure site      Drains:       - JUSTIN drain care to x 3, JUSTIN drains present at L breast and B/L lower abdomen         ·  Strip drain tubing TID and PRN        ·  Monitor and record output q8h         ·  Keep drain sites C/D/I, maintain CHG drain dressings         ·  Call plastics if drain output is >30cc in an hour or greater than 200cc over 8 hours        ·  Removal when output quantities <30 ml for 2 consecutive days    Vac therapy:       - Continue incisional wound vac therapy to lower transverse abdominal incision          ·  Settings: 125 mmHg low continuous suction          ·  Please keep dressing C/D/I, reinforce PRN          ·  Please alert plastics team with any concerns regarding vac          ·  Transition to Prevena portable vac device prior to DC, will remove on outpt follow up    Positioning:       - Patient to maintain beach chair positioning at all times          ·  Head elevated and hips flexed at least 30 degrees, knees bent and elevated on pillows       - Avoid positioning that would apply pressure to flap region or incisions    Weight bearing:       - Patient OOB      - <5 lbs lifting restriction to BUE postoperatively    Other postoperative parameters/care:       - IV Ancef x3 doses postoperatively completed      - Removed humphreys on POD1 AM (2/22)      - Regular diet      - SQ Heparin q8h scheduled to start postoperatively, continue while inpatient        - Nursing to record strict I&Os      - VS monitoring q4h; maintain SBP >110 to ensure adequate flap perfusion      - Encouraged use of IS (10x/hr, each hr while awake)      # Acute postoperative pain  - Nursing to assess patient  pain scales at least q4h and PRN  - Continue current regimen (ERAS protocol) as follows:        ·   Scheduled Tylenol 975mg q8        ·   Gabapentin 100mg TID        ·   Celebrex 200mg BID  - Scheduled PO Colace 100 mg BID and PRN Milk of Mag for bowel regimen  - 4 mg IV Zofran q8h PRN nausea and/or vomiting 2/2 narcotic use      # Leukocytosis, resolved  - WBC count mildly elevated to 16.8 on postoperative labs; Considered likely reactive 2/2 surgery     #Acute on chronic anemia:  - Incoming HGB prior to admission 13.6  - H&H on postoperative labs 11.0/32.8 and POD#2 9.9/30.1  - Presently no S/S of bleeding, considered likely due to intraoperative blood loss   - Keep T&S updated, last 2/19; ordered for AM labs 2/23  - Monitor for S/S of bleeding or symptomatic anemia   - Low transfusion threshold to ensure adequate flap perfusion  - Transfuse for HGB <7 per protocol   - Monitor on AM CBC      F: none, enourage PO intake   E: Monitor and replete PRN       ·   Serum Mg decreased to 1.69 on postoperative labs, replaced with 2g IV post op, recheck 2.29       ·   Continue daily MV  N: regular diet  GI: Continue bowel regimen with Colace BID and Milk of mag PRN        IV/PO Zofran PRN for nausea     DVT ppx:  - Risk score assessed  - SQ heparin q8h while inpatient, transition to Lovenox on POD#3  - Use of SCDs while on bedrest  - early ambulation and OOB POD#1 (2/22)       Disposition: ADOD tomorrow 2/24/25; Continue care on RNF. Will remain inpatient for continued flap monitoring and vac/drain surveillance postoperatively.      Patient and plan discussed with Dr. Reyes.     KANWAL MattaC  Plastic and Reconstructive Surgery   Available via [employer]  Pager #[phone]  Team phone: [phone]

## 2025-02-23 NOTE — ASSESSMENT & PLAN NOTE
S/P Right magseed excisional biopsy, left skin or nipple sparing mastectomy, left intraoperative lymphatic mapping, axillary sentinel lymph node biopsy with Dr. Barnett and left breast TAMARA reconstruction:  A/P:   Flap surveillance:       - Continue serial flap assessments q2h per nursing with interval checks per plastic surgery team         -> Assess clinical appearance of flap (color, warmth, turgor) plus doppler pulse               signal at marked suture site          -> Check vioptix saturation level hourly (Must remain >30%)          -> Nursing to notify plastic surgery team immediately if there are any acute changes              (Including decreased Doppler/Vioptix signal, pallor, temperature change, bleeding, etc.)      - Monitor surgical sites for s/s of bleeding, infection or dehiscence    Warming:       - Use of Apple hugger to bilateral breast flap region and surrounding skin as tolerated          ·  Settings: low to medium heat , continuous fan           ·  Avoid direct contact of warmer blanket with skin/flap   Dressings/wound care:       - Dermabond to remain intact over breast flap incision lines, keep C/D/I       - Loose pink surgical bra       - Maintain Aquacel Ag covered by Tegaderm dressing at umbilical closure site      Drains:       - JUSTIN drain care to x 3, JUSTIN drains present at L breast and B/L lower abdomen         ·  Strip drain tubing TID and PRN        ·  Monitor and record output q8h         ·  Keep drain sites C/D/I, maintain CHG drain dressings         ·  Call plastics if drain output is >30cc in an hour or greater than 200cc over 8 hours        ·  Removal when output quantities <30 ml for 2 consecutive days    Vac therapy:       - Continue incisional wound vac therapy to lower transverse abdominal incision          ·  Settings: 125 mmHg low continuous suction          ·  Please keep dressing C/D/I, reinforce PRN          ·  Please alert plastics team with any concerns regarding vac           ·  Transition to Prevena portable vac device prior to DC, will remove on outpt follow up    Positioning:       - Patient to maintain beach chair positioning at all times          ·  Head elevated and hips flexed at least 30 degrees, knees bent and elevated on pillows       - Avoid positioning that would apply pressure to flap region or incisions    Weight bearing:       - Patient OOB      - <5 lbs lifting restriction to BUE postoperatively    Other postoperative parameters/care:       - IV Ancef x3 doses postoperatively completed      - Removed humphreys on POD1 AM (2/22)      - Regular diet      - SQ Heparin q8h scheduled to start postoperatively, continue while inpatient        - Nursing to record strict I&Os      - VS monitoring q4h; maintain SBP >110 to ensure adequate flap perfusion      - Encouraged use of IS (10x/hr, each hr while awake)      # Acute postoperative pain  - Nursing to assess patient pain scales at least q4h and PRN  - Continue current regimen (ERAS protocol) as follows:        ·   Scheduled Tylenol 975mg q8        ·   Gabapentin 100mg TID        ·   Celebrex 200mg BID  - Scheduled PO Colace 100 mg BID and PRN Milk of Mag for bowel regimen  - 4 mg IV Zofran q8h PRN nausea and/or vomiting 2/2 narcotic use      # Leukocytosis, resolved  - WBC count mildly elevated to 16.8 on postoperative labs; Considered likely reactive 2/2 surgery     #Acute on chronic anemia:  - Incoming HGB prior to admission 13.6  - H&H on postoperative labs 11.0/32.8 and POD#2 9.9/30.1  - Presently no S/S of bleeding, considered likely due to intraoperative blood loss   - Keep T&S updated, last 2/19; ordered for AM labs 2/23  - Monitor for S/S of bleeding or symptomatic anemia   - Low transfusion threshold to ensure adequate flap perfusion  - Transfuse for HGB <7 per protocol   - Monitor on AM CBC      F: none, enourage PO intake   E: Monitor and replete PRN       ·   Serum Mg decreased to 1.69 on postoperative labs,  replaced with 2g IV post op, recheck 2.29       ·   Continue daily MV  N: regular diet  GI: Continue bowel regimen with Colace BID and Milk of mag PRN        IV/PO Zofran PRN for nausea     DVT ppx:  - Risk score assessed  - SQ heparin q8h while inpatient, transition to Lovenox on POD#3  - Use of SCDs while on bedrest  - early ambulation and OOB POD#1 (2/22)       Disposition: ADOD tomorrow 2/24/25; Continue care on RNF. Will remain inpatient for continued flap monitoring and vac/drain surveillance postoperatively.      Patient and plan discussed with Dr. Reyes.     KANWAL MattaC  Plastic and Reconstructive Surgery   Available via [employer]iku  Pager #[phone]  Team phone: [phone]

## 2025-02-23 NOTE — DISCHARGE INSTRUCTIONS
Plastic Surgery Post Discharge Instructions  You were admitted to [facility] for postoperative monitoring and control of pain following right magseed excisional biopsy, left skin or nipple sparing mastectomy, left intraoperative lymphatic mapping, axillary sentinel lymph node biopsy with Dr. Barnett and left breast TAMARA reconstruction with Dr. Reyes with Plastic Surgery on 2/21/25. Included below are post-discharge instructions and details regarding follow-up.     Thank you for allowing us to participate in your care and we wish you the best!    Best Regards,  [facility]  Department of Plastic and Reconstructive Surgery    Activity:  Activity as tolerated. No pushing, pulling or lifting objects greater than 5 pounds until follow up. Ensure no compression is applied to the breast free flap sites. Continue to elevate your *** to alleviate postoperative edema. Please do not apply ice or heat directly to free flap without barrier in place ***.     You may locally bathe following discharge. Avoid soaking or submerging surgical incisions/sites or wetting wound vac dressing or device ***.      Surgical Site/Wound Care:  Prevena/wound vac to abdomen: Please allow Prevena incisional wound vac dressing to remain in place until output follow-up with plastic surgery. When showering, do not allow the wound vac dressing or device to become wet. When resting, please make sure to plug in the device with the supplied power cord to maintain the battery. The device has a power button at the center which is encircled by green lights. There will be one less light illuminated each day (every 24 hrs) which is to be expected, and signifies the count down of the duration of the vac device. If you experience any issues with your wound vac including alarms, malfunction or dressing issues please refer to the provided instruction manual or contact the plastic surgery office at [phone].      Local wound care instructions ***. Avoid application of creams, lotions or ointments to surgical site, no soaking or scrubbing of surgical sites.     *** Continue to monitor flap for changes in general appearance, color, temperature and turgor. Please additionally monitor for any developing signs of infection which may include increased redness, swelling, fever/chills, green/yellow drainage, or foul odor from surgical sites or wounds. If any signs of infection or changes in flap appearance are to occur, please immediately contact the plastic surgery office.     Drain care:  You are being discharged with 3 JUSTIN drains (1 to left breast and 2 bilateral drains). To empty the drain, open the cap, tip into cup and squeeze to empty. Squeeze drain flat then replace the cap.  Please empty the drain and record its output 3 times a day and bring these numbers to your follow up appointment.  The drain output should decrease and the color of the drainage should become lighter (red to pink to yellow).  This drain is sutured into place.  Keep the area around the drain clean and dry.  You may use mild soap and water to cleanse around the drain.  It is ok to shower; do not soak in a tub. Change the gauze around the drain as needed.  Call the office if you notice drastic changes in drain output, bloody drain output, or redness/drainage around insertion site.    Nutrition:  You may resume a regular diet following surgery with increased protein. Ensure that you are drinking an adequate amount of fluids to maintain hydration as well as consuming a diet high in protein and low in sugar. You may consider increasing your fiber intake to avoid constipation.    Do not smoke, as smoking delays healing and increases the risk of complications. Also be sure you are not around people that smoke for at least 6 weeks after surgery.  Second hand smoke is just as harmful as if you were to smoke.    Medication Instructions:  You may resume use of  your home prescribed mediations as previously directed following discharge from the hospital. If you were taking medications prior to your surgery and they are not listed on your discharge homegoing instructions medications list, consult your MD before you resume these medications.    Some postoperative pain is not unusual. This is usually relieved by taking prescribed or over the counter Acetaminophen/Tylenol, Motrin/ibuprofen. In cases of severe pain, you may use prescribed *** as directed. Severe pain despite administration of pain medication must be reported to your physician.    Remember when taking Acetaminophen, do NOT exceed more than 1000 milligrams (mg) per dose or more than 4000 mg total per day. Taking too much Acetaminophen at one time can damage your liver. The maximum amount of ibuprofen in adults is 800 mg per dose or 3200 mg per day. Call your MD if you have any questions about your medications. To prevent constipation while taking narcotic pain medications, please utilize your prescribed bowel regimen, ensure that you drink plenty of water, eat fiber rich foods (a good source is fruit) and increase activity progressively.    DO NOT drive a car while utilizing narcotic pain medications and until cleared by MD at follow-up appointment. Driving or operating heavy machinery, lawnmowers or power tools while taking opiod/narcotic pain medications may impair your judgement.    Call Physician If:  Contact the plastic surgery office for any questions and/or concerns regarding the surgical incision/site.  1. [phone] if Monday-Friday (8 a.m. - 4:30 p.m.)  2. [phone] and ask for the Plastic Surgery team on call provider if after hours or on weekends  3. Email [email or web address] for any non-urgent concerns and when relaying weekly progress photos of flap sites. ***    Call your MD or seek immediate medical attention if you experience any of the following symptoms:  1. Fever of 101.5  (38.5 C) or greater  2. Pain not controlled with prescribed pain medications  3. Uncontrolled nausea and/or vomiting  4. Drainage or swelling around your incisions and/or surgical sites   5. Separation of incisions, or tearing of the incision line  6. Large fluid collection under or around the incision or flap sites   7. Flap discoloration (including darkened appearance)  8. Difficulty breathing  9. Swelling, pain, heat and/or redness in your legs and/or calves  10. Inability to tolerate diet/fluid intake    Additional Notes:  ***    Follow-up/Post Discharge Appointments:  Follow-up care is a key part of your treatment and safety. It is very important that you maintain follow-up care as directed so that your surgical site heals properly and does not lead to problems. Always carry a current medication list with you and bring it to ALL healthcare Provider visits. Be sure to maintain follow up with plastic surgery at your scheduled appointment. If you are unable to keep your appointment, or need to reschedule please contact our office at [phone]. ***

## 2025-02-23 NOTE — CARE PLAN
The patient's goals for the shift include      The clinical goals for the shift include Pt will be safe, comfortable, snd HD stable overnight.      Problem: Pain - Adult  Goal: Verbalizes/displays adequate comfort level or baseline comfort level  Outcome: Progressing     Problem: Safety - Adult  Goal: Free from fall injury  Outcome: Progressing     Problem: Chronic Conditions and Co-morbidities  Goal: Patient's chronic conditions and co-morbidity symptoms are monitored and maintained or improved  Outcome: Progressing     Problem: Discharge Planning  Goal: Discharge to home or other facility with appropriate resources  Outcome: Progressing     Problem: Nutrition  Goal: Nutrient intake appropriate for maintaining nutritional needs  Outcome: Progressing     Problem: Pain  Goal: Takes deep breaths with improved pain control throughout the shift  Outcome: Progressing  Goal: Turns in bed with improved pain control throughout the shift  Outcome: Progressing  Goal: Walks with improved pain control throughout the shift  Outcome: Progressing  Goal: Performs ADL's with improved pain control throughout shift  Outcome: Progressing  Goal: Participates in PT with improved pain control throughout the shift  Outcome: Progressing  Goal: Free from opioid side effects throughout the shift  Outcome: Progressing  Goal: Free from acute confusion related to pain meds throughout the shift  Outcome: Progressing

## 2025-02-24 ENCOUNTER — PHARMACY VISIT (OUTPATIENT)
Dept: PHARMACY | Facility: CLINIC | Age: 60
End: 2025-02-24
Payer: MEDICARE

## 2025-02-24 VITALS
HEART RATE: 75 BPM | DIASTOLIC BLOOD PRESSURE: 75 MMHG | BODY MASS INDEX: 23.14 KG/M2 | HEIGHT: 66 IN | OXYGEN SATURATION: 94 % | WEIGHT: 143.96 LBS | TEMPERATURE: 97.7 F | SYSTOLIC BLOOD PRESSURE: 134 MMHG | RESPIRATION RATE: 18 BRPM

## 2025-02-24 LAB
ERYTHROCYTE [DISTWIDTH] IN BLOOD BY AUTOMATED COUNT: 12 % (ref 11.5–14.5)
HCT VFR BLD AUTO: 32.4 % (ref 36–46)
HGB BLD-MCNC: 10.3 G/DL (ref 12–16)
MCH RBC QN AUTO: 30.6 PG (ref 26–34)
MCHC RBC AUTO-ENTMCNC: 31.8 G/DL (ref 32–36)
MCV RBC AUTO: 96 FL (ref 80–100)
NRBC BLD-RTO: 0 /100 WBCS (ref 0–0)
PLATELET # BLD AUTO: 286 X10*3/UL (ref 150–450)
RBC # BLD AUTO: 3.37 X10*6/UL (ref 4–5.2)
WBC # BLD AUTO: 6.7 X10*3/UL (ref 4.4–11.3)

## 2025-02-24 PROCEDURE — RXMED WILLOW AMBULATORY MEDICATION CHARGE

## 2025-02-24 PROCEDURE — 2500000001 HC RX 250 WO HCPCS SELF ADMINISTERED DRUGS (ALT 637 FOR MEDICARE OP): Performed by: PHYSICIAN ASSISTANT

## 2025-02-24 PROCEDURE — 36415 COLL VENOUS BLD VENIPUNCTURE: CPT | Performed by: PHYSICIAN ASSISTANT

## 2025-02-24 PROCEDURE — 2500000005 HC RX 250 GENERAL PHARMACY W/O HCPCS: Performed by: NURSE PRACTITIONER

## 2025-02-24 PROCEDURE — 2500000005 HC RX 250 GENERAL PHARMACY W/O HCPCS: Performed by: PHYSICIAN ASSISTANT

## 2025-02-24 PROCEDURE — 2500000004 HC RX 250 GENERAL PHARMACY W/ HCPCS (ALT 636 FOR OP/ED): Performed by: PHYSICIAN ASSISTANT

## 2025-02-24 PROCEDURE — 85027 COMPLETE CBC AUTOMATED: CPT | Performed by: PHYSICIAN ASSISTANT

## 2025-02-24 PROCEDURE — 97165 OT EVAL LOW COMPLEX 30 MIN: CPT | Mod: GO

## 2025-02-24 PROCEDURE — 99239 HOSP IP/OBS DSCHRG MGMT >30: CPT | Performed by: PHYSICIAN ASSISTANT

## 2025-02-24 PROCEDURE — 97161 PT EVAL LOW COMPLEX 20 MIN: CPT | Mod: GP

## 2025-02-24 PROCEDURE — 2500000001 HC RX 250 WO HCPCS SELF ADMINISTERED DRUGS (ALT 637 FOR MEDICARE OP): Performed by: NURSE PRACTITIONER

## 2025-02-24 PROCEDURE — 2500000004 HC RX 250 GENERAL PHARMACY W/ HCPCS (ALT 636 FOR OP/ED): Performed by: NURSE PRACTITIONER

## 2025-02-24 RX ORDER — DOCUSATE SODIUM 100 MG/1
100 CAPSULE, LIQUID FILLED ORAL 2 TIMES DAILY PRN
Qty: 14 CAPSULE | Refills: 0 | Status: SHIPPED | OUTPATIENT
Start: 2025-02-24 | End: 2025-03-03

## 2025-02-24 RX ORDER — BISACODYL 10 MG/1
10 SUPPOSITORY RECTAL ONCE
Status: COMPLETED | OUTPATIENT
Start: 2025-02-24 | End: 2025-02-24

## 2025-02-24 RX ORDER — ARGIN/GLUT/CAHMB/COLLAG/MV-MIN 7G-7G-1.5G
1 POWDER IN PACKET (EA) ORAL 2 TIMES DAILY
Qty: 30 EACH | Refills: 0 | Status: SHIPPED | OUTPATIENT
Start: 2025-02-24

## 2025-02-24 RX ORDER — ASPIRIN 81 MG/1
81 TABLET ORAL DAILY
Qty: 30 TABLET | Refills: 0 | Status: SHIPPED | OUTPATIENT
Start: 2025-02-25 | End: 2025-03-27

## 2025-02-24 RX ADMIN — POLYETHYLENE GLYCOL 3350 17 G: 17 POWDER, FOR SOLUTION ORAL at 10:55

## 2025-02-24 RX ADMIN — METHOCARBAMOL 500 MG: 500 TABLET ORAL at 10:55

## 2025-02-24 RX ADMIN — DOCUSATE SODIUM 100 MG: 100 CAPSULE, LIQUID FILLED ORAL at 10:55

## 2025-02-24 RX ADMIN — ACETAMINOPHEN 975 MG: 325 TABLET ORAL at 01:09

## 2025-02-24 RX ADMIN — ACETAMINOPHEN 975 MG: 325 TABLET ORAL at 14:04

## 2025-02-24 RX ADMIN — HEPARIN SODIUM 5000 UNITS: 5000 INJECTION, SOLUTION INTRAVENOUS; SUBCUTANEOUS at 01:09

## 2025-02-24 RX ADMIN — HEPARIN SODIUM 5000 UNITS: 5000 INJECTION, SOLUTION INTRAVENOUS; SUBCUTANEOUS at 10:55

## 2025-02-24 RX ADMIN — NITROGLYCERIN 0.5 INCH: 20 OINTMENT TOPICAL at 14:05

## 2025-02-24 RX ADMIN — ASPIRIN 81 MG: 81 TABLET, COATED ORAL at 09:38

## 2025-02-24 RX ADMIN — METHOCARBAMOL 500 MG: 500 TABLET ORAL at 04:46

## 2025-02-24 RX ADMIN — GABAPENTIN 100 MG: 100 CAPSULE ORAL at 01:09

## 2025-02-24 RX ADMIN — BACITRACIN 1 APPLICATION: 500 OINTMENT TOPICAL at 01:09

## 2025-02-24 RX ADMIN — PANTOPRAZOLE SODIUM 40 MG: 40 TABLET, DELAYED RELEASE ORAL at 09:38

## 2025-02-24 RX ADMIN — CELECOXIB 200 MG: 200 CAPSULE ORAL at 10:55

## 2025-02-24 RX ADMIN — ACETAMINOPHEN 975 MG: 325 TABLET ORAL at 09:38

## 2025-02-24 RX ADMIN — BACITRACIN 1 APPLICATION: 500 OINTMENT TOPICAL at 14:05

## 2025-02-24 RX ADMIN — GABAPENTIN 100 MG: 100 CAPSULE ORAL at 09:38

## 2025-02-24 RX ADMIN — BISACODYL 10 MG: 10 SUPPOSITORY RECTAL at 10:55

## 2025-02-24 ASSESSMENT — COGNITIVE AND FUNCTIONAL STATUS - GENERAL
TOILETING: A LITTLE
MOBILITY SCORE: 22
DAILY ACTIVITIY SCORE: 22
MOBILITY SCORE: 24
DRESSING REGULAR UPPER BODY CLOTHING: A LITTLE
HELP NEEDED FOR BATHING: A LITTLE
DAILY ACTIVITIY SCORE: 20
DRESSING REGULAR LOWER BODY CLOTHING: A LITTLE
WALKING IN HOSPITAL ROOM: A LITTLE
HELP NEEDED FOR BATHING: A LITTLE
CLIMB 3 TO 5 STEPS WITH RAILING: A LITTLE
DRESSING REGULAR LOWER BODY CLOTHING: A LITTLE

## 2025-02-24 ASSESSMENT — PAIN - FUNCTIONAL ASSESSMENT
PAIN_FUNCTIONAL_ASSESSMENT: 0-10

## 2025-02-24 ASSESSMENT — PAIN SCALES - GENERAL
PAINLEVEL_OUTOF10: 0 - NO PAIN
PAINLEVEL_OUTOF10: 2
PAINLEVEL_OUTOF10: 0 - NO PAIN

## 2025-02-24 ASSESSMENT — PAIN DESCRIPTION - LOCATION
LOCATION: ABDOMEN
LOCATION: ABDOMEN

## 2025-02-24 ASSESSMENT — ACTIVITIES OF DAILY LIVING (ADL)
ADL_ASSISTANCE: INDEPENDENT
BATHING_ASSISTANCE: STAND BY
ADL_ASSISTANCE: INDEPENDENT

## 2025-02-24 NOTE — PROGRESS NOTES
02/24/25 1200   Discharge Planning   Living Arrangements Spouse/significant other   Support Systems Spouse/significant other;Children   Assistance Needed none   Type of Residence Private residence   Who is requesting discharge planning? Provider   Home or Post Acute Services None   Expected Discharge Disposition Home   Does the patient need discharge transport arranged? No     59 year old female with PMH of left breast DCIS and right breast ADH who is now s/p right magseed excisional biopsy, left skin or nipple sparing mastectomy, left intraoperative lymphatic mapping, axillary sentinel lymph node biopsy with Dr. Barnett and left breast TAMARA reconstruction with Dr. Reyes. ADOD 2/24, HNN.     This SW/TCC met with pt to introduce self and role. Patient lives in a 2-story home with her , but reports she has access to everything she needs on the first floor. No DME or home O2 requirements. Not active with home care. Family to transport patient home at discharge. Confirmed with meds to beds that delivery will be on the two pm run.  Per the medical team, this patient has no anticipated discharge needs; full assessment deferred at this time.  Please advise SW/TCC if discharge planning needs arise. Yael Canales RN TCC

## 2025-02-24 NOTE — PROGRESS NOTES
[facility]  Ashley Jiang female   1965 59 y.o.   [id number]      Chief Complaint  Follow up clinical exam, right mag seed placement.    History Of Present Illness  Ashley Jiang is a very pleasant 59 y.o. female presenting to the breast center for clinical exam for right breast mag seed placement. 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 On 10/30/2024 She was diagnosed with right breast papillary lesion with at least atypical ductal hyperplasia (ADH). On 2024 she had a 2 site left MRI guided core biopsy showing DCIS. She has been taking low dose Tamoxifen by medical oncology because of the delays in surgical care. On 2025 she will undergo right mag seed excisional biopsy and left mastectomy with Dr. Barnett and left TAMARA reconstruction with Dr. Reyes. She has no family history of breast cancer.    REPRODUCTIVE HISTORY: menarche age 15, , first birth age 28, , OCP's x 5-10 years, perimenopausal, heterogeneously dense                                FAMILY CANCER HISTORY:   Mother: Breast cancer, age 56  Father: Testicular cancer      Review of Systems  Constitutional:  Negative for appetite change, fatigue, fever and unexpected weight change.   HENT:  Negative for ear pain, hearing loss, nosebleeds, sore throat and trouble swallowing.    Eyes:  Negative for discharge, itching and visual disturbance. Positive dry eyes.  Breast: As stated in HPI.  Respiratory:  Negative for chest tightness and shortness of breath.  Positive cough.  Cardiovascular:  Negative for chest pain, palpitations and leg swelling.   Gastrointestinal:  Negative for abdominal pain, constipation, diarrhea and nausea.   Endocrine: Negative for cold intolerance and heat intolerance.   Genitourinary:  Negative for dysuria, frequency, hematuria, pelvic pain and vaginal bleeding.   Musculoskeletal:  Negative for arthralgias, back pain, gait  problem, joint swelling and myalgias.   Skin:  Negative for color change and rash.   Allergic/Immunologic: Negative for environmental allergies and food allergies.   Neurological:  Negative for dizziness, tremors, speech difficulty, weakness, numbness and headaches.   Hematological:  Does not bruise/bleed easily.   Psychiatric/Behavioral:  Negative for agitation, dysphoric mood and sleep disturbance. The patient is not nervous/anxious.       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    Surgical History  She has a past surgical history that includes Breast biopsy (Right, 2/21/2025).    Family History  Cancer-related family history is not on file.     Social History  She reports that she has never smoked. She has never been exposed to tobacco smoke. She has never used smokeless tobacco. No history on file for alcohol use and drug use.    Allergies  Tegaderm [adhesive]    Medications  Current Outpatient Medications   Medication Instructions    acetaminophen (TYLENOL) 975 mg, oral, Every 8 hours PRN    mqexr-vjfr-EsVKC-collag-mv-min (Terence, with collagen,) 7-7-1.5 gram powder in packet 1 Package, oral, 2 times daily    [START ON 2/25/2025] aspirin 81 mg, oral, Daily    azithromycin (Zithromax) 250 mg tablet Take 2 tabs (500 mg) by mouth today, then take 1 tab daily for 4 days.    calcium carbonate-vitamin D3 600 mg-12.5 mcg (500 unit) capsule Take by mouth.    celecoxib (CELEBREX) 200 mg, oral, 2 times daily    docusate sodium (COLACE) 100 mg, oral, 2 times daily PRN    gabapentin (NEURONTIN) 100 mg, oral, Every 8 hours    methocarbamol (ROBAXIN) 500 mg, oral, Every 6 hours scheduled    multivitamin tablet 1 tablet, Daily RT    tamoxifen (Nolvadex) 10 mg tablet Daily       Last Recorded Vitals  Vitals:    02/19/25 1322   BP: 129/85   Pulse: 85   Temp: 36.7 °C (98.1 °F)   SpO2: 99%        Physical  Exam  Patient is alert and oriented x3 and in a relaxed and appropriate mood. Her gait is steady and hand grasps are equal. Sclera is clear. The breasts are nearly symmetrical. The tissue is soft without palpable abnormalities, discrete nodules or masses. The skin and nipples appear normal. There is no cervical, supraclavicular or axillary lymphadenopathy.     Visit Diagnosis  1. Ductal carcinoma in situ (DCIS) of left breast        2. Atypical ductal hyperplasia of right breast            Assessment  Left breast DCIS, right ADH, heterogeneously dense    Plan/Patient Discussion/Summary  Right breast mag seed placement today for surgery 2/21/2025.    You can see your health information, review clinical summaries from office visits & test results online when you follow your health with MY  Chart, a personal health record. To sign up go to [email or web address]. If you need assistance with signing up or trouble getting into your account call [employer] Patient Line 24/7 at [phone].    My office phone number is [phone] if you need to get in touch with me or have additional questions or concerns. Thank you for choosing [facility] and trusting me as your healthcare provider. I look forward to seeing you again at your next office visit. I am honored to be a provider on your health care team and I remain dedicated to helping you achieve your health goals.    TYRONE Brown-CNP

## 2025-02-24 NOTE — SIGNIFICANT EVENT
"  Department of Plastic and Reconstructive Surgery  PM Check    59 year old female with PMH of left breast DCIS and right breast ADH who is now s/p right magseed excisional biopsy, left skin or nipple sparing mastectomy, left intraoperative lymphatic mapping, axillary sentinel lymph node biopsy with Dr. Barnett and left breast TAMARA reconstruction with Dr. Reyes.      Subjective  Resting comfortably in bed, pain well controlled with Gabapentin and Celebrex, numbness and tingling to left hand has almost subsided just in tips of fingers, Tolerating Regular diet, ambulating with assistance, passing gas, no BM. Denies any fever, chills, night sweats, CP, SOB, palpitations, nausea, vomiting, diarrhea, constipation, dysuria, hematuria, hematochezia, hematemesis, flank pain.       Objective  /64 (BP Location: Right arm, Patient Position: Lying)   Pulse 81   Temp 36.6 °C (97.9 °F) (Temporal)   Resp 18   Ht 1.676 m (5' 5.98\")   Wt 65.3 kg (143 lb 15.4 oz)   SpO2 95%   BMI 23.25 kg/m²      Physical Exam  Constitutional: A&Ox3, calm and cooperative, NAD.  Eyes: PERRL, EOMI  ENMT: Moist mucous membranes, no apparent injuries or lesions.  Head/Neck: NC/AT.   Cardiovascular: Normal rate and regular rhythm. 2+ equal pulses of the distal extremities.  Respiratory/Thorax: CTAB, regular respirations on RA. Good symmetric chest expansion.   Gastrointestinal: Abdomen soft, appropriately tender, no peritoneal signs, +BS x 4, no BM, Incisional wound vac to lower abdominal incision without alarms for leak or malfunction. Holding appropriate suction at -125mmHg. Tissue surrounding vac dressing without erythema or edema. Aquacel AG covering umbilicus, dried sanguinous drainage on dressing, JUSTIN drain x 2 to bilateral abdomen with ss output. Abdominal binder in place.  TAMARA free flap recipient site to left breast which is appropriate color, pale but same color as native tissue, without pallor. Area to lateral side with some mild " ecchymosis, soft and compressible. Warm to touch, soft. Incision lines circumferentially around flap, well approximated with sutures and dermabond, no evidence of bleeding or dehiscence. Intact venous Doppler pulse at marked site of flap tissue. Appropriately tender to palpation at flap and surrounding tissue. 2+ Cap refill at the flap. No areas of surrounding edema, fluid collection, induration, drainage or bleeding. x1 JUSTIN drain to left breast with ss output. Surgical bra in place, nerve block removed, Vioptix signal at 73% with 94% signal strength.  Genitourinary: voiding without difficulty post humphreys removal  Extremities: No peripheral edema. Moving all 4 extremities actively.   Neurological: A&Ox3.   Psychological: Appropriate mood and behavior.    S/P Right magseed excisional biopsy, left skin or nipple sparing mastectomy, left intraoperative lymphatic mapping, axillary sentinel lymph node biopsy with Dr. Barnett and left breast TAMARA reconstruction:  A/P:  - Flap check stable, no change from prior assessment, biphasic doppler signal, good strength, warm to touch  - continue flap checks Q4 with interim checks per plastics  - meds to beds sent to [facility] for [place] tomorrow  - all other care per daily progress note  - updated TYRONE Queen-CNP  Plastic and Reconstructive Surgery   Available via [employer]  Pager #[phone]  Team phone: [phone]

## 2025-02-24 NOTE — PROGRESS NOTES
Occupational Therapy    Evaluation    Patient Name: Ashley Jiang  MRN: [id number]  Today's Date: 2/24/2025  Room: [address] [place] [address]  Time Calculation  Start Time: 0937  Stop Time: 0953  Time Calculation (min): 16 min    Assessment  IP OT Assessment  OT Assessment: Pt presents near reported baseline demonstrating IND-SBA for functional mobility, transfers, balance, ADLs/simulated ADLs, and endurance. Pt has safe home setup, good social support, good insight, some access to DME/AE, and is functioning near baseline using compensatory strategies. Pt tolerated session well demonstrating and verbalizing understanding of all education/training. Pt educated on compensatory strategies, optimal environment setup, EC, and precautions. Pt has no further skilled OT needs and is safe to return home with current supports in place.  Prognosis: Excellent  Barriers to Discharge Home: No anticipated barriers  Evaluation/Treatment Tolerance: Patient tolerated treatment well  Medical Staff Made Aware: Yes  End of Session Communication: Bedside nurse  End of Session Patient Position: Up in chair, Alarm off, not on at start of session, Alarm off, caregiver present  Plan:  Inpatient Plan  No Skilled OT: Safe to return home  OT Frequency: OT eval only  OT Discharge Recommendations: No further acute OT, No OT needed after discharge  Equipment Recommended upon Discharge:  (none)  OT Recommended Transfer Status: Stand by assist  OT - OK to Discharge: Yes  OT Assessment  Prognosis: Excellent  Evaluation/Treatment Tolerance: Patient tolerated treatment well  Medical Staff Made Aware: Yes  Strengths: Ability to acquire knowledge, Capable of completing ADLs semi/independent, Attitude of self, Insight into problems, Premorbid level of function, Support of Caregivers  Barriers to Participation:  (none)    Subjective   Current Problem:  1. Ductal carcinoma in situ (DCIS) of left breast  Place in outpatient/hospital ambulatory surgery    Full code    Insert  and maintain peripheral IV    Saline lock IV    Pulse oximetry, spot    Place in outpatient/hospital ambulatory surgery    Full code    Insert and maintain peripheral IV    Saline lock IV    Pulse oximetry, spot    Surgical Pathology Exam    Surgical Pathology Exam    CANCELED: NPO Diet Except: Sips with meds; Effective now    CANCELED: Height and weight    CANCELED: Vital Signs    CANCELED: NPO Diet Except: Sips with meds; Effective now    CANCELED: Height and weight    CANCELED: Vital Signs      2. Atypical ductal hyperplasia of right breast  Surgical Pathology Exam    Surgical Pathology Exam      3. Status post reconstruction procedure  aspirin 81 mg EC tablet    docusate sodium (Colace) 100 mg capsule    xlxkb-fvpb-GcFHO-collag-mv-min (Terence, with collagen,) 7-7-1.5 gram powder in packet      4. Acute postoperative pain  acetaminophen (Tylenol) 325 mg tablet    celecoxib (CeleBREX) 200 mg capsule    gabapentin (Neurontin) 100 mg capsule    methocarbamol (Robaxin) 500 mg tablet        General:  Reason for Referral: s/p right magseed excisional biopsy, left skin or nipple sparing mastectomy, left intraoperative lymphatic mapping, axillary sentinel lymph node biopsy with Dr. Barnett and left breast TAMARA reconstruction with Dr. Reyes.  Past Medical History Relevant to Rehab: left breast DCIS and right breast ADH  Prior to Session Communication: Bedside nurse  Patient Position Received: Up in chair, Alarm off, not on at start of session, Alarm off, caregiver present  Family/Caregiver Present: Yes  Caregiver Feedback: Hsb present and supportive throughout  General Comment: Pt sitting in chair upon arrival. Pleasant and agreeable to OT eval. Pt reporting no further concerns post eval and education.   Precautions:  Precautions Comment: Hips in forward flexed position  Vital Signs:   Date/Time Vitals Session Patient Position Pulse Resp SpO2 BP MAP (mmHg)    02/24/25 1144 --  --  75  18  94 %  134/75  96            Pain:  Pain Assessment  Pain Assessment: 0-10  0-10 (Numeric) Pain Score: 0 - No pain  Lines/Tubes/Drains:  Closed/Suction Drain 1 Lateral RLQ Bulb 19 Fr. (Active)   Number of days: 3       Closed/Suction Drain 2 Left;Anterior Hip 19 Fr. (Active)   Number of days: 3       Closed/Suction Drain 3 Lateral LUQ Bulb (Active)   Number of days: 2         Objective   Cognition:  Overall Cognitive Status: Within Functional Limits  Orientation Level: Oriented X4  Insight: Within function limits  Impulsive: Within functional limits  Processing Speed: Within funtional limits           Home Living:  Type of Home: House  Lives With: Spouse  Home Adaptive Equipment: None  Home Layout: One level, Laundry main level, Able to live on main level with bedroom/bathroom  Home Access: Stairs to enter without rails  Entrance Stairs-Rails: None  Entrance Stairs-Number of Steps: 3  Bathroom Shower/Tub: Walk-in shower  Bathroom Toilet: Handicapped height  Bathroom Equipment: Built-in shower seat, Hand-held shower hose   Prior Function:  Level of [place]: Independent with ADLs and functional transfers, Independent with homemaking with ambulation  ADL Assistance: Independent  Homemaking Assistance: Independent  Ambulatory Assistance: Independent  Hand Dominance: Left  Prior Function Comments: IND and active at baseline  IADL History:  Homemaking Responsibilities: Yes  Meal Prep Responsibility: Primary  Laundry Responsibility: Primary  Cleaning Responsibility: Primary  Bill Paying/Finance Responsibility: Secondary  Shopping Responsibility: Primary   Responsibility: No  Homemaking Comments: Pt primary homemaker at baseline but hsb able to assist during recovery  Current License: Yes  Mode of Transportation: Car  Occupation: Unemployed  Type of Occupation: Volunteers  Leisure and Hobbies: Traveling  ADL:  Eating Assistance: Independent (Anticipated)  Grooming Assistance: Independent (Anticipated)  Bathing Assistance: Stand by  (Anticipated)  UE Dressing Assistance: Independent (Anticipated)  LE Dressing Assistance: Stand by (Anticipated)  Toileting Assistance with Device: Independent (As demo'd)  ADL Comments: likely to maintain independence using compensatory strategies  Activity Tolerance:  Endurance: Tolerates 10 - 20 min exercise with multiple rests  Balance:  Dynamic Sitting Balance  Dynamic Sitting-Balance Support: Feet supported  Dynamic Sitting-Level of Assistance: Distant supervision  Dynamic Sitting-Balance: Reaching for objects (posterior lean, figure four technique)  Dynamic Sitting-Comments: Good balance  Dynamic Standing Balance  Dynamic Standing-Balance Support: No upper extremity supported  Dynamic Standing-Level of Assistance: Close supervision  Dynamic Standing-Balance: Turning, Reaching for objects, Lateral lean, Forward lean  Dynamic Standing-Comments: Good balance  Static Sitting Balance  Static Sitting-Balance Support: Feet supported  Static Sitting-Level of Assistance: Independent  Static Standing Balance  Static Standing-Balance Support: No upper extremity supported  Static Standing-Level of Assistance: Close supervision  Bed Mobility/Transfers:   Bed Mobility  Bed Mobility: No (seated in chair pre and post OT)  Functional Mobility  Functional Mobility Performed: Yes  Functional Mobility 1  Comments 1: Pt ambulated MIN household distance in room using no AD and with SBA and VCs for strategy   and Transfers  Transfer: Yes  Transfer 1  Transfer From 1: Chair with arms to, Stand to  Transfer to 1: Stand, Chair with arms  Technique 1: Sit to stand, Stand to sit  Transfer Device 1:  (no AD)  Transfer Level of Assistance 1: Close supervision, Minimal verbal cues  Trials/Comments 1: VCs for strategy/precautions  Transfers 2  Transfer From 2: Stand to, Toilet to  Transfer to 2: Toilet, Stand  Technique 2: Stand to sit, Sit to stand  Transfer Device 2:  (no AD)  Transfer Level of Assistance 2: Close supervision  IADL's:    Homemaking Responsibilities: Yes  Meal Prep Responsibility: Primary  Laundry Responsibility: Primary  Cleaning Responsibility: Primary  Bill Paying/Finance Responsibility: Secondary  Shopping Responsibility: Primary   Responsibility: No  Homemaking Comments: Pt primary homemaker at baseline but hsb able to assist during recovery  Current License: Yes  Mode of Transportation: Car  Occupation: Unemployed  Type of Occupation: Volunteers  Leisure and Hobbies: Traveling  Vision: Vision - Basic Assessment  Current Vision: Wears glasses all the time (mainly for reading)   and Vision - Complex Assessment  Ocular Range of Motion: Within Functional Limits  Tracking: WFL  Sensation:  Light Touch: No apparent deficits (Denies N/T in BUE)  Sensation Comment: Reports N/T in LUE post op but sensation now WFL at time of eval  Strength:  Strength Comments: BUE WFL  Perception:  Inattention/Neglect: Appears intact  Initiation: Appears intact  Motor Planning: Appears intact  Perseveration: Not present  Coordination:  Movements are Fluid and Coordinated: Yes  Coordination Comment: opposition intact   Hand Function:  Hand Function  Gross Grasp: Functional  Coordination: Functional  Extremities: RUE   RUE : Within Functional Limits, LUE   LUE: Within Functional Limits    Outcome Measures: [facility] Daily Activity  Putting on and taking off regular lower body clothing: A little  Bathing (including washing, rinsing, drying): A little  Putting on and taking off regular upper body clothing: None  Toileting, which includes using toilet, bedpan or urinal: None  Taking care of personal grooming such as brushing teeth: None  Eating Meals: None  Daily Activity - Total Score: 22         ,     OT Adult Other Outcome Measures  4AT: -    Education Documentation  Body Mechanics, taught by Chi Adams OT at 2/24/2025 12:23 PM.  Learner: Significant Other, Patient  Readiness: Acceptance  Method: Explanation, Demonstration  Response:  Verbalizes Understanding, Demonstrated Understanding  Comment: mobility precautions, compensatory strategies, EC, optimal environment setup, benefits of mobility in regrads to healing    Precautions, taught by Chi Adams OT at 2/24/2025 12:23 PM.  Learner: Significant Other, Patient  Readiness: Acceptance  Method: Explanation, Demonstration  Response: Verbalizes Understanding, Demonstrated Understanding  Comment: mobility precautions, compensatory strategies, EC, optimal environment setup, benefits of mobility in regrads to healing    ADL Training, taught by Chi Adams OT at 2/24/2025 12:23 PM.  Learner: Significant Other, Patient  Readiness: Acceptance  Method: Explanation, Demonstration  Response: Verbalizes Understanding, Demonstrated Understanding  Comment: mobility precautions, compensatory strategies, EC, optimal environment setup, benefits of mobility in regrads to healing    Education Comments  No comments found.      02/24/25 at 12:25 PM   Chi Adams OT   Rehab Office: [phone]

## 2025-02-24 NOTE — NURSING NOTE
"Pt discharged with prevena wound vac - wound vac showing blockage but upon troubleshooting, wound vac is holding suction appropriately. Message [employer] PA who stated that they \"...have had a lot of the prevena vacs recently that alarm periodically even when they are suctioning fine and was told from the rep that as long as they are suctioning flat down that it is ok to wrap it in a towel to silence it if they need but otherwise they continue to beep..\" Patient okay with discharge plan and will contact Dr. Reyes's team if needed this week before her appointment on Monday March 3rd. Patient discharged to home via personal transportation. IVs removed and sites intact. Meds to bed delivered pt prescriptions to the bedside. RN reviewed discharge instructions and prescriptions with patient, patient verbalized understanding. Patient packed all belongings. No further discharge needs. Melissa Conrad RN   "

## 2025-02-24 NOTE — PROGRESS NOTES
Physical Therapy    Physical Therapy Evaluation    Patient Name: Ashley Jiang  MRN: [id number]  Today's Date: 2/24/2025   Room: [address] [place] [address]  Time Calculation  Start Time: 1023  Stop Time: 1046  Time Calculation (min): 23 min    Assessment/Plan   PT Assessment  Rehab Prognosis: Excellent  Barriers to Discharge Home: No anticipated barriers  Evaluation/Treatment Tolerance: Patient tolerated treatment well  Medical Staff Made Aware: Yes  Strengths: Attitude of self  End of Session Communication: Bedside nurse  Assessment Comment: 59 year old female s/p s/p right magseed excisional biopsy, left skin or nipple sparing mastectomy, left intraoperative lymphatic mapping, axillary sentinel lymph node biopsy and left breast TAMARA reconstruction on 2/21/25. Pt completing all functional mobility grossly with distant supervision and pushing vioptix. At this time no acute PT needs identifed. Recommend home with NN.  End of Session Patient Position: Bed, 3 rail up, Alarm off, not on at start of session  IP OR SWING BED PT PLAN  Inpatient or Swing Bed: Inpatient  PT Plan  PT Plan: PT Eval only  PT Eval Only Reason: No acute PT needs identified  PT Frequency: PT eval only  PT Discharge Recommendations: No further acute PT, No PT needed after discharge  Equipment Recommended upon Discharge:  (none- can borrow)  PT Recommended Transfer Status: Independent  PT - OK to Discharge: Yes (PT eval complete and DC rec made)    Subjective   General Visit Information:  Reason for Referral: 59 year old female s/p s/p right magseed excisional biopsy, left skin or nipple sparing mastectomy, left intraoperative lymphatic mapping, axillary sentinel lymph node biopsy and left breast TAMARA reconstruction on 2/21/25.  Past Medical History Relevant to Rehab: left breast DCIS and right breast ADH  Prior to Session Communication: Bedside nurse  Patient Position Received:  (walking in hallway)  Family/Caregiver Present: Yes  Caregiver Feedback: pt's   present and supportive during sesison  General Comment: Pt walking in hallway upon approach. Pt pleasant, cooperative and willing to work with PT. Noted wound vac, jps and vioptix.   Home Living:  Home Living  Type of Home: House  Lives With: Spouse  Home Adaptive Equipment: None  Home Layout: One level, Laundry main level, Able to live on main level with bedroom/bathroom  Home Access: Stairs to enter without rails  Entrance Stairs-Rails: None  Entrance Stairs-Number of Steps: 3  Bathroom Shower/Tub: Walk-in shower  Bathroom Toilet: Handicapped height  Bathroom Equipment: Built-in shower seat, Hand-held shower hose  Prior Level of Function:  Prior Function Per Pt/Caregiver Report  Level of [place]: Independent with ADLs and functional transfers, Independent with homemaking with ambulation  ADL Assistance: Independent  Homemaking Assistance: Independent  Ambulatory Assistance: Independent  Prior Function Comments: pt denies any falls over the last 6 months  Precautions:  Precautions  UE Weight Bearing Status:  (no lifitng >5lbs)  Precautions Comment: beach chair positon and slight hip flexion when ambulating  Vital Signs:   Date/Time Vitals Session Patient Position Pulse Resp SpO2 BP MAP (mmHg)    02/24/25 1144 --  --  75  18  94 %  134/75  96           Objective   Lines/Tubes/Drains:  Closed/Suction Drain 1 Lateral RLQ Bulb 19 Fr. (Active)   Number of days: 3       Closed/Suction Drain 2 Left;Anterior Hip 19 Fr. (Active)   Number of days: 3       Closed/Suction Drain 3 Lateral LUQ Bulb (Active)   Number of days: 2     Continuous Medications/Drips:  ropivacaine (PF) in NS cmpd, 10 mL/hr      Pain:  Pain Assessment  Pain Assessment: 0-10  0-10 (Numeric) Pain Score: 0 - No pain  Cognition:  Cognition  Overall Cognitive Status: Within Functional Limits  Orientation Level: Oriented X4    Extremity/Trunk Assessments:  Strength:    RLE   RLE : Within Functional Limits  LLE   LLE : Within Functional Limits    General  Assessments:  Strength  Strength Comments: BUE WFL      Activity Tolerance  Endurance: Endurance does not limit participation in activity  Sensation  Light Touch: No apparent deficits     Static Sitting Balance  Static Sitting-Level of Assistance: Independent  Dynamic Sitting Balance  Dynamic Sitting-Level of Assistance: Independent  Static Standing Balance  Static Standing-Level of Assistance: Distant supervision  Dynamic Standing Balance  Dynamic Standing-Level of Assistance: Distant supervision    Functional Assessments:  Bed Mobility  Bed Mobility: Yes  Bed Mobility 1  Bed Mobility 1: Sitting to supine  Level of Assistance 1: Close supervision, Minimal verbal cues  Bed Mobility Comments 1: HOB partially elevated  Transfers  Transfer: Yes  Transfer 1  Technique 1: Sit to stand, Stand to sit  Transfer Level of Assistance 1: Distant supervision  Ambulation/Gait Training  Ambulation/Gait Training Performed: Yes  Ambulation/Gait Training 1  Surface 1: Level tile  Device 1:  (pushing vioptix)  Assistance 1: Distant supervision  Quality of Gait 1:  (flexed hips per restrictions, good compliance)  Comments/Distance (ft) 1: 100ft and 300ft    Outcome Measures:  [facility] Basic Mobility  Turning from your back to your side while in a flat bed without using bedrails: None  Moving from lying on your back to sitting on the side of a flat bed without using bedrails: None  Moving to and from bed to chair (including a wheelchair): None  Standing up from a chair using your arms (e.g. wheelchair or bedside chair): None  To walk in hospital room: None  Climbing 3-5 steps with railing: None  Basic Mobility - Total Score: 24    Education Documentation  Precautions, taught by Abbie Randhawa, PT at 2/24/2025 12:43 PM.  Learner: Patient  Readiness: Acceptance  Method: Explanation  Response: Verbalizes Understanding  Comment: importance of functional mobility, precautions from plastics    Mobility Training, taught by Abbie Randhawa,  PT at 2/24/2025 12:43 PM.  Learner: Patient  Readiness: Acceptance  Method: Explanation  Response: Verbalizes Understanding  Comment: importance of functional mobility, precautions from plastics    Education Comments  No comments found.      02/24/25 at 12:44 PM   Abbie Randhawa, PT   Rehab Office: [phone]

## 2025-02-24 NOTE — CARE PLAN
The clinical goals for the shift include pt will remain aware of POC.      Problem: Pain - Adult  Goal: Verbalizes/displays adequate comfort level or baseline comfort level  Outcome: Progressing     Problem: Safety - Adult  Goal: Free from fall injury  Outcome: Progressing     Problem: Discharge Planning  Goal: Discharge to home or other facility with appropriate resources  Outcome: Progressing     Problem: Chronic Conditions and Co-morbidities  Goal: Patient's chronic conditions and co-morbidity symptoms are monitored and maintained or improved  Outcome: Progressing     Problem: Nutrition  Goal: Nutrient intake appropriate for maintaining nutritional needs  Outcome: Progressing     Problem: Fall/Injury  Goal: Not fall by end of shift  Outcome: Progressing  Goal: Be free from injury by end of the shift  Outcome: Progressing  Goal: Verbalize understanding of personal risk factors for fall in the hospital  Outcome: Progressing  Goal: Verbalize understanding of risk factor reduction measures to prevent injury from fall in the home  Outcome: Progressing  Goal: Use assistive devices by end of the shift  Outcome: Progressing  Goal: Pace activities to prevent fatigue by end of the shift  Outcome: Progressing     Problem: Pain  Goal: Takes deep breaths with improved pain control throughout the shift  Outcome: Progressing  Goal: Turns in bed with improved pain control throughout the shift  Outcome: Progressing  Goal: Walks with improved pain control throughout the shift  Outcome: Progressing  Goal: Performs ADL's with improved pain control throughout shift  Outcome: Progressing  Goal: Participates in PT with improved pain control throughout the shift  Outcome: Progressing  Goal: Free from opioid side effects throughout the shift  Outcome: Progressing  Goal: Free from acute confusion related to pain meds throughout the shift  Outcome: Progressing

## 2025-02-24 NOTE — DISCHARGE SUMMARY
Discharge Diagnosis  Ductal carcinoma in situ (DCIS) of left breast  Acute postoperative pain  Status post left breast reconstruction via TAMARA free flap     Issues Requiring Follow-Up  Outpatient follow up with plastic surgery for postoperative visit, vac and drain removal.   Follow up finalized result of operative surgical pathology.   Postoperative follow up with breast surgery as scheduled on 3/13.     Test Results Pending At Discharge  Pending Labs       Order Current Status    Surgical Pathology Exam In process          Hospital Course  BRIEF HISTORY:    Ashley Jiang is a 59 y.o. 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 Barnett) with immediate left breast TAMARA reconstruction per plastic surgery (Dr. Reyes).     HOSPITAL COURSE:    The patient was admitted following left breast reconstruction via deep inferior epigastric  (TAMARA) free flap from the abdomen on 2/21/2025. Postoperatively, she was monitored closely in the surgical unit with routine flap checks, including Doppler and Vioptix signal assessment, capillary refill, and skin paddle appearance, all of which remained stable throughout her admission. Surgical JUSTIN drains x3 remained intact with appropriate outputs.     She endorsed some L lower arm and hand numbness postoperatively which self resolved POD#2.     Her WBC count was mildly elevated to 16.8 on postoperative labs which was considered likely reactive 2/2 surgery with subsequent resolve to 8.6 on AM labs POD1. There was no recurrence of leukocytosis throughout admission     Her postoperative HGB was decreased to 11.0 with a historical baseline of ~13 on prior labs which was considered 2/2 acute postoperative anemia 2/2 intraoperative blood loss. Her HGB remained stable >9.7 throughout  admission.     She received multimodal pain management, including scheduled acetaminophen, robaxin, Celebrex and gabapentin. Prophylactic anticoagulation was initiated postoperatively per institutional protocol. She was additionally initiated on once daily ASA on POD1 which will continue x30 days postoperatively for flap thrombus prevention.     The patient was advanced to a regular diet without issue and ambulated with assistance on postoperative day (POD) 1, progressing to independent mobility by POD 2.     She had not yet experienced a bowel movement on POD2 and decision was made to continue to monitor overnight until POD3. Patient was able to have BM x2 on POD3 AM.     Her incisions were monitored and remained well-approximated without signs of infection or dehiscence. The abdominal donor site was assessed regularly, and no concerns were noted. On the day of discharge, her abdominal incisional dressing was connected to a portable incisional wound vac device, which was maintaining seal but triggered periodic obstruction alarms despite no identifiable blockage. She was evaluated by the wound care team without identifiable cause.  The decision was made to discharge her with the wound vac in place, advising her to maintain it as tolerated as long as suction was not lost.    DAY OF DISCHARGE:    The patient met all discharge criteria, including stable vital signs, pain control with oral medications, and independent mobility. She was discharged home in stable condition with instructions for drain care, wound vac management, activity restrictions, and scheduled follow-up in the outpatient clinic.    Pertinent Physical Exam At Time of Discharge  Constitutional: A&Ox3, calm and cooperative, NAD.  Eyes: PERRL, EOMI.  ENMT: Moist mucous membranes, no apparent injuries or lesions.  Head/Neck: NC/AT.   Cardiovascular: Normal rate and regular rhythm. 2+ equal pulses of the distal extremities.  Respiratory/Thorax: Regular  respirations on RA. Good symmetric chest expansion.   Gastrointestinal: Abdomen soft, appropriately tender postoperatively, no peritoneal signs, +BS x 4, +BM, passing gas. Incisional wound vac to lower abdominal incision with periodic alarm for obstruction although vac holding appropriate seal and suction at -125mmHg. Tissue surrounding vac dressing without erythema or edema. Aquacel AG covering umbilicus, dried sanguinous drainage on dressing, JUSTIN drain x 2 to bilateral abdomen with ss output. Abdominal binder in place.  TAMARA free flap recipient site to left breast which is appropriate color, pale but same color as native tissue, without pallor. Warm to touch, soft. Incision lines circumferentially around flap, well approximated with sutures and dermabond, no evidence of bleeding or dehiscence. Intact venous Doppler pulse at marked site of flap tissue. Appropriately tender to palpation at flap and surrounding tissue. 2+ Cap refill at the flap. No areas of surrounding edema, fluid collection, induration, drainage or bleeding. x1 JUSTIN drain to left breast with ss output. Surgical bra in place.  Genitourinary: Voiding independently.   Extremities: No peripheral edema. Moving all 4 extremities actively.   Neurological: A&Ox3.   Psychological: Appropriate mood and behavior.      Home Medications     Medication List      START taking these medications     acetaminophen 325 mg tablet; Commonly known as: Tylenol; Take 3 tablets   (975 mg) by mouth every 8 hours if needed (Pain) for up to 14 days.   aspirin 81 mg EC tablet; Take 1 tablet (81 mg) by mouth once daily.   celecoxib 200 mg capsule; Commonly known as: CeleBREX; Take 1 capsule   (200 mg) by mouth 2 times a day for 14 days.   docusate sodium 100 mg capsule; Commonly known as: Colace; Take 1   capsule (100 mg) by mouth 2 times a day as needed for constipation for up   to 7 days.   gabapentin 100 mg capsule; Commonly known as: Neurontin; Take 1 capsule   (100 mg) by  mouth every 8 hours for 7 days.   Terence (with collagen) 7-7-1.5 gram powder in packet; Generic drug:   [id number]-[id number]-min; Take 1 Package by mouth 2 times a day.   methocarbamol 500 mg tablet; Commonly known as: Robaxin; Take 1 tablet   (500 mg) by mouth every 6 hours for 7 days.     STOP taking these medications     azithromycin 250 mg tablet; Commonly known as: Zithromax   tamoxifen 10 mg tablet; Commonly known as: Nolvadex     ASK your doctor about these medications     calcium carbonate-vitamin D3 600 mg-12.5 mcg (500 unit) capsule   multivitamin tablet       Outpatient Follow-Up  Future Appointments   Date Time Provider Department Center   3/3/2025 11:45 AM Emilie Reyes MD [id number]   3/13/2025 10:30 AM Charlette Barnett MD [facility]       Victoria Naylor PA-C

## 2025-02-25 NOTE — PROGRESS NOTES
Subjective :  Patient ID: Ashley Jiang is a 59 y.o. female presenting for post-operative visit.    History of Present Illness: History of left breast DCIS proven biopsy, denies any other medical issues. Patient presents today post op from surgery on 2/21/25 for left beast TAMARA reconstruction.     Review of Systems  ROS: All 10 systems were reviewed and are unremarkable except for those mentioned in HPI.     Objective :  Physical Exam  Vitals and nursing note reviewed. Exam conducted with a chaperone present.     Constitutional:       General: She is not in acute distress.     Appearance: She is not ill-appearing.   Eyes:      Extraocular Movements: Extraocular movements intact.      Conjunctiva/sclera: Conjunctivae normal.      Pupils: Pupils are equal, round, and reactive to light.   Cardiovascular:      Rate and Rhythm: Normal rate and regular rhythm.      Pulses: Normal pulses.   Pulmonary:      Effort: Pulmonary effort is normal.      Breath sounds: Normal breath sounds.   Abdominal:      Palpations: Abdomen is soft. There is no mass.      Tenderness: There is no abdominal tenderness.      Hernia: No hernia is present.   Musculoskeletal:         General: No swelling or tenderness.      Cervical back: Normal range of motion and neck supple.   Skin:     Capillary Refill: Capillary refill takes less than 2 seconds.      Coloration: Skin is not jaundiced.      Findings: No bruising or rash.   Neurological:      General: No focal deficit present.      Mental Status: She is oriented to person, place, and time.   Psychiatric:         Mood and Affect: Mood normal.         Behavior: Behavior normal.         Thought Content: Thought content normal.         Judgment: Judgment normal.     TAMARA free flap has appropriate color, same color as native tissue, without pallor. Warm to touch, soft. Incision lines circumferentially around flap are all intact, no evidence of bleeding or dehiscence. Ecchymosis along lateral portion of  flap but recovering.    3 JUSTIN drains in place (1 to left breast and 2 bilateral drains). . No erythema or edema surrounding the drain site. There is serous output from the drain. Patient recorded output of the drains showed 2 consecutive days of less than 30cc output at time of removal. Patient was educated on purpose of surgical drains and informed of risk for seroma post drain removal.  instructed her on possibility of seroma formation, and signs and symptoms of this.   JUSTIN drains removed at this visit: 3/3/25    Prevena wound vac removed today revealing clean, dry, well-approximated intact incision along the abdomen    Assessment/Plan :    Ashley presents for her first post-operative visit with her  from surgery on 2/21/25 for left beast TAMARA reconstruction. She has donis doing very well post discharge. Flap is healing well. Today her incisional wound vac was removed. Additionally all 3 JUSTIN drains had less than 20ml per day and were removed. Patient pain is well controlled and she is happy with her results     Activity:   - No pushing, pulling or lifting objects greater than 5 pounds for 6 weeks in total.   - Avoid exercises that load the core.  - Ensure no compression is applied to the breast free flap sites  - Please do not apply ice or heat directly to the skin  - Continue to sleep in a recliner for total 2 weeks post-operatively    Local wound care instructions   - Your left breast flap incisions are closed with sutures and skin glue. The site may be left open and without dressing applied aside from ABD pad and pink surgical bra  - You may shower  - Avoid application of creams, lotions or ointments to surgical site, no soaking or scrubbing of surgical sites.  - Continue to monitor flap for changes in general appearance, color, temperature and turgor.   - Please additionally monitor for any developing signs of infection which may include increased redness, swelling, fever/chills, green/yellow drainage, or foul  odor from surgical sites or wounds.  - If any signs of infection or changes in flap appearance are to occur, please immediately contact the plastic surgery office.   Medication Instructions   - Hold home tamoxifen following surgery for a total of 2 weeks from a plastic surgery standpoint, please do not restart before discussing with us.   - Continue use of once daily aspirin for 30 days following surgery for thrombus (clot) prevention  Nutrition:   -Focus on whole food diet following surgery with increased protein.   -Ensure that you are drinking an adequate amount of fluids to maintain hydration.   -You may consider increasing your fiber intake to avoid constipation.       Follow up in 3 weeks to be cleared for flying

## 2025-02-27 NOTE — OP NOTE
right Magseed excisional biopsy (R), left skin or nipple sparing mastectomy (L), left intraoperative lymphatic mapping, axillary sentinel lymph node biopsy (L) Operative Note     Date: 2025  OR Location: [facility] OR    Name: Ashley Jiang, : 1965, Age: 59 y.o., MRN: [id number], Sex: female    Diagnosis  Pre-op Diagnosis      * Ductal carcinoma in situ (DCIS) of left breast [D05.12]     * Atypical ductal hyperplasia of right breast [N60.91] Post-op Diagnosis     * Ductal carcinoma in situ (DCIS) of left breast [D05.12]     * Atypical ductal hyperplasia of right breast [N60.91]     Procedures  RECONSTRUCTION, BREAST, USING TAMARA SKIN FLAP  98811 - [place] BREAST RECONSTRUCTION W/FREE FLAP    Modifier 22 is indicated due to increased procedural service associated with: dissection of  Vesels x 2 which are under 1 mm in size. The dissection is technically challenging and demanding especially with preservation of nicko nerves often entangled with tiny vessels, and 2-size mismatch between the internal mammary vein and the flap vein.     82102: Intraoperative assessment of flap perfusion with ICG.   Surgeons   Panel 1:     * Charlette Barnett - Primary  Panel 2:     * Emilie Reyes - Primary    Resident/Fellow/Other Assistant:  Surgeons and Role:  * No surgeons found with a matching role *    Staff:   RNFA:   Circulator: Gianna  Circulator: Vignesh Thomas Person: Sarika Connollyub Person: Bing  Scrub Person: Bhavesh Redman Circulator: Concepcion Redman Circulator: Asad    Anesthesia Staff: Anesthesiologist: Bharati Lopez MD MPH; Meliton Villafana MD  CRNA: TYRONE Maldonado-CRNA  C-AA: CARLOS Kimbrough; CARLOS Morales; CARLOS Martin  Frontline Breaker: CARLOS Kimbrough    Procedure Summary  Anesthesia: General  ASA: II  Estimated Blood Loss: 50mL  Intra-op Medications:   Administrations occurring from 0650 to 1850 on 25:   Medication Name Total Dose   sodium chloride 0.9 %  irrigation solution 1,000 mL   bacitracin ointment 1 Application   nitroglycerin (Deon-Bid) 2 % ointment 1 inch   acetaminophen (Tylenol) tablet 975 mg 975 mg   heparin (porcine) injection 5,000 Units 5,000 Units   acetaminophen (Ofirmev) injection 1,000 mg   albumin human 5 % 750 mL   ceFAZolin (Ancef) 1 g 6 g   dexAMETHasone (Decadron) injection 4 mg/mL 4 mg   fentaNYL (Sublimaze) injection 50 mcg/mL 100 mcg   glycopyrrolate (Robinul) injection 0.4 mg   heparin 5,000 units/mL 5,000 Units   HYDROmorphone (Dilaudid) injection 1 mg/mL 1.5 mg   indocyanine green (IC-Green) injection 25 mg 15 mg   LR bolus Cannot be calculated   LR bolus Cannot be calculated   lidocaine (Xylocaine) 2 % 70 mg   midazolam PF (Versed) injection 1 mg/mL 4 mg   ondansetron (Zofran) 2 mg/mL injection 4 mg   phenylephrine 100 mcg/mL syringe 10 mL (prefilled) 280 mcg   propofol (Diprivan) injection 10 mg/mL 649.1 mg   rocuronium 10 mg/mL 290 mg   ropivacaine PF (Naropin) 0.5 % - Vial 35 mL   sugammadex (Bridion) 200 mg/2 mL injection 200 mg   vecuronium (Norcuron) injection 10 mg 8 mg              Anesthesia Record               Intraprocedure I/O Totals          Intake    LR bolus 2000.00 mL    Total Intake 2000 mL       Output    Urine 470 mL    Total Output 470 mL       Net    Net Volume 1530 mL          Specimen:   ID Type Source Tests Collected by Time   1 : RIGHT MAGSEED EXCISIONAL BIOPSY AT 9:00 3 CM FN, SHORT STITCH SUPERIOR, LONG STITCH LATERAL Tissue BREAST LUMPECTOMY RIGHT SURGICAL PATHOLOGY EXAM Charlette Barnett MD 2/21/2025 0846   2 : LEFT SKIN SPARING MASTECTOMY, STITCH AT 12:00 Tissue BREAST MASTECTOMY LEFT SURGICAL PATHOLOGY EXAM Charlette Barnett MD 2/21/2025 0932   3 : LEFT BREAST SENTINEL LYMPH NODES Tissue SENTINEL LYMPH NODE BREAST LEFT SURGICAL PATHOLOGY EXAM Charlette Barnett MD 2/21/2025 0998                 Drains and/or Catheters:   Closed/Suction Drain 1 Lateral RLQ Bulb 19 Fr. (Active)   Site Description Healing 02/24/25  0900   Dressing Status Clean;Dry 02/24/25 0900   Drainage Appearance Serosanguineous 02/23/25 2100   Status To bulb suction 02/23/25 2100   Output (mL) 2.5 mL 02/24/25 1144       Closed/Suction Drain 2 Left;Anterior Hip 19 Fr. (Active)   Site Description Healing 02/24/25 0900   Dressing Status Clean;Dry 02/24/25 0900   Drainage Appearance Serosanguineous 02/23/25 2100   Status To bulb suction 02/23/25 2100   Sutures Removed Intact No (Comment) 02/22/25 2116   Output (mL) 5 mL 02/24/25 1144       Closed/Suction Drain 3 Lateral LUQ Bulb (Active)   Site Description Healing 02/24/25 0900   Dressing Status Clean;Dry 02/24/25 0900   Drainage Appearance Serosanguineous 02/23/25 2100   Status To bulb suction 02/23/25 2100   Output (mL) 15 mL 02/24/25 1144       [REMOVED] Urethral Catheter Straight-tip;Non-latex 16 Fr. (Removed)   Site Assessment Clean;Skin intact 02/22/25 1010   Collection Container Standard drainage bag 02/22/25 1010   Securement Method Securing device (Describe) 02/22/25 1010   Reason for Continuing Urinary Catheterization surgical procedures: urological/gynecological, pelvic oncology, anal, prolonged surgical procedure 02/22/25 1010   Output (mL) 1100 mL 02/22/25 1144       Tourniquet Times: N/A         Implants: None      Indications: Ashley Jiang is an 59 y.o. female who is having surgery for Ductal carcinoma in situ (DCIS) of left breast [D05.12]  Atypical ductal hyperplasia of right breast [N60.91]. Patient will be receiving skin sparing mastectomy to left breast and immediate autologous reconstruction.       CONSENT: I reviewed patient's medical history, and her abdominal CT, and I met with her and evaluated her, and discussed with her the findings of interest to our planned procedure today, and recommendation for surgery. I thoroughly discussed with her pros and cons of our planned procedure, and possible risks of infection,  bleeding and hematoma, seroma, at the abdomen or the breast, nerve and/or  vessels damage at recipient site or abdomen, abdominal wall weakness and hernia, free flap complications ( vascular compromise requiring take back, total or partial flap necrosis with a second free flap/local flap or additional debridement with/without local tissue rearrangement), dissatisfaction with outcomes, need for revision surgeries, complex chronic pain syndrome. The risks discussed also included wound healing problems. Medical complications arising from general anesthesia or surgery itself including but not limited to atelectasis, ileus, PE and DVT and up to mortality were reviewed. The need for blood transfusion, blood thinners, and their complications were adequately covered. Specific discussion of the risks of the anesthetic plan will be discussed by the patient's anesthesia providers. All risks were reviewed in layman´s terms. The patient was given ample time to ask appropriate questions and appeared to be satisfied with the answers provided. All questions were answered and no guarantees have been given regarding the patient's condition and treatment recommendations. The general plan for the postoperative rehabilitation course, including possible need for therapy, was reviewed at great length. Informed consent was signed by all appropriate parties (myself, and my patient). The surgical site was marked in ink by myself. The patient has been actively warmed in preoperative area. Preoperative antibiotics have been ordered and given within 1 hours of incision. Venous thrombosis prophylaxis have been ordered including bilateral sequential compression devices and chemical prophylaxis with 5000 units of subcutaneous heparin.     Findings: Two right DIEA perforators were prepared. Pedicle length: 10 cm, Ischemia time: 14:29-14:2. Breast specimen weight was 654 g, flap weight 564 g. Skin paddle dimensions: 20 cm x 12 cm.     Description of procedure in detail:   The patient was brought to operating theatre on her  bed. As soon as she entered the room, we performed safety huddle and verified patient's ID, MRN, , planned procedure, and surgical sites, and we reviewed allergies history, and need for antibiotics. All in room were in agreement to porceed. Patient was then transferred to OR table, positioned supine with arms at 90 degree with her trunk, supported with arm boards. She was held securely with strap, and all bony prominences were well padded to avoid pressure sores. SCDs were applied to both legs. General anesthesia was administered by anesthesia personnel, and prior to that while in PACU, the patient received nerve blocks to target the chest and abdomen. After she was put to sleep, humphreys catheter was inserted, perforators were marked based on CTA findings verified by ultrasound Doppler. Next, the surgical sites: Chest, and abdomen were prepped and draped in standard fashion. Note: (Standard breast and abdomen markings were done while patient was in PACU, in standing upright position).  Time out was then performed, and incision was announced, and she was given antibiotics prior to incision.     *Dr. Barnett and I worked simultaneously at 2 different sites. I started with right TAMARA flap elevation. Please refer to Dr. Santos's separately dictated report for their part of the surgery.   I performed the inferior incision at the abdomen, and both SIEVs were identified and prepared for 5 cm, then clipped and divided. After that the inferior incision was deepen down to the level of abdominal wall fascia. Then I incised the lateral and superior incisions on the left side of the abdomen, my dissection proceeded in the suprafascial plane with the aid of electrocautery. The dissection proceeded rapidly towards my target perforators which were located with ease--Medial row, and I included another small-to-medium lateral row. Next, I opened the fascia and rectus sheath through the perforating slit of each  connecting them  together exposing the right rectus abdominis muscle, the muscle was opened along the direction of its fibers using cautery sweeping maneuver in preparation for dissection of both perforators. Perforators were then dissected using bipolar and edyta tentotmy scissor. All side branches were clipped with small and medium sized hemoclips, until the DIEA was reached, lidocaine 2% was used freely during perforators and pedicle dissection. The DIEA was dissected until the lateral border of the muscle. During perforators and pedicle dissection, as many segmental motor nerves as possible were preserved. The DIEA was further dissected lateral to the rectus muscle border without additional division of the fascia. After completing the vascular work on the left side, the umbilicus was dissected free from the abdominal tissue. Then then the left side flap was dissected rapidly over the abdominal wall fascia in similar manner, with meticulous control of all adnominal wall perforators with hemoclips.  By the end of this step, the flap was completely dissected off the abdomen, was only perfused by the right DIEA/v, warm irrigation with saline followed by papaverine was performed to relief any spasm, and then the pedicle was examined thoroughly along its course to make sure that there was no bleeding from the pedicle, or injury, or spasm. Discrete pulsation of the entire pedicle until their entry to subcutaneous tissue was confirmed.   8 mg ICG was given IV and flushed with Saline and the flap was spied, the flap skin paddle lit up completely except for zone II of left TAMARA (lateral most of the left side).  The mastectomy skin was also spied at this time, and showed good perfusion. Flap pedicle length was 10 cm.  Zone IV was excised. By this time, the oncologic work is completed.   After confirming that no more oncologic resection is required, I shifted my focus to recipient site. This was skin sparing mastectomy, with 9x6 sq cm skin  excision at the eduardo-areola and inferior pole of the breast. I took necessary time to appreciate the defect and volume in need to be replaced. Next, I worked on recipient vessels exposure. The third rib was exposed and its cartilage was removed to expose the DELFIN and IMVs which appeared healthy, but the vein was small in size. Then I inspected the pocket furthermore, the lateral chest skin was already dissected off the lateral chest wall musculature, and so anchoring sutures with 2/0 PDS were placed to reconstruct the lateral border of the breast in lazy S fashion. Similar sutures were placed to reinforce the IMF which was made 1 cm higher than the contralateral part to accommodate its downward migration when the abdominal wound is closed.   The flap was divided and transferred to the breast.  At this point the flap contained zone I, II, and II. The flap was oriented to bring the right SIEV closer to the recipient vessels, and inset to allow ptotic shape similar to right breast. The shape and size of the breast was compared to the contralateral breast and was found to benefit from additional resection at left zone II, and folding at the lateral breast border/anterior axillary line. The location of the skin paddle was marked. The flap was removed and depeithelialized on side table except for the skin paddle which was to be externalized to replace the excised skin. The flap was inset, its medial aspect now over the recipient side was reflected to allow room for microanastomoses.   I repaired the DIEV first to the lateral most IMV, followed by the artery. The following technique was used for microanastomses: With the aid of double approximation clamps (Hd-S), the flap vessel and IM vessel were brought into soft touch, arteries had matching size, veins were at 2:1 mismatch ( arteries was 2 mm each, vein 2:1 mm), the adventitia was trimmed with curved micro scissor, then end-to-end repair with 9/0 running open loop was  performed. After the completion of anastomosis, it was inspected for leak or soft thrombus formation, and there was none. When I first did the DIEV to IMV, outflow from IMV though the flap vein helped inspecting anastomosis patency and proofing, then the artery established perfusion and out flow from the other DIEV and SIEV was noted. Ischemia time was 14:29-15:20 pm. In all of the anastomoses done on the artery and vein, prior to suturing the back wall, the first wall was inspected to make sure that all bites were full thickness and that the posterior wall was free. After microanastomses were all completed the vessels arrangement was optimized to avoid kink or twist. Hemostasis was checked again at the defect. Flap inset was then completed. Then I spied the flap again and breast and NAC, and they were all adequately perfused. Single 19 fr drains was placed along the lateral chest and inferior border of the flap to drain the dependent site, anastomoses site and lymph node dissection pocket. Flap was spied again as performed earlier, and excellent perfusion was noted.   Finally, abdominal wall closure was performed. The rectus muscle fibers were repaired with buried 2-0 vicryl figure-8 sutures.  The anterior abdominal wall fascia including the anterior rectus sheath was closed with figure of 8 first then running sutures with size 0 vicryl and size 0 locking running prolene . The abdominal flap was then dissected cephalad to the xiyphoid, and then the patient was placed in 20 degrees reflex position. Next, the upper abdominal flap was advanced downwards, and progressive quilting sutures with 2/0 vicryl were used at the epigastrium area above the umbilicus. The inferior transverse line was closed temporarily with staples, the umbilicus new location was then marked, and a kite-like window was drawn and excised to facilitate delivery of the umbilicus. Next, the staples were removed, and additional progressive quilting  sutures were added. Then the 0 PDS and 2/0 PDS were used to close the camper's fascia on both ends of the inferior wound, repairing Camper's-to-Sabiha'k-rs-Rlffowm's in lateral to midline direction. After that, 3/0 monocryl was used for deep dermal repair, and stratafix for subcuticular repair. Next, the reapir of the umbilicus was completed. 3/0 monocryl were used for dep dermal repair, and 4/0 monocryl for subcuticular repair.   Incisional wound VAC was applied over the wound, setting 125 mmHG continuous.   Flap incisions were dressed in derma bond, and VioPtix sensors were placed showing good signal. Patient was put in surgical Bra.   The patient stood surgery well, was awakened successfully and transferred to PACU in stable condition and viable flap.     Post-operative plan:  Patient to stay NPO overnight, IV fluids to maintain 1ml/kg/h urine output, keep Rodríguez until tomorow, 5000 units heparin SQ q8 changed to Lovenox on post-operative day 3 to 5, Aspirin 81 mg postoperative day 1 to 30, multi modal pain management, Keep blocks for 2-3 days (managed by anesthesia), she is encouraged to get out of bed tomorrow. She is to be in beach chair positon to avoid tension on abdominal wound.  Avoid direct pressure on the flap. The wounds will be dressed. Bear hugger will be used to keep patient warm (on medium setting for 5 hours discontinued for 1 hour for 3 days).  Flap check (color and turgor, temperature, and Doppler signal) will be checked q1 for the first 12 hours then q2 for the next 24 hours then q4 after wards,  location was marked with prolene suture. CBC and then blood transfusion if HG is below 9. Monitor VAC and drains and record output. Antibiotics 24 hours.       Task Performed by PA: She helped as my first assistant and assisted me with tissue retractions, and during microvascular repair under th scope, and helped in wound closure and application of wound VAC.       Attending Attestation: I  performed the procedure.    Emilie Brock MD.

## 2025-03-03 ENCOUNTER — APPOINTMENT (OUTPATIENT)
Dept: PLASTIC SURGERY | Facility: CLINIC | Age: 60
End: 2025-03-03
Payer: COMMERCIAL

## 2025-03-03 VITALS
WEIGHT: 142 LBS | SYSTOLIC BLOOD PRESSURE: 115 MMHG | DIASTOLIC BLOOD PRESSURE: 68 MMHG | RESPIRATION RATE: 16 BRPM | HEART RATE: 90 BPM | BODY MASS INDEX: 22.82 KG/M2 | HEIGHT: 66 IN

## 2025-03-03 DIAGNOSIS — Z48.89 ENCOUNTER FOR POSTOPERATIVE WOUND CARE: ICD-10-CM

## 2025-03-03 DIAGNOSIS — Z98.890 STATUS POST FLAP GRAFT: Primary | ICD-10-CM

## 2025-03-03 PROCEDURE — 3008F BODY MASS INDEX DOCD: CPT

## 2025-03-03 PROCEDURE — 99024 POSTOP FOLLOW-UP VISIT: CPT

## 2025-03-03 ASSESSMENT — PAIN SCALES - GENERAL: PAINLEVEL_OUTOF10: 0-NO PAIN

## 2025-03-07 PROBLEM — Z98.890 STATUS POST FLAP GRAFT: Status: ACTIVE | Noted: 2025-03-07

## 2025-03-07 PROBLEM — Z48.89 ENCOUNTER FOR POSTOPERATIVE WOUND CARE: Status: ACTIVE | Noted: 2025-03-07

## 2025-03-09 NOTE — PROGRESS NOTES
Subjective   Ashley Jiang is a 59 y.o. Postmenopausal female returns to the [facility] breast center for a post op visit. She has been recovering well since surgery and has no breast specific complaints today.    Diagnosis date: 10/9/2024 left breast ductal carcinoma in situ, nuclear grade 3, ER % 3+, ID % 3+, cTisN0, stage 0  10/30/2024 right breast papillary lesion with at least ADH    Cancer Staging   Ductal carcinoma in situ (DCIS) of left breast  Staging form: Breast, AJCC 8th Edition  - Clinical stage from 10/9/2024: Stage 0 - Signed by Charlette Barnett MD on 2024  cTis (DCIS)  cN0  cM0  ER Status: Positive  ID Status: Positive  HER2 Status: Not assessed     Ashley Jiang presented at the age of 59 with a screen detected left breast DCIS and right breast ADH. All of her imaging and biopsies were performed at [facility] and have been reviewed by our breast radiologist and pathologist, who agree with the work up and diagnosis. She has had 3 site biopsy (stereo and MR guided) on the left breast, all with DCIS, and she had US biopsy on the right demonstrating ADH. She denies skin changes or nipple discharge. She has no family history of breast or ovarian cancers. She has been started on low dose tamoxifen by medical oncology because of the delays in surgical care. We proceeded to the OR with left skin sparing mastectomy, SLN and right Magseed excisional biopsy followed by immediate TAMARA reconstruction with Dr. Reyes on 25. Final surgical pathology demonstrated ***     BREAST IMAGIN2024 Bilateral screening mammogram demonstrates heterogeneously dense breast tissue density, BI-RADS Category 0, indeterminate calcifications in the upper-outer quadrant of the left breast  2024 left diagnostic mammogram, indicates BI-RADS Category 4, fine and pleomorphic with branching calcifications measuring 2.0 cm and recommended for biopsy  10/17/2024 bilateral breast MRI In the upper-outer quadrant of the  left breast, the patient's biopsy-proven DCIS is identified. This presents as clumped non mass enhancement. The non mass enhancement is greater in span than the calcifications. The outside reports described findings more suggestive of multifocal disease however this appears to be essentially confluent non mass enhancement in the upper-outer quadrant. The entire confluence measures up to 5.1cm. Multiple abnormalities were described in the right breast. The most significant abnormality described in the right breast was a mass in the lower outer quadrant.     BREAST PROCEDURE:   10/9/24 left breast calcifications UOQ, stereotactic-guided core biopsy (DCIS)  10/30/24 right breast, 9:00 3 cm from nipple, US-guided core biopsy (Papillary lesion with at least ADH)  24 left breast UOQ medial and posterior/inferior 2 site MRI-guided core biopsies (DCIS)  25 left skin sparing mastectomy, SLN with TAMARA reconstruction and right Magseed excisional biopsy     REPRODUCTIVE HEALTH: menarche at 15, eduardo-menopausal, , age at first birth 28, , OCPs 5-10 years     MEDICAL HISTORY: hyperlipidemia     FAMILY CANCER HISTORY:   Mother breast cancer at 56  Father with testicular cancer     MEDICAL ONCOLOGY: referral post op if indicated     RADIATION ONCOLOGY: referral post op if indicated     GENETICS: performed, negative for pathogenic variant    Objective     Physical Exam    right Breast: healing radial incision, no underlying volume loss, no erythema    Left breast/axilla: ***    Assessment/Plan   Stage  0  left breast cancer.  Surgery is complete.  Ms. Ashley Jiang was provided with a copy of her pathology report and it was reviewed in detail with her.***  Referrals to medical oncology and radiation oncology have been placed.  Oncotype DX has been ordered to determine the need for adjuvant chemotherapy.  Return to clinic 6 months after completion of local therapy. Bilateral diagnostic mammogram will be ordered  and obtained before the visit.

## 2025-03-10 ENCOUNTER — TELEPHONE (OUTPATIENT)
Dept: PLASTIC SURGERY | Facility: CLINIC | Age: 60
End: 2025-03-10
Payer: COMMERCIAL

## 2025-03-10 DIAGNOSIS — G89.18 ACUTE POSTOPERATIVE PAIN: ICD-10-CM

## 2025-03-10 RX ORDER — GABAPENTIN 100 MG/1
100 CAPSULE ORAL 3 TIMES DAILY
Qty: 90 CAPSULE | Refills: 0 | Status: SHIPPED | OUTPATIENT
Start: 2025-03-10 | End: 2025-04-09

## 2025-03-10 RX ORDER — GABAPENTIN 100 MG/1
100 CAPSULE ORAL EVERY 8 HOURS
Qty: 90 CAPSULE | Refills: 0 | Status: CANCELLED | OUTPATIENT
Start: 2025-03-10 | End: 2025-04-09

## 2025-03-10 NOTE — TELEPHONE ENCOUNTER
Spoke with patient regarding several Epic my chart messages. Patient sent a photo showing some light bruising, we also discussed post op bra's and wearing a compression corset for 4 more weeks. She also reports burning under her armpit area. Gabapentin helped with this post op pain so RX renewal was sent to her pharmacy.

## 2025-03-12 NOTE — PROGRESS NOTES
Subjective :  Patient ID: Ashley Jiang is a 59 y.o. female.    History of Present Illness: Ashley Jiang is a 59 y.o. 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 Barnett) with immediate left breast TAMARA reconstruction per plastic surgery (Dr. Reyes) on 2/21/25. Patient is doing well postop.     Review of Systems  ROS: All 10 systems were reviewed and are unremarkable except for those mentioned in HPI.     Objective :  Physical Exam  Vitals and nursing note reviewed. Exam conducted with a chaperone present.   Constitutional:       General: not in acute distress.     Appearance: not ill-appearing.   Eyes:      Extraocular Movements: Extraocular movements intact.      Conjunctiva/sclera: Conjunctivae normal.      Pupils: Pupils are equal, round, and reactive to light.   Cardiovascular:      Rate and Rhythm: Normal rate and regular rhythm.      Pulses: Normal pulses.   Pulmonary:      Effort: Pulmonary effort is normal.      Breath sounds: Normal breath sounds.   Abdominal:      Palpations: Abdomen is soft. There is no mass.      Tenderness: There is no abdominal tenderness.      Hernia: No hernia is present.   Musculoskeletal:         General: No swelling or tenderness.      Cervical back: Normal range of motion and neck supple.   Skin:     Capillary Refill: Capillary refill takes less than 2 seconds.      Coloration: Skin is not jaundiced.      Findings: No bruising or rash.   Neurological:      General: No focal deficit present.      Mental Status: oriented to person, place, and time.   Psychiatric:         Mood and Affect: Mood normal.         Behavior: Behavior normal.         Thought Content: Thought content normal.         Judgment: Judgment normal.     Focused exam at the breast: Flap is viable, surgical incisions are  intact and well-healed. Residual soreness at the lateral chest, and mild firmness/swelling of the flap periphery.     Abdomen is soft, pliable, with no bulge or hernia, and no palpable masses, and intact surgical incision.    Assessment/Plan :  Patient presents for another postsurgical follow-up status post left immediate DIP flap reconstruction.  Excellent healing so far.  No major issues.  We reviewed postoperative recovery, and we discussed timing for secondary revisions after at least 4 to 6 months from the index surgery.    Pathology report was reviewed with the patient based on their request.  Congratulations on the negative lymph nodes.    Plan/Recommendations:    - Continue to monitor flap for changes in general appearance, color, temperature and turgor.   - Please additionally monitor for any developing signs of infection  -include increased redness, swelling, fever/chills, green/yellow drainage, or foul odor from surgical sites or wounds.  -contact plastics if you develop any signs or symptoms of infection  - Ok to start massaging the left lateral chest, and the breast tissue as taught in consultation today.   -continue diet of whole food and increased protein intake.   -Continue to observe weight restrictions as described for total of 8 weeks.  -Silicone Scar Tape to abdominal scar for a minimum of 12 hours  -Lymphatic massage encouraged to abdomen and the left arm for prophylactic intent.   -Compression corset at the abdomen for a few months, and medical compression bra or shelf bra to the breast for daily wear for the next 2-3 months.   -I cleared the patient for her international travel to the [place].  But please avoid submerging the body in water.

## 2025-03-13 ENCOUNTER — APPOINTMENT (OUTPATIENT)
Dept: SURGICAL ONCOLOGY | Facility: HOSPITAL | Age: 60
End: 2025-03-13
Payer: COMMERCIAL

## 2025-03-18 LAB
LAB AP ASR DISCLAIMER: NORMAL
LAB AP BLOCK FOR ADDITIONAL STUDIES: NORMAL
LABORATORY COMMENT REPORT: NORMAL
PATH REPORT.COMMENTS IMP SPEC: NORMAL
PATH REPORT.FINAL DX SPEC: NORMAL
PATH REPORT.GROSS SPEC: NORMAL
PATH REPORT.RELEVANT HX SPEC: NORMAL
PATH REPORT.TOTAL CANCER: NORMAL
PATHOLOGY SYNOPTIC REPORT: NORMAL

## 2025-03-18 PROCEDURE — 88341 IMHCHEM/IMCYTCHM EA ADD ANTB: CPT | Performed by: STUDENT IN AN ORGANIZED HEALTH CARE EDUCATION/TRAINING PROGRAM

## 2025-03-18 PROCEDURE — 88342 IMHCHEM/IMCYTCHM 1ST ANTB: CPT | Performed by: STUDENT IN AN ORGANIZED HEALTH CARE EDUCATION/TRAINING PROGRAM

## 2025-03-18 PROCEDURE — 0761T DGTZ GLS MCRSCP SL IMM EA 1: CPT | Mod: TC,SUR,WESLAB | Performed by: SURGERY

## 2025-03-24 ENCOUNTER — APPOINTMENT (OUTPATIENT)
Dept: PLASTIC SURGERY | Facility: CLINIC | Age: 60
End: 2025-03-24
Payer: COMMERCIAL

## 2025-03-24 VITALS
BODY MASS INDEX: 22.92 KG/M2 | DIASTOLIC BLOOD PRESSURE: 73 MMHG | HEART RATE: 97 BPM | SYSTOLIC BLOOD PRESSURE: 116 MMHG | WEIGHT: 142 LBS | RESPIRATION RATE: 20 BRPM

## 2025-03-24 DIAGNOSIS — Z98.890 STATUS POST SURGERY: Primary | ICD-10-CM

## 2025-03-24 PROCEDURE — 99024 POSTOP FOLLOW-UP VISIT: CPT

## 2025-03-24 ASSESSMENT — PAIN SCALES - GENERAL: PAINLEVEL_OUTOF10: 1

## 2025-04-01 PROBLEM — C50.919 BREAST CANCER: Status: RESOLVED | Noted: 2025-02-21 | Resolved: 2025-04-01

## 2025-04-01 NOTE — PROGRESS NOTES
Virtual or Telephone Consent    An interactive audio and video telecommunication system which permits real time communications between the patient (at the originating site) and provider (at the distant site) was utilized to provide this telehealth service.   Verbal consent was requested and obtained from Ashley Jiang on this date, 04/08/25 for a telehealth visit and the patient's location was confirmed at the time of the visit.      Subjective   Ashley Jiang is a 59 y.o. Postmenopausal female returns virtually to the [facility] breast center for a post op visit. She has been recovering well since surgery and has no breast specific complaints today.    Diagnosis date: 10/9/2024 left breast ductal carcinoma in situ, nuclear grade 3, ER % 3+, OR % 3+, cTisN0, stage 0  10/30/2024 right breast papillary lesion with at least ADH     Cancer Staging   Ductal carcinoma in situ (DCIS) of left breast  Staging form: Breast, AJCC 8th Edition  - Clinical stage from 10/9/2024: Stage 0 - Signed by Charlette Barnett MD on 12/11/2024  cTis (DCIS)  cN0  cM0  ER Status: Positive  OR Status: Positive  HER2 Status: Not assessed  - Pathologic stage from 2/21/2025: Stage 0 - Signed by Charlette Barnett MD on 4/1/2025  pTis (DCIS)  pN0  cM0  ER Status: Positive  OR Status: Positive  HER2 Status: Not assessed     Ashley Jiang presented at the age of 59 with a screen detected left breast DCIS and right breast ADH. All of her imaging and biopsies were performed at [facility] and have been reviewed by our breast radiologist and pathologist, who agree with the work up and diagnosis. She has had 3 site biopsy (stereo and MR guided) on the left breast, all with DCIS, and she had US biopsy on the right demonstrating ADH. She denies skin changes or nipple discharge. She has no family history of breast or ovarian cancers. We proceeded with right breast magseed excisional biopsy and left skin sparing mastectomy, SLN with immediate TAMARA (Dr. Reyes) on  25. Final surgical pathology for the left was DCIS with negative margins and 3 negative sentinel lymph nodes. On the right there was benign changes.     BREAST IMAGIN2024 Bilateral screening mammogram demonstrates heterogeneously dense breast tissue density, BI-RADS Category 0, indeterminate calcifications in the upper-outer quadrant of the left breast  2024 left diagnostic mammogram, indicates BI-RADS Category 4, fine and pleomorphic with branching calcifications measuring 2.0 cm and recommended for biopsy  10/17/2024 bilateral breast MRI In the upper-outer quadrant of the left breast, the patient's biopsy-proven DCIS is identified. This presents as clumped non mass enhancement. The non mass enhancement is greater in span than the calcifications. The outside reports described findings more suggestive of multifocal disease however this appears to be essentially confluent non mass enhancement in the upper-outer quadrant. The entire confluence measures up to 5.1cm. Multiple abnormalities were described in the right breast. The most significant abnormality described in the right breast was a mass in the lower outer quadrant.     BREAST PROCEDURE:   10/9/24 left breast calcifications UOQ, stereotactic-guided core biopsy (DCIS)  10/30/24 right breast, 9:00 3 cm from nipple, US-guided core biopsy (Papillary lesion with at least ADH)  24 left breast UOQ medial and posterior/inferior 2 site MRI-guided core biopsies (DCIS)  25 left skin sparing mastectomy, SLN, immediate chica and right magseed excisional biopsy     REPRODUCTIVE HEALTH: menarche at 15, eduardo-menopausal, , age at first birth 28, , OCPs 5-10 years     MEDICAL HISTORY: hyperlipidemia     FAMILY CANCER HISTORY:   Mother breast cancer at 56  Father with testicular cancer     MEDICAL ONCOLOGY: not indicated     RADIATION ONCOLOGY: not indicated     GENETICS: performed, negative for pathogenic variant    Objective     Physical  Exam    she reports no concerns with healing one either side, drains have been removed    Assessment/Plan   Stage  0  breast cancer.  Surgery is complete.  Ms. Ashley Jiang has access to her pathology report via [employer] and it was reviewed in detail with her.  Referrals to medical oncology and radiation oncology are not needed with DCIS s/p mastectomy.  We discussed the value of endocrine therapy with ER+ DCIS and with the contralateral atypia. She will meet with my surgery NP to discuss further. Because there was no biopsy site visualized on the right excisional biopsy and no biopsy clip, we discussed obtaining right diagnostic mammogram in June 2025 when she has a follow up scheduled with Dr. Reyes. She understands she does not need screening breast imaging on the left s/p mastectomy. We will plan for her to see my surgery NP at that time to re-visit the discussion about low dose tamoxifen. She has benefited from 100mg TID of gabapentin both for post op pain and hot flashes. She is concerned about hot flashes and mood changes with tamoxifen. We discussed using gabapentin for hotflashes if they increase when she re-starts tamoxifen.  Charlette Barnett MD

## 2025-04-08 ENCOUNTER — TELEMEDICINE (OUTPATIENT)
Dept: SURGICAL ONCOLOGY | Facility: CLINIC | Age: 60
End: 2025-04-08
Payer: COMMERCIAL

## 2025-04-08 DIAGNOSIS — N60.91 ATYPICAL DUCTAL HYPERPLASIA OF RIGHT BREAST: Primary | ICD-10-CM

## 2025-04-08 DIAGNOSIS — D05.12 DUCTAL CARCINOMA IN SITU (DCIS) OF LEFT BREAST: ICD-10-CM

## 2025-04-08 PROCEDURE — 99024 POSTOP FOLLOW-UP VISIT: CPT | Performed by: SURGERY

## 2025-04-09 DIAGNOSIS — G89.18 ACUTE POSTOPERATIVE PAIN: ICD-10-CM

## 2025-04-09 RX ORDER — GABAPENTIN 100 MG/1
100 CAPSULE ORAL EVERY 8 HOURS
Qty: 21 CAPSULE | Refills: 0 | Status: SHIPPED | OUTPATIENT
Start: 2025-04-09 | End: 2025-04-16

## 2025-04-16 DIAGNOSIS — Z98.890 STATUS POST SURGERY: ICD-10-CM

## 2025-04-18 DIAGNOSIS — R19.8 ABDOMINAL WEAKNESS: ICD-10-CM

## 2025-04-18 DIAGNOSIS — M25.612 DECREASED RANGE OF MOTION OF LEFT SHOULDER: ICD-10-CM

## 2025-05-11 DIAGNOSIS — L03.313 CELLULITIS OF CHEST WALL: Primary | ICD-10-CM

## 2025-05-11 RX ORDER — SULFAMETHOXAZOLE AND TRIMETHOPRIM 800; 160 MG/1; MG/1
1 TABLET ORAL 2 TIMES DAILY
Qty: 28 TABLET | Refills: 0 | Status: ON HOLD | OUTPATIENT
Start: 2025-05-11 | End: 2025-05-25

## 2025-05-17 ENCOUNTER — HOSPITAL ENCOUNTER (INPATIENT)
Age: 60
End: 2025-05-17
Payer: COMMERCIAL

## 2025-05-17 ENCOUNTER — HOSPITAL ENCOUNTER (INPATIENT)
Facility: HOSPITAL | Age: 60
End: 2025-05-17
Admitting: PHYSICIAN ASSISTANT
Payer: COMMERCIAL

## 2025-05-17 ENCOUNTER — APPOINTMENT (OUTPATIENT)
Dept: RADIOLOGY | Facility: HOSPITAL | Age: 60
End: 2025-05-17
Payer: COMMERCIAL

## 2025-05-17 DIAGNOSIS — T81.49XA CELLULITIS, WOUND, POST-OPERATIVE: Primary | ICD-10-CM

## 2025-05-17 DIAGNOSIS — N61.0 CELLULITIS OF BREAST: ICD-10-CM

## 2025-05-17 LAB
ABO GROUP (TYPE) IN BLOOD: NORMAL
ALBUMIN SERPL BCP-MCNC: 4.5 G/DL (ref 3.4–5)
ALP SERPL-CCNC: 71 U/L (ref 33–110)
ALT SERPL W P-5'-P-CCNC: 30 U/L (ref 7–45)
ANION GAP SERPL CALC-SCNC: 13 MMOL/L (ref 10–20)
ANTIBODY SCREEN: NORMAL
AST SERPL W P-5'-P-CCNC: 38 U/L (ref 9–39)
BILIRUB SERPL-MCNC: 0.3 MG/DL (ref 0–1.2)
BUN SERPL-MCNC: 22 MG/DL (ref 6–23)
CALCIUM SERPL-MCNC: 9.2 MG/DL (ref 8.6–10.6)
CHLORIDE SERPL-SCNC: 103 MMOL/L (ref 98–107)
CO2 SERPL-SCNC: 26 MMOL/L (ref 21–32)
CREAT SERPL-MCNC: 1.07 MG/DL (ref 0.5–1.05)
CRP SERPL-MCNC: 0.28 MG/DL
EGFRCR SERPLBLD CKD-EPI 2021: 61 ML/MIN/1.73M*2
ERYTHROCYTE [DISTWIDTH] IN BLOOD BY AUTOMATED COUNT: 11.9 % (ref 11.5–14.5)
ERYTHROCYTE [SEDIMENTATION RATE] IN BLOOD BY WESTERGREN METHOD: 10 MM/H (ref 0–30)
GLUCOSE SERPL-MCNC: 124 MG/DL (ref 74–99)
HCT VFR BLD AUTO: 37.2 % (ref 36–46)
HGB BLD-MCNC: 12.2 G/DL (ref 12–16)
INR PPP: 0.9 (ref 0.9–1.1)
MCH RBC QN AUTO: 31 PG (ref 26–34)
MCHC RBC AUTO-ENTMCNC: 32.8 G/DL (ref 32–36)
MCV RBC AUTO: 95 FL (ref 80–100)
NRBC BLD-RTO: 0 /100 WBCS (ref 0–0)
PHOSPHATE SERPL-MCNC: 3.1 MG/DL (ref 2.5–4.9)
PLATELET # BLD AUTO: 250 X10*3/UL (ref 150–450)
POTASSIUM SERPL-SCNC: 3.9 MMOL/L (ref 3.5–5.3)
PROT SERPL-MCNC: 6.8 G/DL (ref 6.4–8.2)
PROTHROMBIN TIME: 10.4 SECONDS (ref 9.8–12.4)
RBC # BLD AUTO: 3.93 X10*6/UL (ref 4–5.2)
RH FACTOR (ANTIGEN D): NORMAL
SODIUM SERPL-SCNC: 138 MMOL/L (ref 136–145)
WBC # BLD AUTO: 5.6 X10*3/UL (ref 4.4–11.3)

## 2025-05-17 PROCEDURE — 99222 1ST HOSP IP/OBS MODERATE 55: CPT | Performed by: PHYSICIAN ASSISTANT

## 2025-05-17 PROCEDURE — 86140 C-REACTIVE PROTEIN: CPT | Performed by: PHYSICIAN ASSISTANT

## 2025-05-17 PROCEDURE — 85610 PROTHROMBIN TIME: CPT | Performed by: PHYSICIAN ASSISTANT

## 2025-05-17 PROCEDURE — 85652 RBC SED RATE AUTOMATED: CPT | Performed by: PHYSICIAN ASSISTANT

## 2025-05-17 PROCEDURE — 86850 RBC ANTIBODY SCREEN: CPT | Performed by: PHYSICIAN ASSISTANT

## 2025-05-17 PROCEDURE — 85027 COMPLETE CBC AUTOMATED: CPT | Performed by: PHYSICIAN ASSISTANT

## 2025-05-17 PROCEDURE — 36415 COLL VENOUS BLD VENIPUNCTURE: CPT | Performed by: PHYSICIAN ASSISTANT

## 2025-05-17 PROCEDURE — 1170000001 HC PRIVATE ONCOLOGY ROOM DAILY

## 2025-05-17 PROCEDURE — 84100 ASSAY OF PHOSPHORUS: CPT

## 2025-05-17 PROCEDURE — 80053 COMPREHEN METABOLIC PANEL: CPT | Performed by: PHYSICIAN ASSISTANT

## 2025-05-17 PROCEDURE — 87075 CULTR BACTERIA EXCEPT BLOOD: CPT | Performed by: PHYSICIAN ASSISTANT

## 2025-05-17 PROCEDURE — 2550000001 HC RX 255 CONTRASTS

## 2025-05-17 PROCEDURE — 2500000004 HC RX 250 GENERAL PHARMACY W/ HCPCS (ALT 636 FOR OP/ED): Mod: JZ | Performed by: PHYSICIAN ASSISTANT

## 2025-05-17 PROCEDURE — 71260 CT THORAX DX C+: CPT | Performed by: RADIOLOGY

## 2025-05-17 PROCEDURE — 71260 CT THORAX DX C+: CPT

## 2025-05-17 PROCEDURE — 87040 BLOOD CULTURE FOR BACTERIA: CPT | Performed by: PHYSICIAN ASSISTANT

## 2025-05-17 RX ORDER — ENOXAPARIN SODIUM 100 MG/ML
40 INJECTION SUBCUTANEOUS DAILY
Status: DISCONTINUED | OUTPATIENT
Start: 2025-05-18 | End: 2025-05-21 | Stop reason: HOSPADM

## 2025-05-17 RX ORDER — HEPARIN SODIUM 5000 [USP'U]/ML
5000 INJECTION, SOLUTION INTRAVENOUS; SUBCUTANEOUS EVERY 8 HOURS
Status: DISCONTINUED | OUTPATIENT
Start: 2025-05-17 | End: 2025-05-17

## 2025-05-17 RX ORDER — VANCOMYCIN HYDROCHLORIDE 1 G/20ML
INJECTION, POWDER, LYOPHILIZED, FOR SOLUTION INTRAVENOUS DAILY PRN
Status: DISCONTINUED | OUTPATIENT
Start: 2025-05-17 | End: 2025-05-18

## 2025-05-17 RX ORDER — TALC
3 POWDER (GRAM) TOPICAL NIGHTLY PRN
Status: DISCONTINUED | OUTPATIENT
Start: 2025-05-17 | End: 2025-05-21 | Stop reason: HOSPADM

## 2025-05-17 RX ORDER — POLYETHYLENE GLYCOL 3350 17 G/17G
17 POWDER, FOR SOLUTION ORAL DAILY
Status: DISCONTINUED | OUTPATIENT
Start: 2025-05-17 | End: 2025-05-21 | Stop reason: HOSPADM

## 2025-05-17 RX ADMIN — IOHEXOL 60 ML: 350 INJECTION, SOLUTION INTRAVENOUS at 23:00

## 2025-05-17 RX ADMIN — PIPERACILLIN SODIUM AND TAZOBACTAM SODIUM 3.38 G: 3; .375 INJECTION, SOLUTION INTRAVENOUS at 22:09

## 2025-05-17 SDOH — HEALTH STABILITY: MENTAL HEALTH: HOW OFTEN DO YOU HAVE SIX OR MORE DRINKS ON ONE OCCASION?: NEVER

## 2025-05-17 SDOH — ECONOMIC STABILITY: FOOD INSECURITY: HOW HARD IS IT FOR YOU TO PAY FOR THE VERY BASICS LIKE FOOD, HOUSING, MEDICAL CARE, AND HEATING?: NOT HARD AT ALL

## 2025-05-17 SDOH — SOCIAL STABILITY: SOCIAL INSECURITY: WITHIN THE LAST YEAR, HAVE YOU BEEN HUMILIATED OR EMOTIONALLY ABUSED IN OTHER WAYS BY YOUR PARTNER OR EX-PARTNER?: NO

## 2025-05-17 SDOH — SOCIAL STABILITY: SOCIAL INSECURITY: WITHIN THE LAST YEAR, HAVE YOU BEEN AFRAID OF YOUR PARTNER OR EX-PARTNER?: NO

## 2025-05-17 SDOH — SOCIAL STABILITY: SOCIAL INSECURITY: HAS ANYONE EVER THREATENED TO HURT YOUR FAMILY OR YOUR PETS?: NO

## 2025-05-17 SDOH — ECONOMIC STABILITY: FOOD INSECURITY: WITHIN THE PAST 12 MONTHS, YOU WORRIED THAT YOUR FOOD WOULD RUN OUT BEFORE YOU GOT THE MONEY TO BUY MORE.: NEVER TRUE

## 2025-05-17 SDOH — ECONOMIC STABILITY: HOUSING INSECURITY: IN THE PAST 12 MONTHS, HOW MANY TIMES HAVE YOU MOVED WHERE YOU WERE LIVING?: 0

## 2025-05-17 SDOH — ECONOMIC STABILITY: FOOD INSECURITY: WITHIN THE PAST 12 MONTHS, THE FOOD YOU BOUGHT JUST DIDN'T LAST AND YOU DIDN'T HAVE MONEY TO GET MORE.: NEVER TRUE

## 2025-05-17 SDOH — HEALTH STABILITY: MENTAL HEALTH: HOW MANY DRINKS CONTAINING ALCOHOL DO YOU HAVE ON A TYPICAL DAY WHEN YOU ARE DRINKING?: 1 OR 2

## 2025-05-17 SDOH — HEALTH STABILITY: MENTAL HEALTH: HOW OFTEN DO YOU HAVE A DRINK CONTAINING ALCOHOL?: 2-4 TIMES A MONTH

## 2025-05-17 SDOH — ECONOMIC STABILITY: INCOME INSECURITY: IN THE PAST 12 MONTHS HAS THE ELECTRIC, GAS, OIL, OR WATER COMPANY THREATENED TO SHUT OFF SERVICES IN YOUR HOME?: NO

## 2025-05-17 SDOH — ECONOMIC STABILITY: HOUSING INSECURITY: AT ANY TIME IN THE PAST 12 MONTHS, WERE YOU HOMELESS OR LIVING IN A SHELTER (INCLUDING NOW)?: NO

## 2025-05-17 SDOH — ECONOMIC STABILITY: TRANSPORTATION INSECURITY: IN THE PAST 12 MONTHS, HAS LACK OF TRANSPORTATION KEPT YOU FROM MEDICAL APPOINTMENTS OR FROM GETTING MEDICATIONS?: NO

## 2025-05-17 SDOH — ECONOMIC STABILITY: HOUSING INSECURITY: IN THE LAST 12 MONTHS, WAS THERE A TIME WHEN YOU WERE NOT ABLE TO PAY THE MORTGAGE OR RENT ON TIME?: NO

## 2025-05-17 SDOH — SOCIAL STABILITY: SOCIAL INSECURITY: DO YOU FEEL UNSAFE GOING BACK TO THE PLACE WHERE YOU ARE LIVING?: NO

## 2025-05-17 SDOH — SOCIAL STABILITY: SOCIAL INSECURITY: ARE THERE ANY APPARENT SIGNS OF INJURIES/BEHAVIORS THAT COULD BE RELATED TO ABUSE/NEGLECT?: NO

## 2025-05-17 SDOH — SOCIAL STABILITY: SOCIAL INSECURITY: DOES ANYONE TRY TO KEEP YOU FROM HAVING/CONTACTING OTHER FRIENDS OR DOING THINGS OUTSIDE YOUR HOME?: NO

## 2025-05-17 SDOH — SOCIAL STABILITY: SOCIAL INSECURITY: DO YOU FEEL ANYONE HAS EXPLOITED OR TAKEN ADVANTAGE OF YOU FINANCIALLY OR OF YOUR PERSONAL PROPERTY?: NO

## 2025-05-17 SDOH — SOCIAL STABILITY: SOCIAL INSECURITY: HAVE YOU HAD THOUGHTS OF HARMING ANYONE ELSE?: NO

## 2025-05-17 SDOH — SOCIAL STABILITY: SOCIAL INSECURITY: ABUSE: ADULT

## 2025-05-17 SDOH — SOCIAL STABILITY: SOCIAL INSECURITY: ARE YOU OR HAVE YOU BEEN THREATENED OR ABUSED PHYSICALLY, EMOTIONALLY, OR SEXUALLY BY ANYONE?: NO

## 2025-05-17 SDOH — SOCIAL STABILITY: SOCIAL INSECURITY: WERE YOU ABLE TO COMPLETE ALL THE BEHAVIORAL HEALTH SCREENINGS?: YES

## 2025-05-17 ASSESSMENT — PAIN DESCRIPTION - DESCRIPTORS: DESCRIPTORS: BURNING;DISCOMFORT;NUMBNESS

## 2025-05-17 ASSESSMENT — COGNITIVE AND FUNCTIONAL STATUS - GENERAL
PATIENT BASELINE BEDBOUND: NO
DAILY ACTIVITIY SCORE: 24
DAILY ACTIVITIY SCORE: 24
MOBILITY SCORE: 24
MOBILITY SCORE: 24

## 2025-05-17 ASSESSMENT — LIFESTYLE VARIABLES
AUDIT-C TOTAL SCORE: 2
SKIP TO QUESTIONS 9-10: 1

## 2025-05-17 ASSESSMENT — ACTIVITIES OF DAILY LIVING (ADL)
ADEQUATE_TO_COMPLETE_ADL: YES
GROOMING: INDEPENDENT
DRESSING YOURSELF: INDEPENDENT
FEEDING YOURSELF: INDEPENDENT
ASSISTIVE_DEVICE: EYEGLASSES
HEARING - LEFT EAR: FUNCTIONAL
TOILETING: INDEPENDENT
LACK_OF_TRANSPORTATION: NO
WALKS IN HOME: INDEPENDENT
JUDGMENT_ADEQUATE_SAFELY_COMPLETE_DAILY_ACTIVITIES: YES
BATHING: INDEPENDENT
PATIENT'S MEMORY ADEQUATE TO SAFELY COMPLETE DAILY ACTIVITIES?: YES
HEARING - RIGHT EAR: FUNCTIONAL

## 2025-05-17 ASSESSMENT — PATIENT HEALTH QUESTIONNAIRE - PHQ9
1. LITTLE INTEREST OR PLEASURE IN DOING THINGS: NOT AT ALL
SUM OF ALL RESPONSES TO PHQ9 QUESTIONS 1 & 2: 0
2. FEELING DOWN, DEPRESSED OR HOPELESS: NOT AT ALL

## 2025-05-17 ASSESSMENT — PAIN SCALES - GENERAL
PAINLEVEL_OUTOF10: 5 - MODERATE PAIN
PAINLEVEL_OUTOF10: 3

## 2025-05-17 ASSESSMENT — PAIN - FUNCTIONAL ASSESSMENT
PAIN_FUNCTIONAL_ASSESSMENT: 0-10
PAIN_FUNCTIONAL_ASSESSMENT: 0-10

## 2025-05-17 ASSESSMENT — COLUMBIA-SUICIDE SEVERITY RATING SCALE - C-SSRS
2. HAVE YOU ACTUALLY HAD ANY THOUGHTS OF KILLING YOURSELF?: NO
6. HAVE YOU EVER DONE ANYTHING, STARTED TO DO ANYTHING, OR PREPARED TO DO ANYTHING TO END YOUR LIFE?: NO
1. IN THE PAST MONTH, HAVE YOU WISHED YOU WERE DEAD OR WISHED YOU COULD GO TO SLEEP AND NOT WAKE UP?: NO

## 2025-05-17 NOTE — CONSULTS
"Vancomycin Dosing by Pharmacy- INITIAL    Ashley Jiang is a 59 y.o. year old female who Pharmacy has been consulted for vancomycin dosing for Cellulitis. Based on the patient's indication and renal status this patient is being dosed based on a goal AUC of 400-600 (pending new RFP 5/17/25)    CrCl cannot be calculated (Patient's most recent lab result is older than the maximum 3 days allowed.).            No results found for: \"STAPHMRSASCR\", \"URINECULTURE\", \"BLOODCULT\", \"TISWDCULTSME\", \"GRAMSTAIN\", \"CDIFFTOXPCR\"     No results found for the last 90 days.      Visit Vitals  /66 (BP Location: Right arm, Patient Position: Sitting)   Pulse 68   Resp 17        Assessment/Plan    Patient will be given a loading dose of 1500 mg, scheduled for 5/17 @2000  Will initiate vancomycin maintenance pending new RFP 5/17  The next level will be obtained on 5/18 AM labs. May be obtained sooner if clinically indicated.   Will continue to monitor renal function daily while on vancomycin and order serum creatinine at least every 48 hours if not already ordered.  Follow for continued vancomycin needs, clinical response, and signs/symptoms of toxicity.       Shira Gatica, PharmD   "

## 2025-05-17 NOTE — H&P
Plastic Surgery History and Physical    Patient Name: Ashley Jiang  MRN: [id number]  Date:  05/17/25     History of Present Illness  Ashley Jiang is a 59 y.o. 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 Barnett and left breast TAMARA reconstruction with Dr. Reyes on 2/21/25. She has been doing well post-operatively however about 2 weeks ago she noticed an area on the upper lateral aspect at about 2 o'clock position started gettung red/irriatated and she messages our team and was prescribed a steroid cream. She stated the redness improved for a short time and then progressed and the same area got red again and started oozing. She sent a picture to our team this past Monday 5/12 and was started on PO Abx. She updated Dr. Gibbons yesterday with a picture however had plans to travel the next few days so he decided to just keep her on PO Abx. She messaged him this morning that her travel plans changed and she was staying local and he instructed her to come in and be admitted for IV Abx and imaging. She Also endorses some swelling superior to the redness into her L upper chest/armpit. She denies pain, fever, chills, nausea, vomiting, diarrhea, shortness of breath and chest pain.    Medical History[1]  Surgical History[2]  Allergies[3]  Current Medications[4]   Family History[5]  Social History[6]  Review of Systems   ROS: All 10 systems were reviewed and are unremarkable except for those mentioned in HPI.    Objective  Vitals:    05/17/25 1825   BP: 112/66   Pulse: 68   Resp: 17   SpO2: 98%       Physical Exam   Constitutional: A&Ox3, calm and cooperative, NAD.  Eyes: PERRL, EOMI  ENMT: Moist mucous membranes, no apparent injuries or lesions.  Head/Neck: NC/AT.   Cardiovascular: Normal rate and regular rhythm. 2+ equal pulses of  the distal extremities.  Respiratory/Thorax: CTAB, regular respirations on RA. Good symmetric chest expansion.   Gastrointestinal: Abdomen soft, appropriately tender, no peritoneal signs, +BS x 4, lower abdominal incision scar well healed.  left breast: TAMARA free flap recipient site to appropriate color, pale but same color as native tissue, without pallor. Area to lateral side with redness and small 1cm opening with very minimal drainage, soft and compressible. Warm to touch, soft. Genitourinary: voiding independently  Extremities: No peripheral edema. Moving all 4 extremities actively.   Neurological: A&Ox3.   Psychological: Appropriate mood and behavior.    Diagnostics   No results found for this or any previous visit (from the past 24 hours).  Imaging  No results found.    Cardiology, Vascular, and Other Imaging  No other imaging results found for the past 7 days      Current Medications  Scheduled medications  Scheduled Medications[7]  Continuous medications  Continuous Medications[8]  PRN medications  PRN Medications[9]     Assessment  Ashley Jiang is a 59 y.o. 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 Barnett and left breast TAMARA reconstruction with Dr. Reyes on 2/21/25 .     Plan/Recommendations  L breast post-op cellulitis  - Admitted for IV Abx and CT imaging       . IV vanco and zosyn initiated 5/17  - 5/17: Wound culture taken at bedside and pending  - Xeroform covered with ABD applied to L breast  - CBC, CMP, PT/INR, blood cultures and T&S ordered and pending    F: none, encourage PO intake  E: monitor and replete PRN  N: regular diet  GI: daily miralax PRN    Dvt ppx:   - lovenox 40mg daily  - SCD's  - encourage ambulation    Disposition: Continue care on RNF. Labs and imaging pending. Will remain  inpatient for IV Abx.    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  [5] No family history on file.  [6]   Social History  Tobacco Use    Smoking status: Never     Passive exposure: Never    Smokeless tobacco: Never   [7] [8] [9]

## 2025-05-18 VITALS
TEMPERATURE: 97.7 F | BODY MASS INDEX: 23.18 KG/M2 | HEIGHT: 66 IN | WEIGHT: 144.2 LBS | HEART RATE: 63 BPM | RESPIRATION RATE: 18 BRPM | DIASTOLIC BLOOD PRESSURE: 62 MMHG | OXYGEN SATURATION: 96 % | SYSTOLIC BLOOD PRESSURE: 101 MMHG

## 2025-05-18 LAB
BACTERIA BLD CULT: NORMAL
BACTERIA SPEC CULT: NORMAL
GRAM STN SPEC: NORMAL
GRAM STN SPEC: NORMAL
VANCOMYCIN SERPL-MCNC: 9.9 UG/ML (ref 5–20)

## 2025-05-18 PROCEDURE — 2500000004 HC RX 250 GENERAL PHARMACY W/ HCPCS (ALT 636 FOR OP/ED): Mod: JZ | Performed by: PHYSICIAN ASSISTANT

## 2025-05-18 PROCEDURE — 80202 ASSAY OF VANCOMYCIN: CPT

## 2025-05-18 PROCEDURE — 1170000001 HC PRIVATE ONCOLOGY ROOM DAILY

## 2025-05-18 PROCEDURE — 2500000004 HC RX 250 GENERAL PHARMACY W/ HCPCS (ALT 636 FOR OP/ED): Performed by: PHYSICIAN ASSISTANT

## 2025-05-18 PROCEDURE — 99232 SBSQ HOSP IP/OBS MODERATE 35: CPT | Performed by: PHYSICIAN ASSISTANT

## 2025-05-18 PROCEDURE — 2500000004 HC RX 250 GENERAL PHARMACY W/ HCPCS (ALT 636 FOR OP/ED): Mod: JZ

## 2025-05-18 PROCEDURE — 36415 COLL VENOUS BLD VENIPUNCTURE: CPT

## 2025-05-18 RX ORDER — CLINDAMYCIN PHOSPHATE 600 MG/50ML
600 INJECTION, SOLUTION INTRAVENOUS EVERY 6 HOURS
Status: DISCONTINUED | OUTPATIENT
Start: 2025-05-18 | End: 2025-05-20

## 2025-05-18 RX ORDER — DIPHENHYDRAMINE HYDROCHLORIDE 50 MG/ML
25 INJECTION, SOLUTION INTRAMUSCULAR; INTRAVENOUS ONCE
Status: COMPLETED | OUTPATIENT
Start: 2025-05-18 | End: 2025-05-18

## 2025-05-18 RX ADMIN — CLINDAMYCIN PHOSPHATE 600 MG: 600 INJECTION, SOLUTION INTRAVENOUS at 23:06

## 2025-05-18 RX ADMIN — VANCOMYCIN HYDROCHLORIDE 1500 MG: 1.5 INJECTION, POWDER, LYOPHILIZED, FOR SOLUTION INTRAVENOUS at 00:45

## 2025-05-18 RX ADMIN — PIPERACILLIN SODIUM AND TAZOBACTAM SODIUM 3.38 G: 3; .375 INJECTION, SOLUTION INTRAVENOUS at 10:14

## 2025-05-18 RX ADMIN — CLINDAMYCIN PHOSPHATE 600 MG: 600 INJECTION, SOLUTION INTRAVENOUS at 18:03

## 2025-05-18 RX ADMIN — PIPERACILLIN SODIUM AND TAZOBACTAM SODIUM 3.38 G: 3; .375 INJECTION, SOLUTION INTRAVENOUS at 16:39

## 2025-05-18 RX ADMIN — ENOXAPARIN SODIUM 40 MG: 100 INJECTION SUBCUTANEOUS at 08:12

## 2025-05-18 RX ADMIN — PIPERACILLIN SODIUM AND TAZOBACTAM SODIUM 3.38 G: 3; .375 INJECTION, SOLUTION INTRAVENOUS at 21:32

## 2025-05-18 RX ADMIN — DIPHENHYDRAMINE HYDROCHLORIDE 25 MG: 50 INJECTION INTRAMUSCULAR; INTRAVENOUS at 02:03

## 2025-05-18 RX ADMIN — PIPERACILLIN SODIUM AND TAZOBACTAM SODIUM 3.38 G: 3; .375 INJECTION, SOLUTION INTRAVENOUS at 03:32

## 2025-05-18 SDOH — SOCIAL STABILITY: SOCIAL INSECURITY: WITHIN THE LAST YEAR, HAVE YOU BEEN AFRAID OF YOUR PARTNER OR EX-PARTNER?: NO

## 2025-05-18 SDOH — ECONOMIC STABILITY: INCOME INSECURITY: IN THE PAST 12 MONTHS HAS THE ELECTRIC, GAS, OIL, OR WATER COMPANY THREATENED TO SHUT OFF SERVICES IN YOUR HOME?: NO

## 2025-05-18 SDOH — ECONOMIC STABILITY: HOUSING INSECURITY: IN THE LAST 12 MONTHS, WAS THERE A TIME WHEN YOU WERE NOT ABLE TO PAY THE MORTGAGE OR RENT ON TIME?: NO

## 2025-05-18 SDOH — ECONOMIC STABILITY: FOOD INSECURITY: WITHIN THE PAST 12 MONTHS, YOU WORRIED THAT YOUR FOOD WOULD RUN OUT BEFORE YOU GOT THE MONEY TO BUY MORE.: NEVER TRUE

## 2025-05-18 SDOH — ECONOMIC STABILITY: FOOD INSECURITY: WITHIN THE PAST 12 MONTHS, THE FOOD YOU BOUGHT JUST DIDN'T LAST AND YOU DIDN'T HAVE MONEY TO GET MORE.: NEVER TRUE

## 2025-05-18 SDOH — ECONOMIC STABILITY: TRANSPORTATION INSECURITY: IN THE PAST 12 MONTHS, HAS LACK OF TRANSPORTATION KEPT YOU FROM MEDICAL APPOINTMENTS OR FROM GETTING MEDICATIONS?: NO

## 2025-05-18 SDOH — ECONOMIC STABILITY: HOUSING INSECURITY: AT ANY TIME IN THE PAST 12 MONTHS, WERE YOU HOMELESS OR LIVING IN A SHELTER (INCLUDING NOW)?: NO

## 2025-05-18 SDOH — SOCIAL STABILITY: SOCIAL INSECURITY: WITHIN THE LAST YEAR, HAVE YOU BEEN HUMILIATED OR EMOTIONALLY ABUSED IN OTHER WAYS BY YOUR PARTNER OR EX-PARTNER?: NO

## 2025-05-18 SDOH — ECONOMIC STABILITY: HOUSING INSECURITY: IN THE PAST 12 MONTHS, HOW MANY TIMES HAVE YOU MOVED WHERE YOU WERE LIVING?: 0

## 2025-05-18 ASSESSMENT — ACTIVITIES OF DAILY LIVING (ADL)
LACK_OF_TRANSPORTATION: NO
LACK_OF_TRANSPORTATION: NO

## 2025-05-18 ASSESSMENT — COGNITIVE AND FUNCTIONAL STATUS - GENERAL
MOBILITY SCORE: 24
MOBILITY SCORE: 24
DAILY ACTIVITIY SCORE: 24
DAILY ACTIVITIY SCORE: 24

## 2025-05-18 ASSESSMENT — PAIN SCALES - GENERAL
PAINLEVEL_OUTOF10: 2
PAINLEVEL_OUTOF10: 0 - NO PAIN

## 2025-05-18 ASSESSMENT — PAIN - FUNCTIONAL ASSESSMENT: PAIN_FUNCTIONAL_ASSESSMENT: 0-10

## 2025-05-18 NOTE — PROGRESS NOTES
"  Division of Plastic and Reconstructive Surgery  Progress Note    Patient Name: Ashley Jiang  MRN: [id number]  Date:  05/18/25     Subjective    Denies acute concerns this morning.  Left breast wound/infection site pain minimal, controlled.  Feels as though erythema has somewhat improved since the initiation of IV antibiotics on admission.  She is dressing the small open superficial incisional wound site at the left lateral aspect of her breast incision with Xeroform covered by gauze secured with a pink surgical bra.  She has had complete resolution of pruritus following the discontinuation of IV vancomycin yesterday evening.  She does not wish to receive any additional doses of this medication given concern for medication allergy.  She has never had a historical reaction to vancomycin in the past that she is aware of.  She is otherwise ambulating and voiding independently.  Tolerating a regular diet. Denies fever, chills, headache, dizziness, chest pain, palpitations, dyspnea, abdominal pain, nausea or vomiting.    Overnight Events/Additional Information:   CT chest with IV contrast obtained upon patient presentation yesterday evening which did not reveal overt evidence of fluid collection or abscess.     Patient had onset of generalized pruritus after IV vancomycin started administering yesterday evening.  This medication was discontinued and the patient was provided with Benadryl.  She had complete resolution of her pruritus following medication discontinuation.  IV clindamycin added to antibiotic regimen today for additional coverage with Zosyn.      Anticipate possible surgical intervention this upcoming week for left breast flap incision I&D and closure.    Objective    Vital Signs  BP 98/60 (BP Location: Right arm, Patient Position: Lying)   Pulse 65   Temp 36.1 °C (97 °F) (Temporal)   Resp 16   Ht 1.676 m (5' 6\")   Wt 65.4 kg (144 lb 3.3 oz)   SpO2 95%   BMI 23.27 kg/m²      Physical " Exam  Constitutional: A&Ox3, calm and cooperative, NAD.  Eyes: EOMI, clear sclera.  ENMT: Moist mucous membranes.  Head/Neck: NC/AT. Neck supple.   Cardiovascular: Normal rate and regular rhythm.   Respiratory/Thorax: Breathing comfortably with regular respirations on RA. Good symmetric chest expansion.   Breast: Healed prior TAMARA free flap recipient site to left breast which is appropriate color, pale but same color as native tissue. Small (approximately [id number]) superficial wound area to lateral aspect of left breast flap incision line with wound bed that appears pale pink.  Wound is productive of a scant amount of serous drainage, no purulence.  There is periwound erythema, induration and warmth.  No palpable deep fluid collection, fluctuance or abscess.  Gastrointestinal: Abdomen soft, non-distended.   Genitourinary: Voiding independently.   Extremities: No peripheral edema. Moves all 4 extremities actively, strength appropriate.   Neurological: A&Ox3.   Psychological: Appropriate mood and behavior.   Skin: Warm and dry, no rashes.     Current Medications  Scheduled medications  Scheduled Medications[1]  Continuous medications  Continuous Medications[2]  PRN medications  PRN Medications[3]     Assessment   Ashley Jiang is a 59 y.o. 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 Barnett and left breast TAMARA reconstruction with Dr. Reyes on 2/21/25 who was re-admitted to the plastic surgery service on 5/17/25 given c/f left breast cellulitis vs. abscess.     Plan:  # S/p L breast reconstruction via TAMARA free flap with left lateral breast cellulitis and superficial wound   - Anticipate return to OR with plastic surgery/Dr. Reyes this upcoming week, either Tuesday or Wednesday for left breast I&D and wound closure  -  Continue interim daily local wound care with application of small piece of Xeroform to superficial open wound site at the left lateral breast covered by gauze secured with pink surgical bra  - DC IV vancomycin given pruritic medication reaction and per patient request, continue IV Zosyn and clindamycin with frequent reassessment of response to IV antibiotics  - Follow-up results of bedside wound cultures obtained upon admission 5/17  - Continue Ensure and Terence protein supplements to optimize nutrition and promote wound healing  - Activity as tolerated, encourage frequent ambulation  - Assess daily labs while patient on IV antibiotics  - Monitor vital signs every 8 hours    F: Encouraged PO fluid intake, will add bolus or mIVF if serum Cr continues to remain elevated on labs obtained 5/19 AM  E: Monitor and replete PRN, currently stable   N: Regular diet  GI: Daily miralax PRN     Dvt ppx:   - Lovenox 40mg daily  - SCD's  - Encourage ambulation     Disposition: Continue care on RNF pending possible OR intervention per plastic surgery (Dr. Reyes) this week for left breast I&D and closure and pending remaining workup results (cultures) and treatment response to IV ABX.      Patient and plan discussed with Dr. Reyes.    Victoria Naylor PA-C  Plastic and Reconstructive Surgery   [place]  Pager #[phone]  Team phone: [phone]        [1] enoxaparin, 40 mg, subcutaneous, Daily  piperacillin-tazobactam, 3.375 g, intravenous, q6h  polyethylene glycol, 17 g, oral, Daily  [2]    [3] PRN medications: melatonin, vancomycin

## 2025-05-18 NOTE — CARE PLAN
Problem: Pain - Adult  Goal: Verbalizes/displays adequate comfort level or baseline comfort level  Outcome: Progressing     Problem: Safety - Adult  Goal: Free from fall injury  Outcome: Progressing     Problem: Discharge Planning  Goal: Discharge to home or other facility with appropriate resources  Outcome: Progressing     Problem: Chronic Conditions and Co-morbidities  Goal: Patient's chronic conditions and co-morbidity symptoms are monitored and maintained or improved  Outcome: Progressing     Problem: Nutrition  Goal: Nutrient intake appropriate for maintaining nutritional needs  Outcome: Progressing   The patient's goals for the shift include      The clinical goals for the shift include pt will remain safe and free from injury

## 2025-05-18 NOTE — PROGRESS NOTES
05/18/25 1807   Discharge Planning   Living Arrangements Spouse/significant other   Support Systems Spouse/significant other   Assistance Needed none   Type of Residence Private residence   Number of Stairs to Enter Residence 3   Number of Stairs Within Residence 0   Do you have animals or pets at home? No   Who is requesting discharge planning? Other (Comment)  (TCC assessment)   Home or Post Acute Services None   Expected Discharge Disposition Home   Does the patient need discharge transport arranged? No   Financial Resource Strain   How hard is it for you to pay for the very basics like food, housing, medical care, and heating? Not very   Housing Stability   In the last 12 months, was there a time when you were not able to pay the mortgage or rent on time? N   In the past 12 months, how many times have you moved where you were living? 0   At any time in the past 12 months, were you homeless or living in a shelter (including now)? N   Transportation Needs   In the past 12 months, has lack of transportation kept you from medical appointments or from getting medications? no   In the past 12 months, has lack of transportation kept you from meetings, work, or from getting things needed for daily living? No   Intensity of Service   Intensity of Service 0-30 min     Transitional Care Coordinator Note: Met with patient to discuss discharge planning s/p admission.  Patient lives home with spouse( Marcio) as listed.  Independent in all ADL's. Requires no assist devices for ambulation.  Patient denies active home care or home care needs.  Demographics and contact information confirmed.  Will continue to monitor patient for all home going needs.  Diane Espinoza RN TCC via Epic.    Falls- none  Equipment -none  HC -none  Picc/Port- none  SW- none  O2/Cpap- none  Diabetes- none   Dialysis- none  PCP- Dr. Craft[facility]Sterling- [facility] last seen 1month ago   Pharm- [facility]   Transport at discharge- spouse    Therapy Recommendations-none at this time

## 2025-05-18 NOTE — PROGRESS NOTES
Vancomycin Dosing by Pharmacy- FOLLOW UP    Ahsley Jiang is a 59 y.o. year old female who Pharmacy has been consulted for vancomycin dosing for cellulitis, skin and soft tissue. Based on the patient's indication and renal status this patient is being dosed based on a goal AUC of 400-600.     Renal function is currently stable.  Continue to monitor scr closely. Slight inc in scr from baseline (~0.8 in February, now 1.07), continue to monitor closely.     Current vancomycin dose: 1500 mg  x1 load.    Estimated vancomycin AUC on current dose: 464 mg/L.hr     Visit Vitals  BP 98/60 (BP Location: Right arm, Patient Position: Lying)   Pulse 65   Temp 36.1 °C (97 °F) (Temporal)   Resp 16        Lab Results   Component Value Date    CREATININE 1.07 (H) 2025    CREATININE 0.70 2025    CREATININE 0.78 2025    CREATININE 0.87 2025        Patient weight is as follows:   Vitals:    25 0537   Weight: 65.4 kg (144 lb 3.3 oz)       Cultures:  No results found for the encounter in last 14 days.       I/O last 3 completed shifts:  In: 460 (7 mL/kg) [P.O.:100; IV Piggyback:360]  Out: 0 (0 mL/kg)   Weight: 65.4 kg   I/O during current shift:  No intake/output data recorded.    Temp (24hrs), Av.3 °C (97.3 °F), Min:36.1 °C (97 °F), Max:36.5 °C (97.7 °F)      Assessment/Plan    Within goal AUC range. Start scheduled vancomycin regimen of 1250mg q24h..    This dosing regimen is predicted by [employer] to result in the following pharmacokinetic parameters:  Loading dose: N/A  Regimen: 1250 mg IV every 24 hours.  Start time: 00:45 on 2025  Exposure target: AUC24 (range)400-600 mg/L.hr   [id number]: 371 mg/L.hr  AUC24,ss: 464 mg/L.hr  Probability of AUC24 > 400: 73 %  Ctrough,ss: 15.4 mg/L  Probability of Ctrough,ss > 20: 18 %      The next level will be obtained on  at 1000. May be obtained sooner if clinically indicated.   Will continue to monitor renal function daily while on vancomycin and order  serum creatinine at least every 48 hours if not already ordered.  Follow for continued vancomycin needs, clinical response, and signs/symptoms of toxicity.       Carrie Kaiser, PharmD

## 2025-05-18 NOTE — PROGRESS NOTES
"Pharmacy Medication History Review    Ashley Jiang is a 59 y.o. female admitted for Cellulitis of breast. Pharmacy reviewed the patient's [id number] medications and allergies for accuracy.    Medications ADDED:  None    Medications CHANGED:  None   Medications REMOVED:   Terence with collagen 7-7-1.5 gram packet   Calcium carbonate - vitamin d 3 600-12.5 mg   Gabapentin 100 mg   Methocarbamol 500 mg     The list below reflects the updated PTA list.   Prior to Admission Medications   Prescriptions Last Dose Informant   multivitamin tablet 5/17/2025 Morning Self   Sig: Take 1 tablet by mouth once daily.   sulfamethoxazole-trimethoprim (Bactrim DS) 800-160 mg tablet 5/17/2025 Morning Self   Sig: Take 1 tablet by mouth 2 times a day for 14 days.  Day 5 dosing - morning dose only       Facility-Administered Medications: None        The list below reflects the updated allergy list. Please review each documented allergy for additional clarification and justification.  Allergies  Reviewed by Josafat Blackwell on 5/17/2025        Severity Reactions Comments    Tegaderm [adhesive] Medium Itching, Rash             Patient accepts M2B at discharge.     Sources:   Chart review   Patient interview   OARRS ( yes )   Epic dispense history  Epic allergy list     Additional Comments:  Patient is on day 5 dosing of BACTRIM 800-160 MG DS: 14 QTY: 28 - morning dose only      OARRS   3/10/2025 GABAPENTIN 100 MG DS: 30 QTY: 90  4/10/2025 GABAPENTIN 100 MG DS: 7 QTY: 21    Josafat Blackwell  Pharmacy Technician  05/17/25     Secure Chat preferred   If no response call [phone] or SPIL [employer]AMES \"Med Rec\"   "

## 2025-05-19 LAB
ALBUMIN SERPL BCP-MCNC: 4 G/DL (ref 3.4–5)
ANION GAP SERPL CALC-SCNC: 11 MMOL/L (ref 10–20)
BACTERIA SPEC CULT: NORMAL
BUN SERPL-MCNC: 16 MG/DL (ref 6–23)
CALCIUM SERPL-MCNC: 9.3 MG/DL (ref 8.6–10.6)
CHLORIDE SERPL-SCNC: 105 MMOL/L (ref 98–107)
CO2 SERPL-SCNC: 26 MMOL/L (ref 21–32)
CREAT SERPL-MCNC: 0.95 MG/DL (ref 0.5–1.05)
EGFRCR SERPLBLD CKD-EPI 2021: 70 ML/MIN/1.73M*2
ERYTHROCYTE [DISTWIDTH] IN BLOOD BY AUTOMATED COUNT: 12 % (ref 11.5–14.5)
GLUCOSE SERPL-MCNC: 88 MG/DL (ref 74–99)
GRAM STN SPEC: NORMAL
GRAM STN SPEC: NORMAL
HCT VFR BLD AUTO: 37.9 % (ref 36–46)
HGB BLD-MCNC: 12.2 G/DL (ref 12–16)
MCH RBC QN AUTO: 30.9 PG (ref 26–34)
MCHC RBC AUTO-ENTMCNC: 32.2 G/DL (ref 32–36)
MCV RBC AUTO: 96 FL (ref 80–100)
NRBC BLD-RTO: 0 /100 WBCS (ref 0–0)
PHOSPHATE SERPL-MCNC: 4 MG/DL (ref 2.5–4.9)
PLATELET # BLD AUTO: 228 X10*3/UL (ref 150–450)
POTASSIUM SERPL-SCNC: 4.1 MMOL/L (ref 3.5–5.3)
RBC # BLD AUTO: 3.95 X10*6/UL (ref 4–5.2)
SODIUM SERPL-SCNC: 138 MMOL/L (ref 136–145)
WBC # BLD AUTO: 4.6 X10*3/UL (ref 4.4–11.3)

## 2025-05-19 PROCEDURE — 2500000004 HC RX 250 GENERAL PHARMACY W/ HCPCS (ALT 636 FOR OP/ED): Mod: JZ | Performed by: PHYSICIAN ASSISTANT

## 2025-05-19 PROCEDURE — 1170000001 HC PRIVATE ONCOLOGY ROOM DAILY

## 2025-05-19 PROCEDURE — 99232 SBSQ HOSP IP/OBS MODERATE 35: CPT

## 2025-05-19 PROCEDURE — 36415 COLL VENOUS BLD VENIPUNCTURE: CPT | Performed by: PHYSICIAN ASSISTANT

## 2025-05-19 PROCEDURE — 2500000004 HC RX 250 GENERAL PHARMACY W/ HCPCS (ALT 636 FOR OP/ED): Performed by: PHYSICIAN ASSISTANT

## 2025-05-19 PROCEDURE — 80069 RENAL FUNCTION PANEL: CPT | Performed by: PHYSICIAN ASSISTANT

## 2025-05-19 PROCEDURE — 85027 COMPLETE CBC AUTOMATED: CPT | Performed by: PHYSICIAN ASSISTANT

## 2025-05-19 RX ADMIN — CLINDAMYCIN PHOSPHATE 600 MG: 600 INJECTION, SOLUTION INTRAVENOUS at 12:51

## 2025-05-19 RX ADMIN — CLINDAMYCIN PHOSPHATE 600 MG: 600 INJECTION, SOLUTION INTRAVENOUS at 06:44

## 2025-05-19 RX ADMIN — PIPERACILLIN SODIUM AND TAZOBACTAM SODIUM 3.38 G: 3; .375 INJECTION, SOLUTION INTRAVENOUS at 22:18

## 2025-05-19 RX ADMIN — PIPERACILLIN SODIUM AND TAZOBACTAM SODIUM 3.38 G: 3; .375 INJECTION, SOLUTION INTRAVENOUS at 10:35

## 2025-05-19 RX ADMIN — PIPERACILLIN SODIUM AND TAZOBACTAM SODIUM 3.38 G: 3; .375 INJECTION, SOLUTION INTRAVENOUS at 05:12

## 2025-05-19 RX ADMIN — ENOXAPARIN SODIUM 40 MG: 100 INJECTION SUBCUTANEOUS at 08:58

## 2025-05-19 RX ADMIN — CLINDAMYCIN PHOSPHATE 600 MG: 600 INJECTION, SOLUTION INTRAVENOUS at 18:20

## 2025-05-19 RX ADMIN — PIPERACILLIN SODIUM AND TAZOBACTAM SODIUM 3.38 G: 3; .375 INJECTION, SOLUTION INTRAVENOUS at 16:38

## 2025-05-19 ASSESSMENT — COGNITIVE AND FUNCTIONAL STATUS - GENERAL
DAILY ACTIVITIY SCORE: 24
MOBILITY SCORE: 24

## 2025-05-19 ASSESSMENT — PAIN SCALES - GENERAL
PAINLEVEL_OUTOF10: 2
PAINLEVEL_OUTOF10: 0 - NO PAIN

## 2025-05-19 ASSESSMENT — PAIN - FUNCTIONAL ASSESSMENT
PAIN_FUNCTIONAL_ASSESSMENT: 0-10
PAIN_FUNCTIONAL_ASSESSMENT: 0-10

## 2025-05-19 ASSESSMENT — PAIN DESCRIPTION - DESCRIPTORS: DESCRIPTORS: DISCOMFORT

## 2025-05-19 NOTE — CARE PLAN
The patient's goals for the shift include      The clinical goals for the shift include Patient will remain HDS throughout this shift      Problem: Safety - Adult  Goal: Free from fall injury  Outcome: Progressing     Problem: Pain - Adult  Goal: Verbalizes/displays adequate comfort level or baseline comfort level  Outcome: Progressing

## 2025-05-19 NOTE — HOSPITAL COURSE
Brief History:  Ashley Jiang is a 59 y.o. 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 Barnett and left breast TAMARA reconstruction with Dr. Reyes on 2/21/25. She has been doing well post-operatively however about 2 weeks ago she noticed an area on the upper lateral aspect at about 2 o'clock position started gettung red/irriatated and she messages our team and was prescribed a steroid cream. She stated the redness improved for a short time and then progressed and the same area got red again and started oozing. She sent a picture to our team on Monday 5/12 and was started on PO Abx. She again updated Dr. Gibbons 5/16 with a picture however had plans to travel the next few days so he decided to just keep her on PO Abx. She messaged him in the AM of 5/17 that her travel plans changed and she was staying local and he instructed her to come in and be admitted for IV Abx and imaging. She Also endorses some swelling superior to the redness into her L upper chest/armpit. She denies pain, fever, chills, nausea, vomiting, diarrhea, shortness of breath and chest pain.     Hospital Course:  Admitted 5/17 for IV antibiotics, wound culture, and CT imaging. She was initially started on vancomycin and zosyn, however subsequently developed a pruritic rash 2/2 vancomycin, which had resolved following discontinuation of vanco. She was started on clindamycin in place of vancomycin, and tolerated it well. Endorsed loose stools 5/20, so changed clindamycin to Zosyn which she tolerated well. CT chest was performed and did not show any evidence of a rim enhancing fluid collection suggestive of abscess. Cultures taken in the ED finalized 5/19 with no growth. During her hospital stay, we continued local wound care to left breast with Aquacel AG  covered by mepilex. Taken to OR on 5/21 for washout and closure and prevena placement. Cultures taken intraoperatively.     Consultations:      Day of discharge  On the day of discharge, the patient was seen and evaluated by the Plastic Surgery team and deemed suitable for discharge to home.  There were no significant events overnight. Vitals were reviewed and within normal limits. Labs were stable at discharge. On day of discharge the patient was tolerating a diet, pain was controlled on PO pain medication, was ambulating well and voiding spontaneously. The patient was given detailed discharge instructions and was scheduled to follow up as an outpatient.

## 2025-05-19 NOTE — CARE PLAN
Problem: Safety - Adult  Goal: Free from fall injury  Outcome: Progressing     Problem: Nutrition  Goal: Nutrient intake appropriate for maintaining nutritional needs  Outcome: Progressing

## 2025-05-19 NOTE — DISCHARGE INSTRUCTIONS
Plastic Surgery Post Discharge Instructions  You were admitted to [facility] on 5/17/25 for IV antibiotics due to possible left breast infection status post left breast TAMARA on 2/21/25. Included below are post-discharge instructions and details regarding follow-up.     Thank you for allowing us to participate in your care and we wish you the best!    Best Regards,  [facility]  Department of Plastic and Reconstructive Surgery    Activity:  No pushing, pulling or lifting objects greater than 5 pounds no upper extremity extension or abduction until post op visit.    You may locally bathe following discharge. Avoid soaking or submerging surgical incisions/sites or wetting wound vac dressing or device.    Surgical Site/Wound Care:  Prevena/wound vac: Please allow Prevena incisional wound vac dressing to remain in place at your left breast incision until outpatient follow-up with plastic surgery. When cleared to shower, do not allow the wound vac dressing or device to become wet. When resting, please make sure to plug in the device with the supplied power cord to maintain the battery. The device has a power button at the center which is encircled by green lights. For the first week, the [employer] [place] [employer] [place] of lights will stay illuminated green there will be one less light illuminated each day (every 24 hrs) which is to be expected, and signifies the count down of the duration of the vac device. If you experience any issues with your wound vac including alarms, malfunction or dressing issues, please refer to the provided instruction manual or contact the plastic surgery office at [phone].      Local wound care instructions: Avoid application of creams, lotions or ointments to surgical site, no soaking or scrubbing of surgical sites.     Nutrition:  You may resume a regular diet following surgery with increased protein. Ensure that you are drinking an adequate amount of fluids  to maintain hydration as well as consuming a diet high in protein and low in sugar. You may consider increasing your fiber intake to avoid constipation.    Do not smoke, as smoking delays healing and increases the risk of complications. Also be sure you are not around people that smoke for at least 6 weeks after surgery.  Second hand smoke is just as harmful as if you were to smoke.    Medication Instructions:  You may resume use of your home prescribed mediations as previously directed following discharge from the hospital. If you were taking medications prior to your surgery and they are not listed on your discharge homegoing instructions medications list, consult your MD before you resume these medications.    Some postoperative pain is not unusual. This is usually relieved by taking prescribed or over the counter Acetaminophen/Tylenol, Motrin/ibuprofen. In cases of severe pain, you may use prescribed tylenol as directed. Severe pain despite administration of pain medication must be reported to your physician.    Remember when taking Acetaminophen, do NOT exceed more than 1000 milligrams (mg) per dose or more than 4000 mg total per day. Taking too much Acetaminophen at one time can damage your liver. The maximum amount of ibuprofen in adults is 800 mg per dose or 3200 mg per day. Call your MD if you have any questions about your medications. To prevent constipation while taking narcotic pain medications, please utilize your prescribed bowel regimen, ensure that you drink plenty of water, eat fiber rich foods (a good source is fruit) and increase activity progressively.    Call Physician If:  Contact the plastic surgery office for any questions and/or concerns regarding the surgical incision/site.  1. [phone] if Monday-Friday (8 a.m. - 4:30 p.m.)  2. [phone] and ask for the Plastic Surgery team on call provider if after hours or on weekends  3. Email [email or web address] for any non-urgent  concerns and when relaying weekly progress photos of flap sites.     Call your MD or seek immediate medical attention if you experience any of the following symptoms:  1. Fever of 101.5 (38.5 C) or greater  2. Pain not controlled with prescribed pain medications  3. Uncontrolled nausea and/or vomiting  4. Drainage or swelling around your incisions and/or surgical sites   5. Separation of incisions, or tearing of the incision line  6. Large fluid collection under or around the incision or flap sites   7. Flap discoloration (including darkened appearance)  8. Difficulty breathing  9. Swelling, pain, heat and/or redness in your legs and/or calves  10. Inability to tolerate diet/fluid intake    Additional comments:  Cultures were taken intraoperatively today. We will follow up with any changes to antibiotics, if needed, once the results come back.     Follow up appointment is scheduled for 5/28 at 9am with plastic surgery.     Follow-up/Post Discharge Appointments:  Follow-up care is a key part of your treatment and safety. It is very important that you maintain follow-up care as directed so that your surgical site heals properly and does not lead to problems. Always carry a current medication list with you and bring it to ALL healthcare Provider visits. Be sure to maintain follow up with plastic surgery at your scheduled appointment. If you are unable to keep your appointment, or need to reschedule please contact our office at [phone].

## 2025-05-19 NOTE — PROGRESS NOTES
"  Division of Plastic and Reconstructive Surgery  Progress Note    Patient Name: Ashley Jiang  MRN: [id number]  Date:  05/19/25     Subjective    Patient sitting in bed comfortably this AM, had just showered. States her pain is well controlled and she is feeling much better. States she is having some loose stools from the antibiotics. She is dressing the small open superficial incisional wound site at the left lateral aspect of her breast incision with Aquacel AG and mepilex secured with a pink surgical bra. She is ambulating and voiding independently. Tolerating a regular diet. Denies fever, chills, headache, dizziness, chest pain, palpitations, dyspnea, abdominal pain, nausea or vomiting.      Objective    Vital Signs  BP 96/61 (BP Location: Right arm, Patient Position: Lying)   Pulse 64   Temp 36.3 °C (97.3 °F) (Temporal)   Resp 16   Ht 1.676 m (5' 6\")   Wt 65.4 kg (144 lb 3.3 oz)   SpO2 96%   BMI 23.27 kg/m²      Physical Exam  Constitutional: A&Ox3, calm and cooperative, NAD.  Eyes: EOMI, clear sclera.  ENMT: Moist mucous membranes.  Head/Neck: NC/AT. Neck supple.   Cardiovascular: Normal rate and regular rhythm.   Respiratory/Thorax: Breathing comfortably with regular respirations on RA. Good symmetric chest expansion.   Breast: Healed prior TAMARA free flap recipient site to left breast which is appropriate color, pale but same color as native tissue. Small (approximately [id number]) superficial wound area to lateral aspect of left breast flap incision line with wound bed that appears pale pink.  Wound is productive of a scant amount of serous drainage, no purulence.  There is periwound erythema, induration and warmth.  No palpable deep fluid collection, fluctuance or abscess.  Gastrointestinal: Abdomen soft, non-distended.   Genitourinary: Voiding independently.   Extremities: No peripheral edema. Moves all 4 extremities actively, strength appropriate.   Neurological: A&Ox3.   Psychological: Appropriate " mood and behavior.   Skin: Warm and dry, no rashes.     Current Medications  Scheduled medications  Scheduled Medications[1]  Continuous medications  Continuous Medications[2]  PRN medications  PRN Medications[3]     Assessment   Ashley Jiang is a 59 y.o. 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 Barnett and left breast TAMARA reconstruction with Dr. Reyes on 2/21/25 who was re-admitted to the plastic surgery service on 5/17/25 given c/f left breast cellulitis vs. abscess.     Plan:  # S/p L breast reconstruction via TAMARA free flap with left lateral breast cellulitis and superficial wound   - Anticipate return to OR with plastic surgery/Dr. Reyes. Case requested for Wednesday 5/21 for left breast I&D and closure.   - Continue interim daily local wound care with application of small piece of Aquacel to superficial open wound site at the left lateral breast covered by Aquacel AG and mepilex secured with pink surgical bra  - DC IV vancomycin given pruritic medication reaction and per patient request, continue IV Zosyn and clindamycin with frequent reassessment of response to IV antibiotics  - Follow-up results of bedside wound cultures obtained upon admission 5/17  - Continue Ensure and Terence protein supplements to optimize nutrition and promote wound healing  - Activity as tolerated, encourage frequent ambulation  - Assess daily labs while patient on IV antibiotics  - Monitor vital signs every 8 hours    F: Encouraged PO fluid intake, creatinine normalized on 5/20 labs.   E: Monitor and replete PRN, currently stable   N: Regular diet  GI: Daily miralax PRN     Dvt ppx:   - Lovenox 40mg daily  - SCD's  - Encourage ambulation     Disposition: Continue care on RNF pending possible OR intervention per plastic surgery (Dr. Reyes)  this week for left breast I&D and closure and pending remaining workup results (cultures) and treatment response to IV ABX.      Patient and plan discussed with Dr. Reyes.    Evy Lynn PA-C  Plastic and Reconstructive Surgery   [place]  Pager #[phone]  Team phone: [phone]          [1] clindamycin, 600 mg, intravenous, q6h  enoxaparin, 40 mg, subcutaneous, Daily  piperacillin-tazobactam, 3.375 g, intravenous, q6h  polyethylene glycol, 17 g, oral, Daily     [2]    [3] PRN medications: melatonin

## 2025-05-20 LAB
ABO GROUP (TYPE) IN BLOOD: NORMAL
ANTIBODY SCREEN: NORMAL
RH FACTOR (ANTIGEN D): NORMAL

## 2025-05-20 PROCEDURE — 1170000001 HC PRIVATE ONCOLOGY ROOM DAILY

## 2025-05-20 PROCEDURE — 2500000004 HC RX 250 GENERAL PHARMACY W/ HCPCS (ALT 636 FOR OP/ED): Mod: JZ | Performed by: PHYSICIAN ASSISTANT

## 2025-05-20 PROCEDURE — 2500000004 HC RX 250 GENERAL PHARMACY W/ HCPCS (ALT 636 FOR OP/ED): Performed by: PHYSICIAN ASSISTANT

## 2025-05-20 PROCEDURE — 99232 SBSQ HOSP IP/OBS MODERATE 35: CPT

## 2025-05-20 PROCEDURE — 86900 BLOOD TYPING SEROLOGIC ABO: CPT

## 2025-05-20 PROCEDURE — 36415 COLL VENOUS BLD VENIPUNCTURE: CPT

## 2025-05-20 RX ADMIN — PIPERACILLIN SODIUM AND TAZOBACTAM SODIUM 3.38 G: 3; .375 INJECTION, SOLUTION INTRAVENOUS at 17:38

## 2025-05-20 RX ADMIN — CLINDAMYCIN PHOSPHATE 600 MG: 600 INJECTION, SOLUTION INTRAVENOUS at 06:25

## 2025-05-20 RX ADMIN — PIPERACILLIN SODIUM AND TAZOBACTAM SODIUM 3.38 G: 3; .375 INJECTION, SOLUTION INTRAVENOUS at 12:08

## 2025-05-20 RX ADMIN — CLINDAMYCIN PHOSPHATE 600 MG: 600 INJECTION, SOLUTION INTRAVENOUS at 00:10

## 2025-05-20 RX ADMIN — ENOXAPARIN SODIUM 40 MG: 100 INJECTION SUBCUTANEOUS at 09:44

## 2025-05-20 RX ADMIN — PIPERACILLIN SODIUM AND TAZOBACTAM SODIUM 3.38 G: 3; .375 INJECTION, SOLUTION INTRAVENOUS at 05:29

## 2025-05-20 ASSESSMENT — COGNITIVE AND FUNCTIONAL STATUS - GENERAL
DAILY ACTIVITIY SCORE: 24
MOBILITY SCORE: 24

## 2025-05-20 ASSESSMENT — PAIN SCALES - GENERAL
PAINLEVEL_OUTOF10: 0 - NO PAIN
PAINLEVEL_OUTOF10: 0 - NO PAIN

## 2025-05-20 ASSESSMENT — PAIN - FUNCTIONAL ASSESSMENT
PAIN_FUNCTIONAL_ASSESSMENT: 0-10
PAIN_FUNCTIONAL_ASSESSMENT: 0-10

## 2025-05-20 NOTE — PROGRESS NOTES
Spiritual Care Visit  Spiritual Care Request    Reason for Visit:  Routine Visit: Introduction     Request Received From:  Referral From: Family    Focus of Care:  Visited With: Patient and family together       Refer to :  Referral To:        Spiritual Care Annotation    Annotation:  While rounding with facility dog [place], a family member of patient requested a visit.  introduced self and role to patient Ashley Jiang, and provided hand  to her and family member before and after visit with facility dog. Patient expressed she was doing okay today and did not have any needs. They were appreciative of visit. Spiritual Care remains available as needed/requested.    Rev. Yvonne Prater MDiv, BCC

## 2025-05-20 NOTE — PROGRESS NOTES
"  Division of Plastic and Reconstructive Surgery  Progress Note    Patient Name: Ashley Jiang  MRN: [id number]  Date:  05/20/25     Subjective    Patient sitting in bed comfortably this AM. Patient aware of plan for OR tomorrow for L breast I&D and closure. States she is having some loose stools from the antibiotics, Clindamycin was stopped this AM, will continue to monitor. Small open superficial incisional wound site at the left lateral aspect of her breast incision with Aquacel AG and mepilex secured with a pink surgical bra. She is ambulating and voiding independently. Tolerating a regular diet. Denies fever, chills, headache, dizziness, chest pain, palpitations, dyspnea, abdominal pain, nausea or vomiting.      Objective    Vital Signs  /67 (BP Location: Right arm, Patient Position: Sitting)   Pulse 69   Temp 36.3 °C (97.3 °F) (Temporal)   Resp 17   Ht 1.676 m (5' 6\")   Wt 65.3 kg (144 lb)   SpO2 96%   BMI 23.24 kg/m²      Physical Exam  Constitutional: A&Ox3, calm and cooperative, NAD.  Eyes: EOMI, clear sclera.  ENMT: Moist mucous membranes.  Head/Neck: NC/AT. Neck supple.   Cardiovascular: Normal rate and regular rhythm.   Respiratory/Thorax: Breathing comfortably with regular respirations on RA. Good symmetric chest expansion.   Breast: Healed prior TAMARA free flap recipient site to left breast which is appropriate color, pale but same color as native tissue. Small (approximately [id number]) superficial wound area to lateral aspect of left breast flap incision line with wound bed that appears pale pink.  Wound is productive of a mild amount of bloody drainage. There is mild eduardo-wound erythema and warmth.  No palpable deep fluid collection, fluctuance or abscess.  Gastrointestinal: Abdomen soft, non-distended.   Genitourinary: Voiding independently.   Extremities: No peripheral edema. Moves all 4 extremities actively, strength appropriate.   Neurological: A&Ox3.   Psychological: Appropriate mood " and behavior.   Skin: Warm and dry, no rashes.     Current Medications  Scheduled medications  Scheduled Medications[1]  Continuous medications  Continuous Medications[2]  PRN medications  PRN Medications[3]     Assessment   Ashley Jiang is a 59 y.o. 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 Barnett and left breast TAMARA reconstruction with Dr. Reyes on 2/21/25 who was re-admitted to the plastic surgery service on 5/17/25 given c/f left breast cellulitis vs. abscess.     Plan:  # S/p L breast reconstruction via TAMARA free flap with left lateral breast cellulitis and superficial wound   - RTOR Wednesday 5/21 for left breast I&D and closure, NPO and T/S placed  - Continue interim daily local wound care with application of small piece of Aquacel to superficial open wound site at the left lateral breast covered by Aquacel AG and mepilex secured with pink surgical bra  - DC IV vancomycin given pruritic medication reaction, DC Clindamycin given loose stools, continue IV Zosyn with frequent reassessment of response to IV antibiotics  - Final bedside wound cultures 5/17 with No growth  - Continue Ensure and Terence protein supplements to optimize nutrition and promote wound healing  - Activity as tolerated, encourage frequent ambulation  - Assess daily labs while patient on IV antibiotics  - Monitor vital signs every 8 hours    F: Encouraged PO fluid intake, creatinine normalized on 5/20 labs.   E: Monitor and replete PRN, currently stable   N: Regular diet  GI: Daily miralax PRN     DVT ppx:   - Lovenox 40mg daily  - SCD's  - Encourage ambulation     Disposition: Continue care on RNF pending OR intervention per plastic surgery (Dr. Reyes) 5/21 for left breast I&D and closure treatment response to IV ABX.      Patient and plan  discussed with Dr. Reyes.    Yale Burroughs PA-C  Plastic and Reconstructive Surgery   [place]  Pager #[phone]  Team phone: [phone]          [1] enoxaparin, 40 mg, subcutaneous, Daily  piperacillin-tazobactam, 3.375 g, intravenous, q6h  [Held by provider] polyethylene glycol, 17 g, oral, Daily     [2]    [3] PRN medications: melatonin

## 2025-05-21 ENCOUNTER — ANESTHESIA (OUTPATIENT)
Dept: OPERATING ROOM | Facility: HOSPITAL | Age: 60
End: 2025-05-21
Payer: COMMERCIAL

## 2025-05-21 ENCOUNTER — PHARMACY VISIT (OUTPATIENT)
Dept: PHARMACY | Facility: CLINIC | Age: 60
End: 2025-05-21
Payer: COMMERCIAL

## 2025-05-21 ENCOUNTER — ANESTHESIA EVENT (OUTPATIENT)
Dept: OPERATING ROOM | Facility: HOSPITAL | Age: 60
End: 2025-05-21
Payer: COMMERCIAL

## 2025-05-21 VITALS
TEMPERATURE: 98.2 F | RESPIRATION RATE: 17 BRPM | BODY MASS INDEX: 23.14 KG/M2 | HEIGHT: 66 IN | HEART RATE: 74 BPM | OXYGEN SATURATION: 95 % | DIASTOLIC BLOOD PRESSURE: 73 MMHG | WEIGHT: 143.96 LBS | SYSTOLIC BLOOD PRESSURE: 145 MMHG

## 2025-05-21 LAB
ALBUMIN SERPL BCP-MCNC: 4.2 G/DL (ref 3.4–5)
ANION GAP SERPL CALC-SCNC: 13 MMOL/L (ref 10–20)
BACTERIA BLD CULT: NORMAL
BUN SERPL-MCNC: 28 MG/DL (ref 6–23)
CALCIUM SERPL-MCNC: 9.7 MG/DL (ref 8.6–10.6)
CHLORIDE SERPL-SCNC: 103 MMOL/L (ref 98–107)
CO2 SERPL-SCNC: 26 MMOL/L (ref 21–32)
CREAT SERPL-MCNC: 0.74 MG/DL (ref 0.5–1.05)
EGFRCR SERPLBLD CKD-EPI 2021: >90 ML/MIN/1.73M*2
ERYTHROCYTE [DISTWIDTH] IN BLOOD BY AUTOMATED COUNT: 11.9 % (ref 11.5–14.5)
GLUCOSE SERPL-MCNC: 91 MG/DL (ref 74–99)
HCT VFR BLD AUTO: 39.5 % (ref 36–46)
HGB BLD-MCNC: 12.9 G/DL (ref 12–16)
MCH RBC QN AUTO: 30.3 PG (ref 26–34)
MCHC RBC AUTO-ENTMCNC: 32.7 G/DL (ref 32–36)
MCV RBC AUTO: 93 FL (ref 80–100)
NRBC BLD-RTO: 0 /100 WBCS (ref 0–0)
PHOSPHATE SERPL-MCNC: 4 MG/DL (ref 2.5–4.9)
PLATELET # BLD AUTO: 245 X10*3/UL (ref 150–450)
POTASSIUM SERPL-SCNC: 4 MMOL/L (ref 3.5–5.3)
RBC # BLD AUTO: 4.26 X10*6/UL (ref 4–5.2)
SODIUM SERPL-SCNC: 138 MMOL/L (ref 136–145)
WBC # BLD AUTO: 5.6 X10*3/UL (ref 4.4–11.3)

## 2025-05-21 PROCEDURE — 87070 CULTURE OTHR SPECIMN AEROBIC: CPT | Performed by: PHYSICIAN ASSISTANT

## 2025-05-21 PROCEDURE — RXMED WILLOW AMBULATORY MEDICATION CHARGE

## 2025-05-21 PROCEDURE — 3600000008 HC OR TIME - EACH INCREMENTAL 1 MINUTE - PROCEDURE LEVEL THREE

## 2025-05-21 PROCEDURE — 36415 COLL VENOUS BLD VENIPUNCTURE: CPT

## 2025-05-21 PROCEDURE — 11042 DBRDMT SUBQ TIS 1ST 20SQCM/<: CPT

## 2025-05-21 PROCEDURE — 2500000004 HC RX 250 GENERAL PHARMACY W/ HCPCS (ALT 636 FOR OP/ED): Mod: JZ | Performed by: PHYSICIAN ASSISTANT

## 2025-05-21 PROCEDURE — 7100000001 HC RECOVERY ROOM TIME - INITIAL BASE CHARGE

## 2025-05-21 PROCEDURE — 80069 RENAL FUNCTION PANEL: CPT

## 2025-05-21 PROCEDURE — 2720000007 HC OR 272 NO HCPCS

## 2025-05-21 PROCEDURE — 2500000005 HC RX 250 GENERAL PHARMACY W/O HCPCS: Performed by: ANESTHESIOLOGIST ASSISTANT

## 2025-05-21 PROCEDURE — 85027 COMPLETE CBC AUTOMATED: CPT

## 2025-05-21 PROCEDURE — 7100000002 HC RECOVERY ROOM TIME - EACH INCREMENTAL 1 MINUTE

## 2025-05-21 PROCEDURE — A12002 PR RESUP NPTERF WND BODY 2.6-7.5 CM: Performed by: ANESTHESIOLOGY

## 2025-05-21 PROCEDURE — 99239 HOSP IP/OBS DSCHRG MGMT >30: CPT

## 2025-05-21 PROCEDURE — 3700000002 HC GENERAL ANESTHESIA TIME - EACH INCREMENTAL 1 MINUTE

## 2025-05-21 PROCEDURE — 2500000004 HC RX 250 GENERAL PHARMACY W/ HCPCS (ALT 636 FOR OP/ED): Mod: JZ | Performed by: ANESTHESIOLOGIST ASSISTANT

## 2025-05-21 PROCEDURE — A12002 PR RESUP NPTERF WND BODY 2.6-7.5 CM: Performed by: ANESTHESIOLOGIST ASSISTANT

## 2025-05-21 PROCEDURE — 0JB60ZZ EXCISION OF CHEST SUBCUTANEOUS TISSUE AND FASCIA, OPEN APPROACH: ICD-10-PCS

## 2025-05-21 PROCEDURE — 87102 FUNGUS ISOLATION CULTURE: CPT | Performed by: PHYSICIAN ASSISTANT

## 2025-05-21 PROCEDURE — 2500000005 HC RX 250 GENERAL PHARMACY W/O HCPCS: Mod: JZ

## 2025-05-21 PROCEDURE — 3700000001 HC GENERAL ANESTHESIA TIME - INITIAL BASE CHARGE

## 2025-05-21 PROCEDURE — 87205 SMEAR GRAM STAIN: CPT | Performed by: PHYSICIAN ASSISTANT

## 2025-05-21 PROCEDURE — 3600000003 HC OR TIME - INITIAL BASE CHARGE - PROCEDURE LEVEL THREE

## 2025-05-21 RX ORDER — CEFAZOLIN 1 G/1
INJECTION, POWDER, FOR SOLUTION INTRAVENOUS AS NEEDED
Status: DISCONTINUED | OUTPATIENT
Start: 2025-05-21 | End: 2025-05-21

## 2025-05-21 RX ORDER — MIDAZOLAM HYDROCHLORIDE 1 MG/ML
INJECTION INTRAMUSCULAR; INTRAVENOUS AS NEEDED
Status: DISCONTINUED | OUTPATIENT
Start: 2025-05-21 | End: 2025-05-21

## 2025-05-21 RX ORDER — FENTANYL CITRATE 50 UG/ML
25 INJECTION, SOLUTION INTRAMUSCULAR; INTRAVENOUS EVERY 5 MIN PRN
Status: DISCONTINUED | OUTPATIENT
Start: 2025-05-21 | End: 2025-05-21 | Stop reason: HOSPADM

## 2025-05-21 RX ORDER — METOCLOPRAMIDE HYDROCHLORIDE 5 MG/ML
10 INJECTION INTRAMUSCULAR; INTRAVENOUS ONCE AS NEEDED
Status: DISCONTINUED | OUTPATIENT
Start: 2025-05-21 | End: 2025-05-21 | Stop reason: HOSPADM

## 2025-05-21 RX ORDER — MEPERIDINE HYDROCHLORIDE 25 MG/ML
12.5 INJECTION INTRAMUSCULAR; INTRAVENOUS; SUBCUTANEOUS EVERY 10 MIN PRN
Status: DISCONTINUED | OUTPATIENT
Start: 2025-05-21 | End: 2025-05-21 | Stop reason: HOSPADM

## 2025-05-21 RX ORDER — LIDOCAINE HYDROCHLORIDE 10 MG/ML
0.1 INJECTION, SOLUTION INFILTRATION; PERINEURAL ONCE
Status: DISCONTINUED | OUTPATIENT
Start: 2025-05-21 | End: 2025-05-21 | Stop reason: HOSPADM

## 2025-05-21 RX ORDER — PHENYLEPHRINE HCL IN 0.9% NACL 0.4MG/10ML
SYRINGE (ML) INTRAVENOUS AS NEEDED
Status: DISCONTINUED | OUTPATIENT
Start: 2025-05-21 | End: 2025-05-21

## 2025-05-21 RX ORDER — SULFAMETHOXAZOLE AND TRIMETHOPRIM 800; 160 MG/1; MG/1
1 TABLET ORAL 2 TIMES DAILY
Qty: 14 TABLET | Refills: 0 | Status: SHIPPED | OUTPATIENT
Start: 2025-05-21 | End: 2025-05-21 | Stop reason: HOSPADM

## 2025-05-21 RX ORDER — ACETAMINOPHEN 325 MG/1
650 TABLET ORAL EVERY 8 HOURS PRN
Qty: 30 TABLET | Refills: 0 | Status: SHIPPED | OUTPATIENT
Start: 2025-05-21 | End: 2025-06-04

## 2025-05-21 RX ORDER — ROCURONIUM BROMIDE 10 MG/ML
INJECTION, SOLUTION INTRAVENOUS AS NEEDED
Status: DISCONTINUED | OUTPATIENT
Start: 2025-05-21 | End: 2025-05-21

## 2025-05-21 RX ORDER — ONDANSETRON HYDROCHLORIDE 2 MG/ML
INJECTION, SOLUTION INTRAVENOUS AS NEEDED
Status: DISCONTINUED | OUTPATIENT
Start: 2025-05-21 | End: 2025-05-21

## 2025-05-21 RX ORDER — LIDOCAINE HYDROCHLORIDE 20 MG/ML
INJECTION, SOLUTION INFILTRATION; PERINEURAL AS NEEDED
Status: DISCONTINUED | OUTPATIENT
Start: 2025-05-21 | End: 2025-05-21

## 2025-05-21 RX ORDER — SODIUM CHLORIDE 0.9 G/100ML
INJECTION, SOLUTION IRRIGATION AS NEEDED
Status: DISCONTINUED | OUTPATIENT
Start: 2025-05-21 | End: 2025-05-21 | Stop reason: HOSPADM

## 2025-05-21 RX ORDER — FENTANYL CITRATE 50 UG/ML
50 INJECTION, SOLUTION INTRAMUSCULAR; INTRAVENOUS EVERY 5 MIN PRN
Status: DISCONTINUED | OUTPATIENT
Start: 2025-05-21 | End: 2025-05-21 | Stop reason: HOSPADM

## 2025-05-21 RX ORDER — FENTANYL CITRATE 50 UG/ML
INJECTION, SOLUTION INTRAMUSCULAR; INTRAVENOUS AS NEEDED
Status: DISCONTINUED | OUTPATIENT
Start: 2025-05-21 | End: 2025-05-21

## 2025-05-21 RX ORDER — PROPOFOL 10 MG/ML
INJECTION, EMULSION INTRAVENOUS AS NEEDED
Status: DISCONTINUED | OUTPATIENT
Start: 2025-05-21 | End: 2025-05-21

## 2025-05-21 RX ORDER — AMOXICILLIN AND CLAVULANATE POTASSIUM 875; 125 MG/1; MG/1
875 TABLET, FILM COATED ORAL 2 TIMES DAILY
Qty: 14 TABLET | Refills: 0 | Status: SHIPPED | OUTPATIENT
Start: 2025-05-21 | End: 2025-05-28

## 2025-05-21 RX ADMIN — ROCURONIUM BROMIDE 50 MG: 10 INJECTION, SOLUTION INTRAVENOUS at 09:19

## 2025-05-21 RX ADMIN — PIPERACILLIN SODIUM AND TAZOBACTAM SODIUM 3.38 G: 3; .375 INJECTION, SOLUTION INTRAVENOUS at 00:07

## 2025-05-21 RX ADMIN — Medication 40 MCG: at 09:54

## 2025-05-21 RX ADMIN — MIDAZOLAM HYDROCHLORIDE 2 MG: 2 INJECTION, SOLUTION INTRAMUSCULAR; INTRAVENOUS at 08:52

## 2025-05-21 RX ADMIN — PROPOFOL 150 MG: 10 INJECTION, EMULSION INTRAVENOUS at 09:18

## 2025-05-21 RX ADMIN — Medication 80 MCG: at 09:19

## 2025-05-21 RX ADMIN — PIPERACILLIN SODIUM AND TAZOBACTAM SODIUM 3.38 G: 3; .375 INJECTION, SOLUTION INTRAVENOUS at 06:04

## 2025-05-21 RX ADMIN — PROPOFOL 10 MG: 10 INJECTION, EMULSION INTRAVENOUS at 10:06

## 2025-05-21 RX ADMIN — LIDOCAINE HYDROCHLORIDE 100 MG: 20 INJECTION, SOLUTION INFILTRATION; PERINEURAL at 09:18

## 2025-05-21 RX ADMIN — FENTANYL CITRATE 25 MCG: 50 INJECTION, SOLUTION INTRAMUSCULAR; INTRAVENOUS at 09:26

## 2025-05-21 RX ADMIN — DEXAMETHASONE SODIUM PHOSPHATE 4 MG: 4 INJECTION INTRA-ARTICULAR; INTRALESIONAL; INTRAMUSCULAR; INTRAVENOUS; SOFT TISSUE at 09:30

## 2025-05-21 RX ADMIN — ONDANSETRON 4 MG: 2 INJECTION INTRAMUSCULAR; INTRAVENOUS at 10:01

## 2025-05-21 RX ADMIN — SUGAMMADEX 200 MG: 100 INJECTION, SOLUTION INTRAVENOUS at 10:10

## 2025-05-21 RX ADMIN — CEFAZOLIN 2 G: 1 INJECTION, POWDER, FOR SOLUTION INTRAMUSCULAR; INTRAVENOUS at 09:30

## 2025-05-21 RX ADMIN — Medication 50 MG: at 09:18

## 2025-05-21 SDOH — HEALTH STABILITY: MENTAL HEALTH: CURRENT SMOKER: 0

## 2025-05-21 ASSESSMENT — PAIN SCALES - GENERAL
PAINLEVEL_OUTOF10: 0 - NO PAIN
PAINLEVEL_OUTOF10: 0 - NO PAIN
PAIN_LEVEL: 0
PAINLEVEL_OUTOF10: 0 - NO PAIN

## 2025-05-21 ASSESSMENT — COGNITIVE AND FUNCTIONAL STATUS - GENERAL
DAILY ACTIVITIY SCORE: 24
MOBILITY SCORE: 24

## 2025-05-21 ASSESSMENT — PAIN - FUNCTIONAL ASSESSMENT
PAIN_FUNCTIONAL_ASSESSMENT: 0-10

## 2025-05-21 NOTE — CARE PLAN
The clinical goals for the shift include Pt will remain HDS and be NPO at midnight for surgery 5/21      Problem: Pain - Adult  Goal: Verbalizes/displays adequate comfort level or baseline comfort level  Outcome: Progressing     Problem: Safety - Adult  Goal: Free from fall injury  Outcome: Progressing     Problem: Discharge Planning  Goal: Discharge to home or other facility with appropriate resources  Outcome: Progressing     Problem: Chronic Conditions and Co-morbidities  Goal: Patient's chronic conditions and co-morbidity symptoms are monitored and maintained or improved  Outcome: Progressing     Problem: Nutrition  Goal: Nutrient intake appropriate for maintaining nutritional needs  Outcome: Progressing

## 2025-05-21 NOTE — ANESTHESIA PREPROCEDURE EVALUATION
Patient: Ashley Jiang    Procedure Information       Anesthesia Start Date/Time: 05/21/25 0852    Procedure: Left breast irrigation and debridement (Left)    Location: [facility] OR 06 / Virtual [facility] OR    Surgeons: Emilie Reyes MD            Relevant Problems   Cardiac   (+) Hyperlipidemia, mild      Neuro   (+) Major depression in remission   (+) Seasonal affective disorder       Clinical information reviewed:   Tobacco  Allergies  Meds   Med Hx  Surg Hx   Fam Hx  Soc Hx        NPO Detail:  NPO/Void Status  Carbohydrate Drink Given Prior to Surgery? : N  Date of Last Liquid: 05/20/25  Time of Last Liquid: 2200  Date of Last Solid: 05/20/25  Time of Last Solid: 2200  Last Intake Type: Clear fluids         Physical Exam    Airway  Mallampati: II  TM distance: >3 FB  Neck ROM: full  Mouth opening: 3 or more finger widths     Cardiovascular   Rhythm: regular     Dental    Pulmonary    Abdominal            Anesthesia Plan    History of general anesthesia?: yes  History of complications of general anesthesia?: no    ASA 2     general     The patient is not a current smoker.    intravenous induction   Anesthetic plan and risks discussed with patient.  Use of blood products discussed with patient who consented to blood products.    Plan discussed with CAA and attending.

## 2025-05-21 NOTE — OP NOTE
Left breast irrigation and debridement (L) Operative Note     Date: 2025  OR Location: [facility] OR    Name: Ashley Jiang, : 1965, Age: 59 y.o., MRN: [id number], Sex: female    Diagnosis  Pre-op Diagnosis      * Cellulitis of breast [N61.0] Post-op Diagnosis     * Cellulitis of breast [N61.0]     Procedures  87005  13301      Surgeons      * Emilie Reyes - Primary    Resident/Fellow/Other Assistant:  Surgeons and Role:     * KANWAL VarelaC - SHAY[facility] First Assist    Staff:   Circulator: Jyoti  Scrub Person: Taty  Circulator: Saurav  Scrub Person: Kell    Anesthesia Staff: Anesthesiologist: Luz Tay MD  C-AA: CARLOS Lai    Procedure Summary  Anesthesia: General  ASA: II  Estimated Blood Loss: 1mL  Intra-op Medications:   Administrations occurring from 0800 to 1000 on 25:   Medication Name Total Dose   sodium chloride 0.9 % irrigation solution 1,000 mL   enoxaparin (Lovenox) syringe 40 mg Cannot be calculated   polyethylene glycol (Glycolax, Miralax) packet 17 g Cannot be calculated   ceFAZolin (Ancef) vial 1 g 2 g   dexAMETHasone (Decadron) 4 mg/mL 4 mg   fentaNYL (Sublimaze) injection 50 mcg/mL 25 mcg   ketamine injection 50 mg/ 5 mL (10 mg/mL) 50 mg   lidocaine (Xylocaine) injection 2 % 100 mg   midazolam PF (Versed) injection 1 mg/mL 2 mg   phenylephrine 40 mcg/mL syringe 10 mL 120 mcg   propofol (Diprivan) injection 10 mg/mL 150 mg   rocuronium (ZeMuron) 50 mg/5 mL injection 50 mg              Anesthesia Record               Intraprocedure I/O Totals       None           Specimen:   ID Type Source Tests Collected by Time   A : LEFT BREAST SWAB Swab ABSCESS FUNGAL CULTURE/SMEAR, TISSUE/WOUND CULTURE/SMEAR Emilie Reyes MD 2025 0943                 Drains and/or Catheters:   [REMOVED] Closed/Suction Drain 1 Lateral RLQ Bulb 19 Fr. (Removed)       [REMOVED] Closed/Suction Drain 2 Left;Anterior Hip 19 Fr. (Removed)       [REMOVED] Closed/Suction Drain 3 Lateral  LUQ Bulb (Removed)         Findings: Wound breakdown measuring 1 cm x 0.5 cm. Surrounding erythema measured 4 cm x 1 cm. The ret of surrounding skin is normal.   Debridement surface area measured 4 cm x 1 cm. Wound closure Simple: 4 cm in length.     Indications: Ashley Jiang is an 59 y.o. female who is having surgery for Cellulitis of breast [N61.0]. And wound breakdown.     The patient was seen in the preoperative area. The risks, benefits, complications, treatment options, non-operative alternatives, expected recovery and outcomes were discussed with the patient. The possibilities of reaction to medication, pulmonary aspiration, injury to surrounding structures, bleeding, recurrent infection, the need for additional procedures, failure to diagnose a condition, and creating a complication requiring transfusion or operation were discussed with the patient. The patient concurred with the proposed plan, giving informed consent.  The site of surgery was properly noted/marked if necessary per policy. The patient has been actively warmed in preoperative area. Preoperative antibiotics have been ordered and given within 1 hours of incision. Venous thrombosis prophylaxis have been ordered including bilateral sequential compression devices    Procedure Details:   The patient was greeted in the preoperative holding area.  I discussed with patient the planned procedure, indications, contraindications, alternatives and possible complications.  The patient expressed good understanding and agreed to proceed with surgery.  Surgical consent was then obtained.  After that, the patient was transferred on her bed to operating theater, and as soon as she entered the operating room standard safety huddle was performed.  And the patient was thus identified by 3 identifiers.  And then, the planned procedure and its laterality, allergies, need for preoperative antibiotics, allergies, DVT prophylaxis protocol, and duration of surgery were  all reviewed.  All in the room agreed to proceed with the procedure.  The patient was then transferred to operating bed and there she was placed supine and she was held secured by safety belt.  Sequential compression devices were applied to both legs.  All bony prominences were well-padded to avoid pressure sores.  General anesthesia with endotracheal intubation was performed by anesthesia personnel.  The dressing was then taken down, swab culture was obtained, and the surgical site was prepped with Betadine paint and draped in standard sterile fashion.  Timeout was then performed, and all in the room agreed to proceed with surgery.  Patient was given about his before incision.    There was wound dehiscence limited to skin and subcutaneous tissue measuring 1 cm x 0.5 cm surrounded by erythema measuring 4 cm in length and 1 cm at its maximum width.  The wound in question with erythema was limited to the mastectomy skin.  I outlined the erythematous area which has the wound within it, and I planned full thickness excision including skin down to subcutaneous tissue in crescent shape.  The excision was performed with 15 blade scalpel down to subcutaneous tissue and included superficial layer of th subcutaneous tissue. Underlying tissue was healthy with no signs of infection.  Irrigation was then performed with Irrisept and irrigation saline.  After that, the wound was closed with 4 0 monocryl in buried simple interrupted fashion for the deep dermal repair and 4/0 Monocryl for skin closure in subcuticular repair.     Evidence of Infection: No   Complications:  None; patient tolerated the procedure well.    Disposition: PACU - hemodynamically stable.  Condition: stable         Task Performed by SHAYNA First Assist or Physician Assistant:   PA/NP, was necessary to assist on this case due to the nature of the case and lack of qualified resident physician. During the case the PA served as my assist by helping me with tissue  retraction, hemostasis, suction, and application of wound VAC.     Attending Attestation: I performed the procedure.    Emilie Reyes  Phone Number: [phone]

## 2025-05-21 NOTE — ANESTHESIA POSTPROCEDURE EVALUATION
Patient: Ashley Jiang    Procedure Summary       Date: 05/21/25 Room / Location: [facility] OR 06 / [facility] OR    Anesthesia Start: 0852 Anesthesia Stop: 1026    Procedure: Left breast irrigation and debridement (Left: Breast) Diagnosis:       Cellulitis of breast      (Cellulitis of breast [N61.0])    Surgeons: Emilie Reyes MD Responsible Provider: Luz Tay MD    Anesthesia Type: general ASA Status: 2            Anesthesia Type: general    Vitals Value Taken Time   /76 05/21/25 10:27   Temp 36.7 05/21/25 10:27   Pulse 76 05/21/25 10:27   Resp 15 05/21/25 10:27   SpO2 98 05/21/25 10:27       Anesthesia Post Evaluation    Patient participation: complete - patient participated  Level of consciousness: awake  Pain score: 0  Pain management: adequate  Airway patency: patent  Cardiovascular status: acceptable  Respiratory status: acceptable and face mask  Hydration status: acceptable  Postoperative Nausea and Vomiting: none        No notable events documented.

## 2025-05-21 NOTE — ANESTHESIA PROCEDURE NOTES
Airway  Date/Time: 5/21/2025 9:21 AM  Reason: elective    Airway not difficult    Staffing  Performed: PAULINE   Authorized by: Luz Tay MD    Performed by: CARLOS Lai  Patient location during procedure: OR    Patient Condition  Indications for airway management: anesthesia and airway protection  Patient position: sniffing  MILS not maintained throughout  Planned trial extubation  Sedation level: deep     Final Airway Details   Preoxygenated: yes  Final airway type: endotracheal airway  Successful airway: ETT  Cuffed: yes   Successful intubation technique: direct laryngoscopy  Adjuncts used in placement: intubating stylet  Endotracheal tube insertion site: oral  Blade: Landy  Blade size: #3  ETT size (mm): 7.0  Cormack-Lehane Classification: grade I - full view of glottis  Placement verified by: chest auscultation and capnometry   Measured from: lips  ETT to lips (cm): 21  Ventilation between attempts: none  Number of attempts at approach: 1  Number of other approaches attempted: 0

## 2025-05-21 NOTE — DISCHARGE SUMMARY
Discharge Diagnosis  Cellulitis of breast    Issues Requiring Follow-Up  Follow up finalized culture results.   Follow up with plastics [facility] for vac removal in 1 week.     Test Results Pending At Discharge  Pending Labs       Order Current Status    Fungal Culture/Smear Collected (05/21/25 0926)    Tissue/Wound Culture/Smear Collected (05/21/25 0926)    Blood Culture Preliminary result            Hospital Course  Brief History:  Ashley Jiang is a 59 y.o. 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 Barnett and left breast TAMARA reconstruction with Dr. Reyes on 2/21/25. She has been doing well post-operatively however about 2 weeks ago she noticed an area on the upper lateral aspect at about 2 o'clock position started gettung red/irriatated and she messages our team and was prescribed a steroid cream. She stated the redness improved for a short time and then progressed and the same area got red again and started oozing. She sent a picture to our team on Monday 5/12 and was started on PO Abx. She again updated Dr. Gibbons 5/16 with a picture however had plans to travel the next few days so he decided to just keep her on PO Abx. She messaged him in the AM of 5/17 that her travel plans changed and she was staying local and he instructed her to come in and be admitted for IV Abx and imaging. She Also endorses some swelling superior to the redness into her L upper chest/armpit. She denies pain, fever, chills, nausea, vomiting, diarrhea, shortness of breath and chest pain.     Hospital Course:  Admitted 5/17 for IV antibiotics, wound culture, and CT imaging. She was initially started on vancomycin and zosyn, however subsequently developed a pruritic rash 2/2 vancomycin, which had resolved following discontinuation of  vanco. She was started on clindamycin in place of vancomycin, and tolerated it well. Endorsed loose stools 5/20, so changed clindamycin to Zosyn which she tolerated well. CT chest was performed and did not show any evidence of a rim enhancing fluid collection suggestive of abscess. Cultures taken in the ED finalized 5/19 with no growth. During her hospital stay, we continued local wound care to left breast with Aquacel AG covered by mepilex. Taken to OR on 5/21 for washout and closure and prevena placement. Cultures taken intraoperatively.     Consultations:      Day of discharge  On the day of discharge, the patient was seen and evaluated by the Plastic Surgery team and deemed suitable for discharge to home.  There were no significant events overnight. Vitals were reviewed and within normal limits. Labs were stable at discharge. On day of discharge the patient was tolerating a diet, pain was controlled on PO pain medication, was ambulating well and voiding spontaneously. The patient was given detailed discharge instructions and was scheduled to follow up as an outpatient.      Pertinent Physical Exam At Time of Discharge  Physical Exam  Constitutional: A&Ox3, calm and cooperative, NAD.  Eyes: PERRL, EOMI  ENMT: Moist mucous membranes, no apparent injuries or lesions.  Head/Neck: NC/AT.   Cardiovascular: Normal rate and regular rhythm. 2+ equal pulses of the distal extremities.  Respiratory/Thorax: CTAB, regular respirations on RA. Good symmetric chest expansion.   Gastrointestinal: Abdomen soft, appropriately tender, no peritoneal signs, +BS x 4,   Genitourinary: voiding independently   Extremities: No peripheral edema. Moving all 4 extremities actively.   Neurological: A&Ox3.   Psychological: Appropriate mood and behavior.     Focused exam of Left Breast: Prevena wound vac intact and holding suction with no alarms or leaks.       Home Medications     Medication List      START taking these medications      acetaminophen 325 mg tablet; Commonly known as: TylenoL; Take 2 tablets   (650 mg) by mouth every 8 hours if needed for mild pain (1 - 3) for up to   14 days.   amoxicillin-clavulanate 875-125 mg tablet; Commonly known as: Augmentin;   Take 1 tablet (875 mg) by mouth 2 times a day for 7 days.     CONTINUE taking these medications     multivitamin tablet     STOP taking these medications     gabapentin 100 mg capsule; Commonly known as: Neurontin   Terence (with collagen) 7-7-1.5 gram powder in packet; Generic drug:   [id number]-[id number]-LnNNG-collag-mv-min   methocarbamol 500 mg tablet; Commonly known as: Robaxin   sulfamethoxazole-trimethoprim 800-160 mg tablet; Commonly known as:   Bactrim DS       Outpatient Follow-Up  Future Appointments   Date Time Provider Department Center   5/28/2025  1:00 PM PLASTIC SURG [id number] [facility] [id number] [facility]   6/2/2025 11:30 AM PLASTIC SURG [id number] [facility] [id number] [facility]   [facility]   6/23/2025 11:30 AM TYRONE Gould-CNP [id number] [facility]   6/23/2025  3:30 PM Emilie Reyes MD [id number] [facility]       Evy Lynn PA-C

## 2025-05-21 NOTE — ANESTHESIA PROCEDURE NOTES
Peripheral IV  Date/Time: 5/21/2025 9:32 AM      Placement  Needle size: 20 G  Laterality: right  Location: hand  Site prep: alcohol  Technique: anatomical landmarks  Attempts: 1

## 2025-05-21 NOTE — NURSING NOTE
Ashley Jiang discharged at 2:06 PM  and 05/21/25   Patient discharged home via personal vehicle.  Discharge education and teaching completed with Ashley Jiang and spouse, Esteban Vázquez sent home with pt  IV removed.   RN signature: Aruna CARDONA

## 2025-05-23 LAB
BACTERIA SPEC CULT: NORMAL
FUNGUS SPEC CULT: NORMAL
FUNGUS SPEC FUNGUS STN: NORMAL
GRAM STN SPEC: NORMAL
GRAM STN SPEC: NORMAL

## 2025-05-27 LAB
FUNGUS SPEC CULT: NORMAL
FUNGUS SPEC FUNGUS STN: NORMAL

## 2025-05-28 ENCOUNTER — APPOINTMENT (OUTPATIENT)
Dept: PLASTIC SURGERY | Facility: CLINIC | Age: 60
End: 2025-05-28
Payer: COMMERCIAL

## 2025-05-28 VITALS
HEART RATE: 76 BPM | BODY MASS INDEX: 23.78 KG/M2 | DIASTOLIC BLOOD PRESSURE: 78 MMHG | RESPIRATION RATE: 16 BRPM | WEIGHT: 148 LBS | HEIGHT: 66 IN | SYSTOLIC BLOOD PRESSURE: 121 MMHG

## 2025-05-28 DIAGNOSIS — Z46.89 ENCOUNTER FOR MANAGEMENT OF VACUUM-ASSISTED CLOSURE (VAC) OF WOUND: ICD-10-CM

## 2025-05-28 DIAGNOSIS — Z48.89 ENCOUNTER FOR POSTOPERATIVE WOUND CHECK: Primary | ICD-10-CM

## 2025-05-28 DIAGNOSIS — Z48.01 ENCOUNTER FOR CHANGE OR REMOVAL OF SURGICAL WOUND DRESSING: ICD-10-CM

## 2025-05-28 PROCEDURE — 99024 POSTOP FOLLOW-UP VISIT: CPT

## 2025-05-28 PROCEDURE — 3008F BODY MASS INDEX DOCD: CPT | Performed by: PHYSICIAN ASSISTANT

## 2025-05-28 RX ORDER — CALCIUM CARBONATE 500(1250)
1 TABLET ORAL
COMMUNITY

## 2025-05-28 RX ORDER — ACETAMINOPHEN 500 MG
125 TABLET ORAL DAILY
COMMUNITY

## 2025-05-28 ASSESSMENT — PAIN SCALES - GENERAL: PAINLEVEL_OUTOF10: 0-NO PAIN

## 2025-05-28 NOTE — PROGRESS NOTES
"  Division of Plastic and Reconstructive Surgery  Clinic Visit Note    Patient Name: Ashley Jiang  MRN: [id number]  Date:  05/28/25     [facility]    Ashley Jiang is a 59 y.o. 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 Barnett and left breast TAMARA reconstruction with Dr. Reyes on 2/21/25 with postoperative course notable for development of cellulitis adjacent to her TAMARA flap edge incision which required admission for IV ABX and return to OR on 5/21/25 for L breast excisional debridement, complex wound closure and application of an incisional wound vac per Dr. Reyes. She presents to clinic today for her first postoperative visit and wound vac removal.      Subjective    Endorses feeling well overall. No issues with incisional wound vac since hospital DC. Completed last dose of PO ABX today. Denies fever, chills, headache, dizziness, chest pain, palpitations, dyspnea, abdominal pain, nausea or vomiting. Continues to adhere to activity restrictions and use of abdominal compression garment.     Medical History[1]  Surgical History[2]  Allergies[3]  Current Medications[4]   Family History[5]  Social History[6]    ROS: All 10 systems were reviewed and are unremarkable except for those mentioned in HPI.      Objective   /78 (BP Location: Right arm, Patient Position: Sitting, BP Cuff Size: Adult)   Pulse 76   Resp 16   Ht 1.676 m (5' 6\")   Wt 67.1 kg (148 lb)   BMI 23.89 kg/m²      Physical Exam  Constitutional: A&Ox3, calm and cooperative, NAD.  Eyes: EOMI, clear sclera.  ENMT: Moist mucous membranes, no apparent injuries or lesions.  Head/Neck: NC/AT. Neck supple.   Cardiovascular: Normal rate and regular rhythm.   Respiratory/Thorax: Breathing comfortably with regular respirations on RA. Good " symmetric chest expansion.   Gastrointestinal: Abdomen soft, non-tender.   Genitourinary: Voiding independently.   Extremities: No peripheral edema. Moves all 4 extremities actively, strength appropriate.   Neurological: A&Ox3.   Psychological: Appropriate mood and behavior.   Skin: Warm and dry, no rashes.      Focused exam of the left breast and abdomen: Left breast s/p reconstruction via free flap which appears healed overall, good postoperative shape and symmetry. Site of L breast incisional excision appears well healed overall s/p incisional wound vac dressing removal, no erythema, fluctuance, warmth, edema, dehiscence or drainage.     Umbilical region well healed.     Assessment   Ashley Jiang is a 59 y.o. female who is s/p OR on 5/21/25 for L breast excisional debridement, complex wound closure and application of an incisional wound vac per Dr. Reyes. She presents to clinic today for her first postoperative visit and wound vac removal.      Plan    - Site well healed overall, 1 cm region along incision line with continued healing, no signs of infection or incisional dehiscence   - Start daily local wound care to site as follows: cleanse site daily with warm soapy water (no soaking or scrubbing until fully healed) then gently pat to dry and apply light layer of topical bacitracin covered by small strip of single layer xerform and mepilex for 5-7 days or until site is fully healed   - Maintain activity limitations at upper extremities for 1 additional week then OK to gradually resume and advance activities as tolerated, monitor incision with resume of activity   - OK to DC use of abdominal compression garment and utilize on PRN basis moving forward   - No further abx needs foreseen at this time, site is without signs of infection on exam today, OR cxs from 5/21 revealed and negative   - Next FUV with plastic surgery as scheduled on 6/23/25, encouraged to notify the office in the meantime with any further  questions/concerns     Patient and plan discussed with Dr. Reyes.     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    Tinnitus of both ears 10/24/2023    Urinary hesitancy 10/24/2023    She reports issues mostly around the time of her menses     [2]   Past Surgical History:  Procedure Laterality Date    BREAST BIOPSY Right 2/21/2025    Procedure: right Magseed excisional biopsy;  Surgeon: Charlette Barnett MD;  Location: [facility];  Service: General Surgery;  Laterality: Right;   [3]   Allergies  Allergen Reactions    Cleocin [Clindamycin Phosphate] Itching    Tegaderm [Adhesive] Itching and Rash   [4]   Current Outpatient Medications:     acetaminophen (TylenoL) 325 mg tablet, Take 2 tablets (650 mg) by mouth every 8 hours if needed for mild pain (1 - 3) for up to 14 days., Disp: 30 tablet, Rfl: 0    calcium carbonate (Os-Mac) 1,250 mg (500 mg elemental) tablet, Take 1 tablet by mouth 2 times daily (morning and late afternoon)., Disp: , Rfl:     cholecalciferol (Vitamin D3) 50 mcg (2,000 units) capsule, Take 125 mcg by mouth early in the morning.., Disp: , Rfl:     multivitamin tablet, Take 1 tablet by mouth once daily., Disp: , Rfl:     amoxicillin-clavulanate (Augmentin) 875-125 mg tablet, Take 1 tablet (875 mg) by mouth 2 times a day for 7 days. (Patient not taking: Reported on 5/28/2025), Disp: 14 tablet, Rfl: 0  [5] No family history on file.  [6]   Social History  Tobacco Use    Smoking status: Never     Passive exposure: Never    Smokeless tobacco: Never   Vaping Use    Vaping status: Never Used   Substance Use Topics    Alcohol use: Yes     Comment: socially    Drug use: Never

## 2025-05-30 ENCOUNTER — TELEPHONE (OUTPATIENT)
Dept: PLASTIC SURGERY | Facility: CLINIC | Age: 60
End: 2025-05-30
Payer: COMMERCIAL

## 2025-06-02 ENCOUNTER — APPOINTMENT (OUTPATIENT)
Dept: PLASTIC SURGERY | Facility: CLINIC | Age: 60
End: 2025-06-02
Payer: COMMERCIAL

## 2025-06-02 NOTE — TELEPHONE ENCOUNTER
Left message regarding upcoming appointment. Pt reached out to inquire if June 2 appointment was still necessary. Contact dr. Reyes who approved cancellation of June 2 appointment. He will see her at the end of the month on 6/23. Instructed to contact our office if she needs to be seen before this appointment.

## 2025-06-05 NOTE — PROGRESS NOTES
Ashley Jiang female   1965 59 y.o.   [id number]      Chief Complaint  Annual mammogram and exam, history of left breast DCIS    History Of Present Illness  Ashley Jiang is a very pleasant 59 y.o.  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 She had a right breast core biopsy on 10/30/2024 demonstrating right breast papillary lesion with at least ADH. 2024 she had a 2 site left MRI guided core biopsy showing DCIS. She was taking low dose Tamoxifen by [facility] because of the delays in surgical care. Her biopsies were performed at [facility] and reviewed at . 2025 Charlette Barnett performed right Magseed excisional biopsy, left mastectomy and sentinel lymph node biopsy (0/3) and left TAMARA reconstruction with Dr. Reyes. Final pathology revealed left breast 18mm DCIS, grade 3 with negative margins. Right breast, benign. She presents today for annual mammogram and exam. She denies any new masses or lumps. She is here with her , Kane. She did not restart Tamoxifen following surgery and would like to discuss today. She does report hot flashes and mood swings during the two week period she took it.     REPRODUCTIVE HISTORY: menarche age 15, , first birth age 28, OCP's x 5-10 years, perimenopausal, LMP 2024, heterogeneously dense tissue    FAMILY CANCER HISTORY:   Mother: Breast cancer, age 56  Father: Testicular cancer      Surgical History  She has a past surgical history that includes Breast biopsy (Right, 2025); Breast lumpectomy (2025); Mastectomy (2025); Lymph node biopsy (2025); Colonoscopy (); LASIK; and Endometrial ablation ().     Social History  She reports that she has never smoked. She has never been exposed to tobacco smoke. She has never used smokeless tobacco. She reports current alcohol use. She reports that she does not use drugs.    Family  History  Family History[1]     Allergies  Cleocin [clindamycin phosphate] and Tegaderm [adhesive]    Medications  Current Outpatient Medications   Medication Instructions    calcium carbonate (Os-Mac) 1,250 mg (500 mg elemental) tablet 1 tablet, 2 times daily (morning and late afternoon)    multivitamin tablet 1 tablet, Daily RT         REVIEW OF SYSTEMS    Constitutional:  Negative for appetite change, fatigue, fever and unexpected weight change.   HENT:  Negative for ear pain, hearing loss, nosebleeds, sore throat and trouble swallowing.    Eyes:  Negative for discharge, itching and visual disturbance.   Respiratory:  Negative for cough, chest tightness and shortness of breath.    Cardiovascular:  Negative for chest pain, palpitations and leg swelling.   Breast: as indicated in HPI  Gastrointestinal:  Negative for abdominal pain, constipation, diarrhea and nausea.   Endocrine: Negative for cold intolerance and heat intolerance.   Genitourinary:  Negative for dysuria, frequency, hematuria, pelvic pain and vaginal bleeding.   Musculoskeletal:  Negative for arthralgias, back pain, gait problem, joint swelling and myalgias.   Skin:  Negative for color change and rash.   Allergic/Immunologic: Negative for environmental allergies and food allergies.   Neurological:  Negative for dizziness, tremors, speech difficulty, weakness, numbness and headaches.   Hematological:  Does not bruise/bleed easily.   Psychiatric/Behavioral:  Negative for agitation, dysphoric mood and sleep disturbance. The patient is not nervous/anxious.         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     Physical Exam  Patient is alert and oriented x3 and in a  relaxed and appropriate mood. Her gait is steady and hand grasps are equal. Sclera is clear. The breasts are asymmetrical. The left breast and nipple have been removed with a well-healed TAMARA flap mastectomy incision with palpable and visible suture ties. The left breast is tender to touch. The overlying tissue is soft without palpable abnormalities, discrete nodules or masses. The right breast has a well-healed excisional biopsy incision, central slightly superior lateral quadrant. The right breast tissue is soft without palpable abnormalities, discrete nodules or masses. The skin and right nipple appear normal. There is no cervical, supraclavicular or axillary lymphadenopathy. Heart rate and rhythm normal, S1 and S2 appreciated. The lungs are clear to auscultation bilaterally.    Physical Exam  Chest:            Last Recorded Vitals  Vitals:    06/23/25 1111   BP: 116/80   Pulse: 59   Temp: 36.4 °C (97.6 °F)   SpO2: 98%       Relevant Results   Time was spent viewing digital images of the radiology testing with the patient. I explained the results in depth, along with suggested explanation for follow up recommendations based on the testing results. BI-RADS Category 2    Imaging  Narrative & Impression   Interpreted By:  Loraine Mansfield,   STUDY:  BI MAMMO RIGHT DIAGNOSTIC TOMOSYNTHESIS;  6/23/2025 11:02 am      ACCESSION NUMBER(S):  [id number]      ORDERING CLINICIAN:  PRITESH RAM      INDICATION:  Right breast excisional biopsy for atypia and left mastectomy, both  in February 2025.      ,N60.91 Unspecified benign mammary dysplasia of right breast      COMPARISON:  02/19/2025 and 10/30/2024.      FINDINGS:  MAMMOGRAPHY: 2D and tomosynthesis images were reviewed at 1 mm slice  thickness.      Density:  The breasts are heterogeneously dense, which may obscure  small masses.      Postsurgical scarring with associated clips is now present in the  superior lateral aspect at mid to posterior depth. No suspicious  masses  or calcifications are identified.      IMPRESSION:  No mammographic evidence of malignancy.      BI-RADS CATEGORY:  BI-RADS Category:  2 Benign.  Recommendation:  Annual Screening.  Recommended Date:  1 Year.  Laterality:  Bilateral.         Assessment/Plan   Normal clinical exam and imaging, history of left DCIS, history of right ADH, s/p left mastectomy with TAMARA flap reconstruction, family history of breast cancer, heterogeneously dense tissue    Plan: Return in one year for right screening mammogram and office visit. Thoroughly discussed restarting Tamoxifen. Discussed 5mg/10mg/20mg. She is in agreement to restart Tamoxifen 10mg every other day. Sent to requested pharmacy. Referred to menopause clinic to discuss perimenopausal symptoms.     Patient Discussion/Summary  Your clinical examination and imaging are normal. Please return in one year for right screening mammogram and office visit or sooner if you have any problems or concerns. Restart Tamoxifen 10mg every other day.     You can see your health information, review clinical summaries from office visits & test results online when you follow your health with MY  Chart, a personal health record. To sign up go to [email or web address]. If you need assistance with signing up or trouble getting into your account call [employer] Patient Line 24/7 at [phone].    My office phone number is [phone] if you need to get in touch with me or have additional questions or concerns. Thank you for choosing [facility] and trusting me as your healthcare provider. I look forward to seeing you again at your next office visit. I am honored to be a provider on your health care team and I remain dedicated to helping you achieve your health goals.      Tonya Locke, APRN-CNP         [1]   Family History  Problem Relation Name Age of Onset    Breast cancer Mother Malini Stein     Cancer Father Syed Stein     Heart disease Maternal Grandfather  Trenton Arias     Cancer Maternal Grandmother Linda Gottlieb     Cancer Paternal Grandmother Brandyn Stein     Heart disease Paternal Grandmother Brandyn Stein

## 2025-06-08 LAB
FUNGUS SPEC CULT: NORMAL
FUNGUS SPEC FUNGUS STN: NORMAL

## 2025-06-08 NOTE — PROGRESS NOTES
Subjective   Patient ID: Ashley Jiang is a 59 y.o. female presenting for post-operative visit.    HPI Ashley Jiang is a 59 y.o. 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 Barnett and left breast TAMARA reconstruction with Dr. Reyes on 2/21/25 with postoperative course notable for development of cellulitis adjacent to her TAMARA flap edge incision which required admission for IV ABX and return to OR on 5/21/25 for L breast excisional debridement, complex wound closure and application of an incisional wound vac.     Review of Systems  All 10 systems were reviewed and are unremarkable except for those mentioned in HPI.     Objective   Physical Exam  Physical Exam  Vitals and nursing note reviewed. Exam conducted with a chaperone present.     Constitutional:       General: She is not in acute distress.     Appearance: She is not ill-appearing.   Eyes:      Extraocular Movements: Extraocular movements intact.      Conjunctiva/sclera: Conjunctivae normal.      Pupils: Pupils are equal, round, and reactive to light.   Cardiovascular:      Rate and Rhythm: Normal rate and regular rhythm.      Pulses: Normal pulses.   Pulmonary:      Effort: Pulmonary effort is normal.      Breath sounds: Normal breath sounds.   Abdominal:      Palpations: Abdomen is soft. There is no mass.      Tenderness: There is no abdominal tenderness.      Hernia: No hernia is present.   Musculoskeletal:         General: No swelling or tenderness.      Cervical back: Normal range of motion and neck supple.   Skin:     Capillary Refill: Capillary refill takes less than 2 seconds.      Coloration: Skin is not jaundiced.      Findings: No bruising or rash.   Neurological:      General: No focal deficit present.      Mental Status: She is  oriented to person, place, and time.   Psychiatric:         Mood and Affect: Mood normal.         Behavior: Behavior normal.         Thought Content: Thought content normal.         Judgment: Judgment normal.     Focused exam of the left breast and abdomen: Left breast s/p reconstruction via free flap which appears well healed with good postoperative shape and symmetry. No erythema, fluctuance, warmth, edema, dehiscence and drainage were noted.     Umbilical region well healed.     Left breast is slightly rm laterally compared to right breast.     Grade II ptosis at right breast.   Assessment/Plan   I had the pleasure of seeing Ashley and her  in clinic today.  She presents for a follow-up status post superficial wound dehiscence and delayed closure.  She is also interested in assessing the need for future revisions.    On assessment today, the reconstructed breast looks near normal.  There is slight asymmetry between the native breast and the reconstructed breast.  There certainly is room for improvement in particular reducing the left breast flap into 4.2 mm areola [place] with base narrowing in the form of a vertical left, and periareolar/vertical  mastopexy on the right.  The patient stated however that with the bra worn, there is no difference between both breast, and she feels less motivated to do anything as the difference is insignificant.    We discussed that such revision surgery can be done later at least 6 months from the index surgery.  Patient expressed good understanding and would like to wait.     Recommendations:    Okay to use the swimming pool and the bathtub. Always check the temperature of the water.  Postop photos with Vectra  Please continue to use silicone scar tape for at least 12 hours a day on your scars  May apply Mederma scar cream during the day on your scars.

## 2025-06-23 ENCOUNTER — APPOINTMENT (OUTPATIENT)
Dept: PLASTIC SURGERY | Facility: CLINIC | Age: 60
End: 2025-06-23
Payer: COMMERCIAL

## 2025-06-23 ENCOUNTER — OFFICE VISIT (OUTPATIENT)
Dept: SURGICAL ONCOLOGY | Facility: CLINIC | Age: 60
End: 2025-06-23
Payer: COMMERCIAL

## 2025-06-23 ENCOUNTER — HOSPITAL ENCOUNTER (OUTPATIENT)
Dept: RADIOLOGY | Facility: CLINIC | Age: 60
Discharge: HOME | End: 2025-06-23
Payer: COMMERCIAL

## 2025-06-23 VITALS
HEART RATE: 59 BPM | SYSTOLIC BLOOD PRESSURE: 116 MMHG | WEIGHT: 147.4 LBS | DIASTOLIC BLOOD PRESSURE: 80 MMHG | BODY MASS INDEX: 23.8 KG/M2

## 2025-06-23 VITALS — HEIGHT: 66 IN | WEIGHT: 147.93 LBS | BODY MASS INDEX: 23.77 KG/M2

## 2025-06-23 VITALS
TEMPERATURE: 97.6 F | DIASTOLIC BLOOD PRESSURE: 80 MMHG | SYSTOLIC BLOOD PRESSURE: 116 MMHG | OXYGEN SATURATION: 98 % | HEART RATE: 59 BPM

## 2025-06-23 DIAGNOSIS — N65.1 BREAST ASYMMETRY BETWEEN NATIVE BREAST AND RECONSTRUCTED BREAST: ICD-10-CM

## 2025-06-23 DIAGNOSIS — Z08 ENCOUNTER FOR FOLLOW-UP SURVEILLANCE OF BREAST CANCER: Primary | ICD-10-CM

## 2025-06-23 DIAGNOSIS — Z98.890 POST-OPERATIVE STATE: Primary | ICD-10-CM

## 2025-06-23 DIAGNOSIS — N60.91 ATYPICAL DUCTAL HYPERPLASIA OF RIGHT BREAST: ICD-10-CM

## 2025-06-23 DIAGNOSIS — Z85.3 ENCOUNTER FOR FOLLOW-UP SURVEILLANCE OF BREAST CANCER: Primary | ICD-10-CM

## 2025-06-23 DIAGNOSIS — Z79.810 USE OF TAMOXIFEN (NOLVADEX): ICD-10-CM

## 2025-06-23 DIAGNOSIS — N95.1 MENOPAUSAL SYMPTOMS: ICD-10-CM

## 2025-06-23 PROCEDURE — 77065 DX MAMMO INCL CAD UNI: CPT | Mod: RIGHT SIDE | Performed by: RADIOLOGY

## 2025-06-23 PROCEDURE — 1036F TOBACCO NON-USER: CPT | Performed by: NURSE PRACTITIONER

## 2025-06-23 PROCEDURE — 99213 OFFICE O/P EST LOW 20 MIN: CPT | Performed by: NURSE PRACTITIONER

## 2025-06-23 PROCEDURE — 77061 BREAST TOMOSYNTHESIS UNI: CPT | Mod: RT

## 2025-06-23 PROCEDURE — 1036F TOBACCO NON-USER: CPT

## 2025-06-23 PROCEDURE — 99024 POSTOP FOLLOW-UP VISIT: CPT

## 2025-06-23 PROCEDURE — 77061 BREAST TOMOSYNTHESIS UNI: CPT | Mod: RIGHT SIDE | Performed by: RADIOLOGY

## 2025-06-23 RX ORDER — TAMOXIFEN CITRATE 10 MG/1
10 TABLET ORAL EVERY OTHER DAY
Qty: 45 TABLET | Refills: 3 | Status: SHIPPED | OUTPATIENT
Start: 2025-06-23 | End: 2026-06-18

## 2025-06-23 ASSESSMENT — PAIN SCALES - GENERAL
PAINLEVEL_OUTOF10: 2
PAINLEVEL_OUTOF10: 0-NO PAIN

## 2025-06-23 NOTE — PATIENT INSTRUCTIONS
Your clinical examination and imaging are normal. Please return in one year for right screening mammogram and office visit or sooner if you have any problems or concerns. Restart Tamoxifen 10mg every other day.     You can see your health information, review clinical summaries from office visits & test results online when you follow your health with MY  Chart, a personal health record. To sign up go to [email or web address]. If you need assistance with signing up or trouble getting into your account call [employer] Patient Line 24/7 at [phone].    My office phone number is [phone] if you need to get in touch with me or have additional questions or concerns. Thank you for choosing [facility] and trusting me as your healthcare provider. I look forward to seeing you again at your next office visit. I am honored to be a provider on your health care team and I remain dedicated to helping you achieve your health goals.

## 2025-07-01 ENCOUNTER — APPOINTMENT (OUTPATIENT)
Dept: PLASTIC SURGERY | Facility: CLINIC | Age: 60
End: 2025-07-01
Payer: COMMERCIAL

## 2025-07-01 NOTE — PROGRESS NOTES
Patient came in for post-op TAMARA photos and Vectra assessment.    Breast volumes according to Vectra are as follows:    R - 263.6cc  L - 271.6cc

## 2025-07-02 DIAGNOSIS — L03.818 CELLULITIS OF OTHER SPECIFIED SITE: Primary | ICD-10-CM

## 2025-07-02 RX ORDER — SULFAMETHOXAZOLE AND TRIMETHOPRIM 800; 160 MG/1; MG/1
1 TABLET ORAL 2 TIMES DAILY
Qty: 14 TABLET | Refills: 0 | Status: SHIPPED | OUTPATIENT
Start: 2025-07-02 | End: 2025-07-09

## 2025-07-09 DIAGNOSIS — L03.818 CELLULITIS OF OTHER SPECIFIED SITE: ICD-10-CM

## 2025-07-09 RX ORDER — SULFAMETHOXAZOLE AND TRIMETHOPRIM 800; 160 MG/1; MG/1
1 TABLET ORAL 2 TIMES DAILY
Qty: 14 TABLET | Refills: 0 | Status: SHIPPED | OUTPATIENT
Start: 2025-07-09 | End: 2025-07-16

## 2025-07-09 RX ORDER — MUPIROCIN 20 MG/G
OINTMENT TOPICAL
Qty: 15 G | Refills: 0 | Status: SHIPPED | OUTPATIENT
Start: 2025-07-09 | End: 2025-07-19

## 2025-08-08 NOTE — PROGRESS NOTES
Subjective   Patient ID: Ashley Jiang is a 59 y.o. female presenting for post-operative follow up visit.    HPI Ashley Jiang is a 59 y.o. 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 Barnett and left breast TAMARA reconstruction with Dr. Reyes on 2/21/25 with postoperative course notable for development of cellulitis adjacent to her TAMARA flap edge incision which required admission for IV ABX and return to OR on 5/21/25 for L breast excisional debridement, complex wound closure and application of an incisional wound vac.     Review of Systems  All 10 systems were reviewed and are unremarkable except for those mentioned in HPI.     Objective   Physical Exam  Physical Exam  Vitals and nursing note reviewed. Exam conducted with a chaperone present.     Constitutional:       General: She is not in acute distress.     Appearance: She is not ill-appearing.   Eyes:      Extraocular Movements: Extraocular movements intact.      Conjunctiva/sclera: Conjunctivae normal.      Pupils: Pupils are equal, round, and reactive to light.   Cardiovascular:      Rate and Rhythm: Normal rate and regular rhythm.      Pulses: Normal pulses.   Pulmonary:      Effort: Pulmonary effort is normal.      Breath sounds: Normal breath sounds.   Abdominal:      Palpations: Abdomen is soft. There is no mass.      Tenderness: There is no abdominal tenderness.      Hernia: No hernia is present.   Musculoskeletal:         General: No swelling or tenderness.      Cervical back: Normal range of motion and neck supple.   Skin:     Capillary Refill: Capillary refill takes less than 2 seconds.      Coloration: Skin is not jaundiced.      Findings: No bruising or rash.   Neurological:      General: No focal deficit present.      Mental  Status: She is oriented to person, place, and time.   Psychiatric:         Mood and Affect: Mood normal.         Behavior: Behavior normal.         Thought Content: Thought content normal.         Judgment: Judgment normal.     Focused exam of the left breast and abdomen: Left breast s/p reconstruction via free flap which appears well healed with good postoperative shape and symmetry. No erythema, fluctuance, warmth, edema, dehiscence and drainage were noted.     Umbilical region well healed.     Left breast is slightly rm laterally compared to right breast.     Grade II ptosis at right breast.   Assessment/Plan   I had the pleasure of seeing Ashley in clinic today.  She presents for a follow-up status post superficial wound dehiscence and delayed closure.      Recommendations:

## 2025-08-11 ENCOUNTER — APPOINTMENT (OUTPATIENT)
Dept: PLASTIC SURGERY | Facility: CLINIC | Age: 60
End: 2025-08-11
Payer: COMMERCIAL

## 2025-08-11 VITALS
HEIGHT: 66 IN | WEIGHT: 144 LBS | RESPIRATION RATE: 16 BRPM | DIASTOLIC BLOOD PRESSURE: 76 MMHG | SYSTOLIC BLOOD PRESSURE: 119 MMHG | HEART RATE: 71 BPM | BODY MASS INDEX: 23.14 KG/M2

## 2025-08-11 DIAGNOSIS — L53.9 ERYTHEMA: Primary | ICD-10-CM

## 2025-08-11 PROCEDURE — 99212 OFFICE O/P EST SF 10 MIN: CPT

## 2025-08-11 PROCEDURE — 3008F BODY MASS INDEX DOCD: CPT

## 2025-08-11 RX ORDER — ACETAMINOPHEN 500 MG
50 TABLET ORAL DAILY
COMMUNITY

## 2025-08-11 ASSESSMENT — PAIN SCALES - GENERAL: PAINLEVEL_OUTOF10: 2

## 2025-08-12 PROBLEM — L53.9 ERYTHEMA: Status: ACTIVE | Noted: 2025-08-12

## 2025-12-23 ENCOUNTER — APPOINTMENT (OUTPATIENT)
Dept: OBSTETRICS AND GYNECOLOGY | Facility: CLINIC | Age: 60
End: 2025-12-23
Payer: COMMERCIAL

## (undated) DEVICE — Device

## (undated) DEVICE — MANIFOLD, 4 PORT NEPTUNE STANDARD

## (undated) DEVICE — PROBE COVER, INTRAOPERATIVE, 13 X 244CM (5 X 96IN)

## (undated) DEVICE — PAD, GROUNDING, ELECTROSURGICAL, W/9 FT CABLE, POLYHESIVE II, ADULT, LF

## (undated) DEVICE — ELECTRODE, ELECTROSURGICAL, BLADE EXT 4 INCH, INSULATED

## (undated) DEVICE — THERAPY UNIT, 14-DAY PREVENA PLUS 125

## (undated) DEVICE — CLIP, LIGATING, W/ADHESIVE, WIDE SLOT, SMALL, TITANIUM

## (undated) DEVICE — COVER, TABLE, 44 X 75 IN, DISPOSABLE, LF, STERILE

## (undated) DEVICE — STAY SET, SURGICAL, 5MM SHARP HOOK, 8PK

## (undated) DEVICE — ELECTRODE, ELECTROSURGICAL, BLADE, INSULATED, ENT/IMA, STERILE

## (undated) DEVICE — WOUND SYSTEM, DEBRIDEMENT & CLEANING, O.R DUOPAK

## (undated) DEVICE — TUBING, SMOKE EVAC, 3/8 X 10 FT

## (undated) DEVICE — COVER PROBE, SOFT FLEX W/ GEL, 5 X 48 IN (13X122CM)

## (undated) DEVICE — COVER, CART, 45 X 27 X 48 IN, CLEAR

## (undated) DEVICE — RETRACTOR, CORDLESS, RADIALUX, LIGHTED

## (undated) DEVICE — MARKER, SKIN, DUAL TIP, W/RULER AND LABEL

## (undated) DEVICE — CLIP, LIGATING, HORIZON, MEDIUM, TITANIUM

## (undated) DEVICE — GLOVE, SURGICAL, PROTEXIS PI BLUE W/NEUTHERA, 6.5, PF, LF

## (undated) DEVICE — STAPLER, SKIN PROXIMATE, 35 WIDE

## (undated) DEVICE — NEEDLE, MICRODISSECTION STR 4CM

## (undated) DEVICE — ADHESIVE, SKIN, DERMABOND ADVANCED, 15CM, PEN-STYLE

## (undated) DEVICE — PREP TRAY, SKIN, DRY, W/GLOVES

## (undated) DEVICE — GLOVE, SURGICAL, PROTEXIS PI MICRO, 6.0, PF, LF

## (undated) DEVICE — SUTURE, VICRYL PLUS 3-0, SH, 27IN

## (undated) DEVICE — TUBING, INFILTRATION, 13FT, STERILE

## (undated) DEVICE — SUTURE, SILK, 2-0, 30 IN, SH, BLACK

## (undated) DEVICE — DRESSING, NON-ADHERENT, TELFA, OUCHLESS, 3 X 8 IN, STERILE

## (undated) DEVICE — SUTURE, MONOCRYL, 4-0, 27 IN, PS-2, UNDYED

## (undated) DEVICE — TOWEL, SURGICAL, NEURO, O/R, 16 X 26, BLUE, STERILE

## (undated) DEVICE — THERAPY UNIT, PREVENA PLUS 125

## (undated) DEVICE — DRESSING, ABDOMINAL, TENDERSORB, 8 X 10 IN, STERILE

## (undated) DEVICE — DISSECTOR, EXACT, LIGASURE

## (undated) DEVICE — MICROCLIPS, GEM HEMOSTATIC TITANIUM

## (undated) DEVICE — CATHETER, IV, INSYTE, AUTOGUARD, SHIELDED, 24 G X 0.75 IN, VIALON

## (undated) DEVICE — CLIP, LIGATING, HORIZON, WIDE SLOT, SMALL, TITANIUM

## (undated) DEVICE — CATHETER TRAY, SURESTEP, 16FR, URINE METER W/STATLOCK

## (undated) DEVICE — APPLIER, LIGACLIP, MULTIPLE, SMALL 9-3/8IN

## (undated) DEVICE — SUTURE, VICRYL, 3-0, 27 IN, SH, VIOLET

## (undated) DEVICE — DRESSING, PREVENA, PEEL AND PLACE, 13CM

## (undated) DEVICE — DRAPE, SHEET, THREE QUARTER, FAN FOLD, 57 X 77 IN

## (undated) DEVICE — APPLIER,  LIGACLIP MULTI CLIP, 30 MED 11 1/2

## (undated) DEVICE — BANDAGE, ELASTIC, PREMIUM, SELF-CLOSE, 6 IN X 5.5 YD, STERILE

## (undated) DEVICE — BAG, DECANTER

## (undated) DEVICE — SUTURE, MONOCRYL, 4-0, 18 IN, PS2, UNDYED

## (undated) DEVICE — DRAPE PACK, UNIVERSAL II

## (undated) DEVICE — CLEANER, WIPE, INSTRUMENT, 3.25 X 3.25 IN